# Patient Record
Sex: FEMALE | Race: WHITE | NOT HISPANIC OR LATINO | Employment: OTHER | URBAN - METROPOLITAN AREA
[De-identification: names, ages, dates, MRNs, and addresses within clinical notes are randomized per-mention and may not be internally consistent; named-entity substitution may affect disease eponyms.]

---

## 2023-11-22 DIAGNOSIS — G40.909 SEIZURE DISORDER (MULTI): Primary | ICD-10-CM

## 2023-11-22 RX ORDER — ZONISAMIDE 100 MG/1
300 CAPSULE ORAL DAILY
Qty: 90 CAPSULE | Refills: 0 | Status: SHIPPED | OUTPATIENT
Start: 2023-11-22 | End: 2024-03-21 | Stop reason: SDUPTHER

## 2023-12-27 ENCOUNTER — HOSPITAL ENCOUNTER (EMERGENCY)
Facility: HOSPITAL | Age: 73
Discharge: HOME | End: 2023-12-28
Attending: EMERGENCY MEDICINE
Payer: MEDICARE
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
## 2023-12-27 DIAGNOSIS — S09.90XA HEAD INJURY, INITIAL ENCOUNTER: ICD-10-CM

## 2023-12-27 PROCEDURE — 99285 EMERGENCY DEPT VISIT HI MDM: CPT | Performed by: EMERGENCY MEDICINE

## 2023-12-28 ENCOUNTER — APPOINTMENT (OUTPATIENT)
Dept: RADIOLOGY | Facility: HOSPITAL | Age: 73
End: 2023-12-28
Payer: MEDICARE

## 2023-12-28 VITALS
DIASTOLIC BLOOD PRESSURE: 80 MMHG | TEMPERATURE: 97 F | OXYGEN SATURATION: 99 % | RESPIRATION RATE: 20 BRPM | SYSTOLIC BLOOD PRESSURE: 105 MMHG | HEART RATE: 80 BPM

## 2023-12-28 PROCEDURE — 90714 TD VACC NO PRESV 7 YRS+ IM: CPT | Performed by: EMERGENCY MEDICINE

## 2023-12-28 PROCEDURE — 2500000004 HC RX 250 GENERAL PHARMACY W/ HCPCS (ALT 636 FOR OP/ED): Performed by: EMERGENCY MEDICINE

## 2023-12-28 PROCEDURE — 70450 CT HEAD/BRAIN W/O DYE: CPT | Performed by: RADIOLOGY

## 2023-12-28 PROCEDURE — 72125 CT NECK SPINE W/O DYE: CPT

## 2023-12-28 PROCEDURE — 90471 IMMUNIZATION ADMIN: CPT | Performed by: EMERGENCY MEDICINE

## 2023-12-28 PROCEDURE — 70450 CT HEAD/BRAIN W/O DYE: CPT

## 2023-12-28 PROCEDURE — 72125 CT NECK SPINE W/O DYE: CPT | Performed by: RADIOLOGY

## 2023-12-28 RX ADMIN — CLOSTRIDIUM TETANI TOXOID ANTIGEN (FORMALDEHYDE INACTIVATED) AND CORYNEBACTERIUM DIPHTHERIAE TOXOID ANTIGEN (FORMALDEHYDE INACTIVATED) 0.5 ML: 5; 2 INJECTION, SUSPENSION INTRAMUSCULAR at 02:24

## 2023-12-28 ASSESSMENT — COLUMBIA-SUICIDE SEVERITY RATING SCALE - C-SSRS
1. IN THE PAST MONTH, HAVE YOU WISHED YOU WERE DEAD OR WISHED YOU COULD GO TO SLEEP AND NOT WAKE UP?: NO
2. HAVE YOU ACTUALLY HAD ANY THOUGHTS OF KILLING YOURSELF?: NO
6. HAVE YOU EVER DONE ANYTHING, STARTED TO DO ANYTHING, OR PREPARED TO DO ANYTHING TO END YOUR LIFE?: NO

## 2023-12-28 ASSESSMENT — PAIN SCALES - GENERAL: PAINLEVEL_OUTOF10: 0 - NO PAIN

## 2023-12-28 ASSESSMENT — PAIN - FUNCTIONAL ASSESSMENT: PAIN_FUNCTIONAL_ASSESSMENT: 0-10

## 2023-12-28 NOTE — ED PROVIDER NOTES
HPI   Chief Complaint   Patient presents with    Fall     Witnessed fall by . Hx of brain lesion and alcoholism. Pt a+o to self at this time        This is a 73 years old female patient with history of dementia and EtOH use presented to the emergency department after she fell.   stated that she drank a lot tonight, she is intoxicated, she lost her balance, fell, hit her head.  Patient has a very small injury to the dorsal aspect of the right foot at the level of the 4th-5th metatarsophalangeal joint.  Could not recall her last tetanus shot.  Denies any headache, lightheadedness, dizziness, chest pain, shortness of breath, cough, abdominal pain, weakness or numbness.  Able to move all extremities.      History of present illness as well as review of system are limited secondary to the patient's dementia and intoxication.                          Melissa Coma Scale Score: 14                  Patient History   No past medical history on file.  Past Surgical History:   Procedure Laterality Date    CT ANGIO NECK W AND WO IV CONTRAST  4/24/2022    CT NECK ANGIO W AND WO IV CONTRAST 4/24/2022 [facility]    CT HEAD ANGIO W AND WO IV CONTRAST  4/24/2022    CT HEAD ANGIO W AND WO IV CONTRAST 4/24/2022 [facility]     No family history on file.  Social History     Tobacco Use    Smoking status: Not on file    Smokeless tobacco: Not on file   Substance Use Topics    Alcohol use: Not on file    Drug use: Not on file       Physical Exam   ED Triage Vitals [12/28/23 0004]   Temp Heart Rate Resp BP   -- 88 20 (!) 172/92      SpO2 Temp src Heart Rate Source Patient Position   99 % -- -- --      BP Location FiO2 (%)     Right arm --       Physical Exam  Vitals and nursing note reviewed.   Constitutional:       General: She is not in acute distress.     Appearance: She is well-developed.   HENT:      Head: Normocephalic.      Comments: Hematoma to the left parieto-occipital region  Eyes:       Conjunctiva/sclera: Conjunctivae normal.   Cardiovascular:      Rate and Rhythm: Normal rate and regular rhythm.      Heart sounds: No murmur heard.  Pulmonary:      Effort: Pulmonary effort is normal. No respiratory distress.      Breath sounds: Normal breath sounds.   Abdominal:      Palpations: Abdomen is soft.      Tenderness: There is no abdominal tenderness.   Musculoskeletal:         General: No swelling.      Cervical back: Neck supple.   Skin:     General: Skin is warm and dry.      Capillary Refill: Capillary refill takes less than 2 seconds.   Neurological:      General: No focal deficit present.      Mental Status: She is alert. Mental status is at baseline.   Psychiatric:         Mood and Affect: Mood normal.         ED Course & MDM   Diagnoses as of 12/28/23 0200   Fall, initial encounter   Head injury, initial encounter       Medical Decision Making  Patient is seen and examined, will obtain CT head, cervical spine given the mechanical nature of the fall.   is okay to get the patient a ride home.    Scans are unremarkable,  is very comfortable driving with the patient back home.Patient is given instruction to return to the ED if alarming symptoms arise as per discharge instruction.    Amount and/or Complexity of Data Reviewed  Radiology: ordered. Decision-making details documented in ED Course.        Procedure  Procedures     Nitin Shepherd,   12/28/23 0200

## 2023-12-28 NOTE — DISCHARGE INSTRUCTIONS
Please follow-up with a primary care provider in 2 to 3 days.  Avoid binge drinking.  Return immediately to the emergency department if you develop any headache, change in vision, weakness, tingling, or numbness to the hands or feet.

## 2024-02-12 ENCOUNTER — HOSPITAL ENCOUNTER (OUTPATIENT)
Dept: RADIOLOGY | Facility: CLINIC | Age: 74
Discharge: HOME | End: 2024-02-12
Payer: MEDICARE

## 2024-02-12 DIAGNOSIS — G93.9 DISORDER OF BRAIN, UNSPECIFIED: ICD-10-CM

## 2024-02-12 DIAGNOSIS — G40.909 EPILEPSY, UNSPECIFIED, NOT INTRACTABLE, WITHOUT STATUS EPILEPTICUS (MULTI): ICD-10-CM

## 2024-02-12 PROCEDURE — 2500000004 HC RX 250 GENERAL PHARMACY W/ HCPCS (ALT 636 FOR OP/ED): Performed by: STUDENT IN AN ORGANIZED HEALTH CARE EDUCATION/TRAINING PROGRAM

## 2024-02-12 PROCEDURE — A9575 INJ GADOTERATE MEGLUMI 0.1ML: HCPCS | Performed by: STUDENT IN AN ORGANIZED HEALTH CARE EDUCATION/TRAINING PROGRAM

## 2024-02-12 PROCEDURE — 70553 MRI BRAIN STEM W/O & W/DYE: CPT | Performed by: RADIOLOGY

## 2024-02-12 PROCEDURE — 70553 MRI BRAIN STEM W/O & W/DYE: CPT

## 2024-02-12 RX ORDER — GADOTERATE MEGLUMINE 376.9 MG/ML
11 INJECTION INTRAVENOUS
Status: COMPLETED | OUTPATIENT
Start: 2024-02-12 | End: 2024-02-12

## 2024-02-12 RX ADMIN — GADOTERATE MEGLUMINE 11 ML: 376.9 INJECTION INTRAVENOUS at 10:27

## 2024-02-14 NOTE — RESULT ENCOUNTER NOTE
Katya's MRI was stable. There was no change to the right temporal lesion which looks like it is now a scar and not anything more serious. At this point, I do not recommend any further imaging but I will continue to follow her for the history of seizures. She has an appointment in march so I can discuss further then. Can you let her know?

## 2024-03-21 ENCOUNTER — OFFICE VISIT (OUTPATIENT)
Dept: NEUROLOGY | Facility: CLINIC | Age: 74
End: 2024-03-21
Payer: MEDICARE

## 2024-03-21 VITALS
DIASTOLIC BLOOD PRESSURE: 74 MMHG | HEIGHT: 65 IN | TEMPERATURE: 97.2 F | WEIGHT: 121 LBS | BODY MASS INDEX: 20.16 KG/M2 | RESPIRATION RATE: 18 BRPM | SYSTOLIC BLOOD PRESSURE: 142 MMHG | HEART RATE: 79 BPM

## 2024-03-21 DIAGNOSIS — G93.9 BRAIN LESION: Primary | ICD-10-CM

## 2024-03-21 DIAGNOSIS — G40.909 SEIZURE DISORDER (MULTI): ICD-10-CM

## 2024-03-21 PROBLEM — M19.91 LOCALIZED, PRIMARY OSTEOARTHRITIS: Status: ACTIVE | Noted: 2021-07-01

## 2024-03-21 PROBLEM — F32.A DEPRESSION: Status: ACTIVE | Noted: 2024-03-21

## 2024-03-21 PROBLEM — G43.909 MIGRAINE HEADACHE: Status: ACTIVE | Noted: 2024-03-21

## 2024-03-21 PROBLEM — F10.10 ALCOHOL ABUSE: Status: ACTIVE | Noted: 2024-03-21

## 2024-03-21 PROBLEM — E03.9 HYPOTHYROIDISM: Status: ACTIVE | Noted: 2024-03-21

## 2024-03-21 PROBLEM — I21.19: Status: ACTIVE | Noted: 2024-03-21

## 2024-03-21 PROBLEM — I10 HTN (HYPERTENSION): Status: ACTIVE | Noted: 2024-03-21

## 2024-03-21 PROBLEM — R47.01 APHASIA: Status: ACTIVE | Noted: 2022-09-06

## 2024-03-21 PROCEDURE — 3078F DIAST BP <80 MM HG: CPT | Performed by: STUDENT IN AN ORGANIZED HEALTH CARE EDUCATION/TRAINING PROGRAM

## 2024-03-21 PROCEDURE — 1160F RVW MEDS BY RX/DR IN RCRD: CPT | Performed by: STUDENT IN AN ORGANIZED HEALTH CARE EDUCATION/TRAINING PROGRAM

## 2024-03-21 PROCEDURE — 99214 OFFICE O/P EST MOD 30 MIN: CPT | Performed by: STUDENT IN AN ORGANIZED HEALTH CARE EDUCATION/TRAINING PROGRAM

## 2024-03-21 PROCEDURE — 3077F SYST BP >= 140 MM HG: CPT | Performed by: STUDENT IN AN ORGANIZED HEALTH CARE EDUCATION/TRAINING PROGRAM

## 2024-03-21 PROCEDURE — 1159F MED LIST DOCD IN RCRD: CPT | Performed by: STUDENT IN AN ORGANIZED HEALTH CARE EDUCATION/TRAINING PROGRAM

## 2024-03-21 PROCEDURE — 1126F AMNT PAIN NOTED NONE PRSNT: CPT | Performed by: STUDENT IN AN ORGANIZED HEALTH CARE EDUCATION/TRAINING PROGRAM

## 2024-03-21 RX ORDER — SUMATRIPTAN SUCCINATE 25 MG/1
25 TABLET ORAL ONCE AS NEEDED
COMMUNITY
End: 2024-05-01 | Stop reason: HOSPADM

## 2024-03-21 RX ORDER — ZONISAMIDE 100 MG/1
200 CAPSULE ORAL DAILY
Qty: 180 CAPSULE | Refills: 3 | Status: SHIPPED | OUTPATIENT
Start: 2024-03-21 | End: 2025-03-21

## 2024-03-21 RX ORDER — MELOXICAM 7.5 MG/1
7.5 TABLET ORAL DAILY PRN
COMMUNITY
End: 2024-05-01 | Stop reason: HOSPADM

## 2024-03-21 RX ORDER — VENLAFAXINE HYDROCHLORIDE 75 MG/1
75 CAPSULE, EXTENDED RELEASE ORAL DAILY
COMMUNITY
End: 2024-05-01 | Stop reason: HOSPADM

## 2024-03-21 RX ORDER — NAPROXEN SODIUM 220 MG/1
81 TABLET, FILM COATED ORAL DAILY
COMMUNITY
Start: 2022-05-11 | End: 2024-04-23 | Stop reason: ENTERED-IN-ERROR

## 2024-03-21 RX ORDER — SPIRONOLACTONE 50 MG/1
50 TABLET, FILM COATED ORAL DAILY
COMMUNITY
End: 2024-05-01 | Stop reason: HOSPADM

## 2024-03-21 RX ORDER — LEVOTHYROXINE SODIUM 50 UG/1
50 TABLET ORAL DAILY
COMMUNITY

## 2024-03-21 ASSESSMENT — ENCOUNTER SYMPTOMS
LOSS OF SENSATION IN FEET: 0
DEPRESSION: 0
OCCASIONAL FEELINGS OF UNSTEADINESS: 0

## 2024-03-21 ASSESSMENT — PAIN SCALES - GENERAL: PAINLEVEL: 0-NO PAIN

## 2024-03-21 NOTE — PROGRESS NOTES
Subjective     Katya Sandhu is a 73 y.o. 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    She was last seen 9/13/2023. Since then, she has had no further seizures. She is on zonisamide 300mg daily. Her headaches have resolved, off of venlafaxine. She continues to drink alcohol daily which per her  is a considerable amount. She has episodes of memory loss, slurred speech and  feels she might be over medicated. She is not driving.     Her  has noticed some cognitive impairment most obvious in her speech. She will start to speak but loses train of thought in the middle of the sentence. She has trouble with nouns.     Repeat MRI brain done 2/12/2024 showed stable left hippocampus and left amygdala non enhancing [id number] lesions consistent with mesial temporal sclerosis.     Patient Active Problem List   Diagnosis    Alcohol abuse    Aphasia    Depression    HTN (hypertension)    Hypothyroidism    Localized, primary osteoarthritis    Migraine headache    Myocardial infarction, inferolateral wall (CMS/HCC)    Brain lesion    Seizure disorder (CMS/HCC)       Objective   Neurological Exam  Mental Status  Awake, alert and oriented to person, place and time. Speech is normal.  Irritable at times  Midl cognitive impairment d.    Cranial Nerves  CN II: Visual fields full to confrontation.  CN III, IV, VI: Extraocular movements intact bilaterally.  CN V: Facial sensation is normal.  CN VII: Full and symmetric facial movement.  CN VIII: Hearing is normal.  CN IX, X: Palate elevates symmetrically  CN XI: Shoulder shrug strength is normal.  CN XII: Tongue midline without atrophy or fasciculations.    Motor   Strength is 5/5 throughout all four extremities.    Sensory  Light touch is normal in upper and lower extremities.     Coordination  Right: Finger-to-nose normal.Left: Finger-to-nose normal.    Physical Exam  Eyes:      Extraocular Movements: Extraocular  movements intact.   Neurological:      Motor: Motor strength is normal.  Psychiatric:         Speech: Speech normal.       Assessment/Plan   Diagnoses and all orders for this visit:  Brain lesion  Seizure disorder (CMS/HCC)    1. 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 Off Keppra due to behavioral side effects. Will reduce zonisamide 200mg at bedtime due to concern for side effects. Discussed that I would recommend against tapering off completely as she has a persistent scar and temporal lobe atrophy making her at risk for seizures in the future.      Brain lesion of unclear etiology. autoimmune encephalopathy panel negative. serial MRIs appear to be most consistent with gliosis from encephalitis and less likely a neoplastic lesion. Will only imaging for clinical symptoms going forward      2. Alcohol use disorder: not ok to drive while continuing to abuse alcohol in binges. recommend cessation of alcohol, again discussed referral to substance use disorder treatment, patient continued declined at this time     3. Migraine headaches: resolved. continue prn sumatriptan      Follow-up in 1 year

## 2024-04-23 ENCOUNTER — APPOINTMENT (OUTPATIENT)
Dept: RADIOLOGY | Facility: HOSPITAL | Age: 74
DRG: 028 | End: 2024-04-23
Payer: MEDICARE

## 2024-04-23 ENCOUNTER — HOSPITAL ENCOUNTER (INPATIENT)
Facility: HOSPITAL | Age: 74
LOS: 8 days | Discharge: NURSING, REHAB OR LONG-TERM CARE | DRG: 028 | End: 2024-05-01
Attending: STUDENT IN AN ORGANIZED HEALTH CARE EDUCATION/TRAINING PROGRAM | Admitting: INTERNAL MEDICINE
Payer: MEDICARE

## 2024-04-23 ENCOUNTER — APPOINTMENT (OUTPATIENT)
Dept: CARDIOLOGY | Facility: HOSPITAL | Age: 74
DRG: 028 | End: 2024-04-23
Payer: MEDICARE

## 2024-04-23 DIAGNOSIS — M54.2 NECK PAIN: ICD-10-CM

## 2024-04-23 DIAGNOSIS — F10.10 ETOH ABUSE: ICD-10-CM

## 2024-04-23 DIAGNOSIS — R13.12 OROPHARYNGEAL DYSPHAGIA: ICD-10-CM

## 2024-04-23 DIAGNOSIS — M62.82 RHABDOMYOLYSIS: Primary | ICD-10-CM

## 2024-04-23 DIAGNOSIS — G89.18 ACUTE POSTOPERATIVE PAIN: ICD-10-CM

## 2024-04-23 DIAGNOSIS — S09.90XA CLOSED HEAD INJURY, INITIAL ENCOUNTER: ICD-10-CM

## 2024-04-23 DIAGNOSIS — S12.501A CLOSED NONDISPLACED FRACTURE OF SIXTH CERVICAL VERTEBRA, UNSPECIFIED FRACTURE MORPHOLOGY, INITIAL ENCOUNTER (MULTI): ICD-10-CM

## 2024-04-23 PROBLEM — F10.939: Status: ACTIVE | Noted: 2024-04-23

## 2024-04-23 PROBLEM — R56.9: Status: ACTIVE | Noted: 2024-04-23

## 2024-04-23 PROBLEM — R79.89 ELEVATED LACTIC ACID LEVEL: Status: ACTIVE | Noted: 2024-04-23
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
## 2024-04-23 LAB
ALBUMIN SERPL BCP-MCNC: 4.7 G/DL (ref 3.4–5)
ALP SERPL-CCNC: 70 U/L (ref 33–136)
ALT SERPL W P-5'-P-CCNC: 33 U/L (ref 7–45)
AMMONIA PLAS-SCNC: 28 UMOL/L (ref 16–53)
ANION GAP SERPL CALC-SCNC: 18 MMOL/L (ref 10–20)
APAP SERPL-MCNC: 17.2 UG/ML
APPEARANCE UR: CLEAR
AST SERPL W P-5'-P-CCNC: 46 U/L (ref 9–39)
BASOPHILS # BLD AUTO: 0.05 X10*3/UL (ref 0–0.1)
BASOPHILS NFR BLD AUTO: 0.4 %
BILIRUB SERPL-MCNC: 0.8 MG/DL (ref 0–1.2)
BILIRUB UR STRIP.AUTO-MCNC: NEGATIVE MG/DL
BUN SERPL-MCNC: 21 MG/DL (ref 6–23)
CALCIUM SERPL-MCNC: 9 MG/DL (ref 8.6–10.3)
CARDIAC TROPONIN I PNL SERPL HS: 9 NG/L (ref 0–13)
CHLORIDE SERPL-SCNC: 106 MMOL/L (ref 98–107)
CK SERPL-CCNC: 841 U/L (ref 0–215)
CK SERPL-CCNC: 865 U/L (ref 0–215)
CK SERPL-CCNC: 893 U/L (ref 0–215)
CO2 SERPL-SCNC: 21 MMOL/L (ref 21–32)
COLOR UR: YELLOW
CREAT SERPL-MCNC: 0.57 MG/DL (ref 0.5–1.05)
EGFRCR SERPLBLD CKD-EPI 2021: >90 ML/MIN/1.73M*2
EOSINOPHIL # BLD AUTO: 0 X10*3/UL (ref 0–0.4)
EOSINOPHIL NFR BLD AUTO: 0 %
ERYTHROCYTE [DISTWIDTH] IN BLOOD BY AUTOMATED COUNT: 13.6 % (ref 11.5–14.5)
ETHANOL SERPL-MCNC: 89 MG/DL
GLUCOSE SERPL-MCNC: 128 MG/DL (ref 74–99)
GLUCOSE UR STRIP.AUTO-MCNC: NEGATIVE MG/DL
HCT VFR BLD AUTO: 39.3 % (ref 36–46)
HGB BLD-MCNC: 12.9 G/DL (ref 12–16)
HOLD SPECIMEN: NORMAL
HOLD SPECIMEN: NORMAL
IMM GRANULOCYTES # BLD AUTO: 0.03 X10*3/UL (ref 0–0.5)
IMM GRANULOCYTES NFR BLD AUTO: 0.2 % (ref 0–0.9)
INR PPP: 1 (ref 0.9–1.1)
KETONES UR STRIP.AUTO-MCNC: ABNORMAL MG/DL
LACTATE SERPL-SCNC: 1.9 MMOL/L (ref 0.4–2)
LACTATE SERPL-SCNC: 2.4 MMOL/L (ref 0.4–2)
LACTATE SERPL-SCNC: 2.8 MMOL/L (ref 0.4–2)
LEUKOCYTE ESTERASE UR QL STRIP.AUTO: ABNORMAL
LYMPHOCYTES # BLD AUTO: 0.89 X10*3/UL (ref 0.8–3)
LYMPHOCYTES NFR BLD AUTO: 7.3 %
MCH RBC QN AUTO: 32.7 PG (ref 26–34)
MCHC RBC AUTO-ENTMCNC: 32.8 G/DL (ref 32–36)
MCV RBC AUTO: 100 FL (ref 80–100)
MONOCYTES # BLD AUTO: 1.12 X10*3/UL (ref 0.05–0.8)
MONOCYTES NFR BLD AUTO: 9.1 %
NEUTROPHILS # BLD AUTO: 10.18 X10*3/UL (ref 1.6–5.5)
NEUTROPHILS NFR BLD AUTO: 83 %
NITRITE UR QL STRIP.AUTO: NEGATIVE
NRBC BLD-RTO: 0 /100 WBCS (ref 0–0)
PH UR STRIP.AUTO: 6 [PH]
PLATELET # BLD AUTO: 272 X10*3/UL (ref 150–450)
POTASSIUM SERPL-SCNC: 4.1 MMOL/L (ref 3.5–5.3)
PROT SERPL-MCNC: 6.4 G/DL (ref 6.4–8.2)
PROT UR STRIP.AUTO-MCNC: NEGATIVE MG/DL
PROTHROMBIN TIME: 11.3 SECONDS (ref 9.8–12.8)
RBC # BLD AUTO: 3.94 X10*6/UL (ref 4–5.2)
RBC # UR STRIP.AUTO: NEGATIVE /UL
RBC #/AREA URNS AUTO: ABNORMAL /HPF
SALICYLATES SERPL-MCNC: <3 MG/DL
SODIUM SERPL-SCNC: 141 MMOL/L (ref 136–145)
SP GR UR STRIP.AUTO: >1.03
UROBILINOGEN UR STRIP.AUTO-MCNC: ABNORMAL MG/DL
WBC # BLD AUTO: 12.3 X10*3/UL (ref 4.4–11.3)
WBC #/AREA URNS AUTO: ABNORMAL /HPF

## 2024-04-23 PROCEDURE — 73080 X-RAY EXAM OF ELBOW: CPT | Mod: RIGHT SIDE | Performed by: RADIOLOGY

## 2024-04-23 PROCEDURE — 73080 X-RAY EXAM OF ELBOW: CPT | Mod: RT

## 2024-04-23 PROCEDURE — 72125 CT NECK SPINE W/O DYE: CPT

## 2024-04-23 PROCEDURE — 36415 COLL VENOUS BLD VENIPUNCTURE: CPT | Performed by: STUDENT IN AN ORGANIZED HEALTH CARE EDUCATION/TRAINING PROGRAM

## 2024-04-23 PROCEDURE — 2500000004 HC RX 250 GENERAL PHARMACY W/ HCPCS (ALT 636 FOR OP/ED)

## 2024-04-23 PROCEDURE — 72128 CT CHEST SPINE W/O DYE: CPT | Mod: RCN

## 2024-04-23 PROCEDURE — 83605 ASSAY OF LACTIC ACID: CPT | Performed by: STUDENT IN AN ORGANIZED HEALTH CARE EDUCATION/TRAINING PROGRAM

## 2024-04-23 PROCEDURE — 96360 HYDRATION IV INFUSION INIT: CPT | Mod: 59

## 2024-04-23 PROCEDURE — 74177 CT ABD & PELVIS W/CONTRAST: CPT

## 2024-04-23 PROCEDURE — 72141 MRI NECK SPINE W/O DYE: CPT

## 2024-04-23 PROCEDURE — 81001 URINALYSIS AUTO W/SCOPE: CPT | Performed by: STUDENT IN AN ORGANIZED HEALTH CARE EDUCATION/TRAINING PROGRAM

## 2024-04-23 PROCEDURE — 84484 ASSAY OF TROPONIN QUANT: CPT | Performed by: NURSE PRACTITIONER

## 2024-04-23 PROCEDURE — 80143 DRUG ASSAY ACETAMINOPHEN: CPT | Performed by: STUDENT IN AN ORGANIZED HEALTH CARE EDUCATION/TRAINING PROGRAM

## 2024-04-23 PROCEDURE — 82550 ASSAY OF CK (CPK): CPT | Performed by: STUDENT IN AN ORGANIZED HEALTH CARE EDUCATION/TRAINING PROGRAM

## 2024-04-23 PROCEDURE — 2500000005 HC RX 250 GENERAL PHARMACY W/O HCPCS

## 2024-04-23 PROCEDURE — 2500000004 HC RX 250 GENERAL PHARMACY W/ HCPCS (ALT 636 FOR OP/ED): Performed by: STUDENT IN AN ORGANIZED HEALTH CARE EDUCATION/TRAINING PROGRAM

## 2024-04-23 PROCEDURE — 2500000005 HC RX 250 GENERAL PHARMACY W/O HCPCS: Performed by: NURSE PRACTITIONER

## 2024-04-23 PROCEDURE — 87086 URINE CULTURE/COLONY COUNT: CPT | Mod: STJLAB | Performed by: STUDENT IN AN ORGANIZED HEALTH CARE EDUCATION/TRAINING PROGRAM

## 2024-04-23 PROCEDURE — 80053 COMPREHEN METABOLIC PANEL: CPT | Performed by: STUDENT IN AN ORGANIZED HEALTH CARE EDUCATION/TRAINING PROGRAM

## 2024-04-23 PROCEDURE — 99285 EMERGENCY DEPT VISIT HI MDM: CPT | Mod: 25

## 2024-04-23 PROCEDURE — 1200000002 HC GENERAL ROOM WITH TELEMETRY DAILY

## 2024-04-23 PROCEDURE — 2500000004 HC RX 250 GENERAL PHARMACY W/ HCPCS (ALT 636 FOR OP/ED): Performed by: NURSE PRACTITIONER

## 2024-04-23 PROCEDURE — 2550000001 HC RX 255 CONTRASTS: Performed by: STUDENT IN AN ORGANIZED HEALTH CARE EDUCATION/TRAINING PROGRAM

## 2024-04-23 PROCEDURE — 85025 COMPLETE CBC W/AUTO DIFF WBC: CPT | Performed by: STUDENT IN AN ORGANIZED HEALTH CARE EDUCATION/TRAINING PROGRAM

## 2024-04-23 PROCEDURE — 96374 THER/PROPH/DIAG INJ IV PUSH: CPT | Mod: 59

## 2024-04-23 PROCEDURE — 2500000006 HC RX 250 W HCPCS SELF ADMINISTERED DRUGS (ALT 637 FOR ALL PAYERS)

## 2024-04-23 PROCEDURE — 2500000001 HC RX 250 WO HCPCS SELF ADMINISTERED DRUGS (ALT 637 FOR MEDICARE OP)

## 2024-04-23 PROCEDURE — 82140 ASSAY OF AMMONIA: CPT | Performed by: NURSE PRACTITIONER

## 2024-04-23 PROCEDURE — 85610 PROTHROMBIN TIME: CPT | Performed by: STUDENT IN AN ORGANIZED HEALTH CARE EDUCATION/TRAINING PROGRAM

## 2024-04-23 PROCEDURE — 51702 INSERT TEMP BLADDER CATH: CPT

## 2024-04-23 PROCEDURE — 82550 ASSAY OF CK (CPK): CPT | Mod: 91

## 2024-04-23 PROCEDURE — 70450 CT HEAD/BRAIN W/O DYE: CPT

## 2024-04-23 PROCEDURE — 72131 CT LUMBAR SPINE W/O DYE: CPT

## 2024-04-23 PROCEDURE — 99223 1ST HOSP IP/OBS HIGH 75: CPT

## 2024-04-23 PROCEDURE — 72141 MRI NECK SPINE W/O DYE: CPT | Performed by: RADIOLOGY

## 2024-04-23 PROCEDURE — 93005 ELECTROCARDIOGRAM TRACING: CPT

## 2024-04-23 RX ORDER — MORPHINE SULFATE 2 MG/ML
2 INJECTION, SOLUTION INTRAMUSCULAR; INTRAVENOUS ONCE
Status: COMPLETED | OUTPATIENT
Start: 2024-04-23 | End: 2024-04-23

## 2024-04-23 RX ORDER — THIAMINE HYDROCHLORIDE 100 MG/ML
100 INJECTION, SOLUTION INTRAMUSCULAR; INTRAVENOUS DAILY
Status: DISCONTINUED | OUTPATIENT
Start: 2024-04-23 | End: 2024-04-24

## 2024-04-23 RX ORDER — MULTIVIT-MIN/IRON FUM/FOLIC AC 7.5 MG-4
1 TABLET ORAL DAILY
Status: DISCONTINUED | OUTPATIENT
Start: 2024-04-23 | End: 2024-04-24

## 2024-04-23 RX ORDER — LANOLIN ALCOHOL/MO/W.PET/CERES
100 CREAM (GRAM) TOPICAL DAILY
Status: DISCONTINUED | OUTPATIENT
Start: 2024-04-26 | End: 2024-04-24

## 2024-04-23 RX ORDER — DIAZEPAM 5 MG/ML
5 INJECTION, SOLUTION INTRAMUSCULAR; INTRAVENOUS EVERY 6 HOURS
Status: DISCONTINUED | OUTPATIENT
Start: 2024-04-23 | End: 2024-04-24

## 2024-04-23 RX ORDER — ZONISAMIDE 100 MG/1
200 CAPSULE ORAL DAILY
Status: DISCONTINUED | OUTPATIENT
Start: 2024-04-23 | End: 2024-05-01 | Stop reason: HOSPADM

## 2024-04-23 RX ORDER — LORAZEPAM 2 MG/ML
0.5 INJECTION INTRAMUSCULAR EVERY 2 HOUR PRN
Status: DISCONTINUED | OUTPATIENT
Start: 2024-04-23 | End: 2024-04-24

## 2024-04-23 RX ORDER — OXYCODONE HYDROCHLORIDE 5 MG/1
5 TABLET ORAL EVERY 6 HOURS PRN
Status: DISCONTINUED | OUTPATIENT
Start: 2024-04-23 | End: 2024-04-24

## 2024-04-23 RX ORDER — SPIRONOLACTONE 50 MG/1
50 TABLET, FILM COATED ORAL DAILY
Status: DISCONTINUED | OUTPATIENT
Start: 2024-04-23 | End: 2024-04-24

## 2024-04-23 RX ORDER — ACETAMINOPHEN 650 MG/1
650 SUPPOSITORY RECTAL EVERY 4 HOURS PRN
Status: DISCONTINUED | OUTPATIENT
Start: 2024-04-23 | End: 2024-04-23

## 2024-04-23 RX ORDER — LEVOTHYROXINE SODIUM 50 UG/1
50 TABLET ORAL DAILY
Status: DISCONTINUED | OUTPATIENT
Start: 2024-04-23 | End: 2024-04-24

## 2024-04-23 RX ORDER — LORAZEPAM 2 MG/ML
1 INJECTION INTRAMUSCULAR EVERY 2 HOUR PRN
Status: DISCONTINUED | OUTPATIENT
Start: 2024-04-23 | End: 2024-04-24

## 2024-04-23 RX ORDER — ACETAMINOPHEN 650 MG/1
650 SUPPOSITORY RECTAL EVERY 6 HOURS PRN
Status: DISCONTINUED | OUTPATIENT
Start: 2024-04-23 | End: 2024-04-24

## 2024-04-23 RX ORDER — SODIUM CHLORIDE, SODIUM LACTATE, POTASSIUM CHLORIDE, CALCIUM CHLORIDE 600; 310; 30; 20 MG/100ML; MG/100ML; MG/100ML; MG/100ML
100 INJECTION, SOLUTION INTRAVENOUS CONTINUOUS
Status: DISCONTINUED | OUTPATIENT
Start: 2024-04-23 | End: 2024-04-23

## 2024-04-23 RX ORDER — ACETAMINOPHEN 325 MG/1
650 TABLET ORAL EVERY 4 HOURS PRN
Status: DISCONTINUED | OUTPATIENT
Start: 2024-04-23 | End: 2024-04-23

## 2024-04-23 RX ORDER — ACETAMINOPHEN 160 MG/5ML
650 SOLUTION ORAL EVERY 4 HOURS PRN
Status: DISCONTINUED | OUTPATIENT
Start: 2024-04-23 | End: 2024-04-23

## 2024-04-23 RX ORDER — FOLIC ACID 1 MG/1
1 TABLET ORAL DAILY
Status: DISCONTINUED | OUTPATIENT
Start: 2024-04-23 | End: 2024-04-24

## 2024-04-23 RX ORDER — SUMATRIPTAN SUCCINATE 25 MG/1
25 TABLET ORAL ONCE AS NEEDED
Status: DISCONTINUED | OUTPATIENT
Start: 2024-04-23 | End: 2024-04-24

## 2024-04-23 RX ORDER — POLYETHYLENE GLYCOL 3350 17 G/17G
17 POWDER, FOR SOLUTION ORAL DAILY
Status: DISCONTINUED | OUTPATIENT
Start: 2024-04-23 | End: 2024-04-24

## 2024-04-23 RX ORDER — LIDOCAINE 560 MG/1
1 PATCH PERCUTANEOUS; TOPICAL; TRANSDERMAL DAILY
Status: DISCONTINUED | OUTPATIENT
Start: 2024-04-23 | End: 2024-04-24

## 2024-04-23 RX ORDER — SODIUM CHLORIDE, SODIUM LACTATE, POTASSIUM CHLORIDE, CALCIUM CHLORIDE 600; 310; 30; 20 MG/100ML; MG/100ML; MG/100ML; MG/100ML
100 INJECTION, SOLUTION INTRAVENOUS CONTINUOUS
Status: DISCONTINUED | OUTPATIENT
Start: 2024-04-23 | End: 2024-04-24

## 2024-04-23 RX ORDER — KETOROLAC TROMETHAMINE 15 MG/ML
15 INJECTION, SOLUTION INTRAMUSCULAR; INTRAVENOUS ONCE
Status: COMPLETED | OUTPATIENT
Start: 2024-04-23 | End: 2024-04-23

## 2024-04-23 RX ORDER — VENLAFAXINE HYDROCHLORIDE 75 MG/1
75 CAPSULE, EXTENDED RELEASE ORAL DAILY
Status: DISCONTINUED | OUTPATIENT
Start: 2024-04-23 | End: 2024-04-24

## 2024-04-23 RX ORDER — MORPHINE SULFATE 2 MG/ML
2 INJECTION, SOLUTION INTRAMUSCULAR; INTRAVENOUS
Status: DISCONTINUED | OUTPATIENT
Start: 2024-04-23 | End: 2024-04-23

## 2024-04-23 RX ORDER — METOPROLOL TARTRATE 1 MG/ML
5 INJECTION, SOLUTION INTRAVENOUS EVERY 6 HOURS
Status: DISCONTINUED | OUTPATIENT
Start: 2024-04-23 | End: 2024-04-24

## 2024-04-23 RX ADMIN — HYDROMORPHONE HYDROCHLORIDE 0.5 MG: 1 INJECTION, SOLUTION INTRAMUSCULAR; INTRAVENOUS; SUBCUTANEOUS at 22:52

## 2024-04-23 RX ADMIN — SODIUM CHLORIDE, POTASSIUM CHLORIDE, SODIUM LACTATE AND CALCIUM CHLORIDE 100 ML/HR: 600; 310; 30; 20 INJECTION, SOLUTION INTRAVENOUS at 23:53

## 2024-04-23 RX ADMIN — MORPHINE SULFATE 2 MG: 2 INJECTION, SOLUTION INTRAMUSCULAR; INTRAVENOUS at 18:27

## 2024-04-23 RX ADMIN — METOPROLOL TARTRATE 5 MG: 5 INJECTION INTRAVENOUS at 22:52

## 2024-04-23 RX ADMIN — KETOROLAC TROMETHAMINE 15 MG: 15 INJECTION, SOLUTION INTRAMUSCULAR; INTRAVENOUS at 14:18

## 2024-04-23 RX ADMIN — VENLAFAXINE HYDROCHLORIDE 75 MG: 75 CAPSULE, EXTENDED RELEASE ORAL at 18:02

## 2024-04-23 RX ADMIN — SODIUM CHLORIDE 1000 ML: 9 INJECTION, SOLUTION INTRAVENOUS at 11:45

## 2024-04-23 RX ADMIN — THIAMINE HYDROCHLORIDE 100 MG: 100 INJECTION, SOLUTION INTRAMUSCULAR; INTRAVENOUS at 20:23

## 2024-04-23 RX ADMIN — DIAZEPAM 5 MG: 10 INJECTION, SOLUTION INTRAMUSCULAR; INTRAVENOUS at 23:49

## 2024-04-23 RX ADMIN — MORPHINE SULFATE 2 MG: 2 INJECTION, SOLUTION INTRAMUSCULAR; INTRAVENOUS at 17:36

## 2024-04-23 RX ADMIN — SODIUM CHLORIDE, POTASSIUM CHLORIDE, SODIUM LACTATE AND CALCIUM CHLORIDE 100 ML/HR: 600; 310; 30; 20 INJECTION, SOLUTION INTRAVENOUS at 17:35

## 2024-04-23 RX ADMIN — LORAZEPAM 0.5 MG: 2 INJECTION, SOLUTION INTRAMUSCULAR; INTRAVENOUS at 20:36

## 2024-04-23 RX ADMIN — METOPROLOL TARTRATE 5 MG: 5 INJECTION INTRAVENOUS at 18:02

## 2024-04-23 RX ADMIN — SPIRONOLACTONE 50 MG: 50 TABLET ORAL at 18:02

## 2024-04-23 RX ADMIN — LIDOCAINE 4% 1 PATCH: 40 PATCH TOPICAL at 20:23

## 2024-04-23 RX ADMIN — DIAZEPAM 5 MG: 10 INJECTION, SOLUTION INTRAMUSCULAR; INTRAVENOUS at 18:10

## 2024-04-23 RX ADMIN — ZONISAMIDE 200 MG: 100 CAPSULE ORAL at 20:23

## 2024-04-23 RX ADMIN — DEXAMETHASONE SODIUM PHOSPHATE 10 MG: 4 INJECTION, SOLUTION INTRAMUSCULAR; INTRAVENOUS at 22:24

## 2024-04-23 RX ADMIN — IOHEXOL 65 ML: 350 INJECTION, SOLUTION INTRAVENOUS at 10:22

## 2024-04-23 RX ADMIN — Medication 1 TABLET: at 18:02

## 2024-04-23 RX ADMIN — MORPHINE SULFATE 2 MG: 2 INJECTION, SOLUTION INTRAMUSCULAR; INTRAVENOUS at 21:19

## 2024-04-23 RX ADMIN — SODIUM CHLORIDE, POTASSIUM CHLORIDE, SODIUM LACTATE AND CALCIUM CHLORIDE 1000 ML: 600; 310; 30; 20 INJECTION, SOLUTION INTRAVENOUS at 08:35

## 2024-04-23 RX ADMIN — FOLIC ACID 1 MG: 1 TABLET ORAL at 18:02

## 2024-04-23 SDOH — SOCIAL STABILITY: SOCIAL INSECURITY: DO YOU FEEL UNSAFE GOING BACK TO THE PLACE WHERE YOU ARE LIVING?: NO

## 2024-04-23 SDOH — SOCIAL STABILITY: SOCIAL INSECURITY: ABUSE: ADULT

## 2024-04-23 SDOH — SOCIAL STABILITY: SOCIAL INSECURITY: HAVE YOU HAD ANY THOUGHTS OF HARMING ANYONE ELSE?: NO

## 2024-04-23 SDOH — SOCIAL STABILITY: SOCIAL INSECURITY: DO YOU FEEL ANYONE HAS EXPLOITED OR TAKEN ADVANTAGE OF YOU FINANCIALLY OR OF YOUR PERSONAL PROPERTY?: NO

## 2024-04-23 SDOH — SOCIAL STABILITY: SOCIAL INSECURITY: DOES ANYONE TRY TO KEEP YOU FROM HAVING/CONTACTING OTHER FRIENDS OR DOING THINGS OUTSIDE YOUR HOME?: NO

## 2024-04-23 SDOH — SOCIAL STABILITY: SOCIAL INSECURITY: ARE YOU OR HAVE YOU BEEN THREATENED OR ABUSED PHYSICALLY, EMOTIONALLY, OR SEXUALLY BY ANYONE?: NO

## 2024-04-23 SDOH — SOCIAL STABILITY: SOCIAL INSECURITY: HAVE YOU HAD THOUGHTS OF HARMING ANYONE ELSE?: NO

## 2024-04-23 SDOH — SOCIAL STABILITY: SOCIAL INSECURITY: ARE THERE ANY APPARENT SIGNS OF INJURIES/BEHAVIORS THAT COULD BE RELATED TO ABUSE/NEGLECT?: NO

## 2024-04-23 SDOH — SOCIAL STABILITY: SOCIAL INSECURITY: WERE YOU ABLE TO COMPLETE ALL THE BEHAVIORAL HEALTH SCREENINGS?: YES

## 2024-04-23 SDOH — SOCIAL STABILITY: SOCIAL INSECURITY: HAS ANYONE EVER THREATENED TO HURT YOUR FAMILY OR YOUR PETS?: NO

## 2024-04-23 ASSESSMENT — PATIENT HEALTH QUESTIONNAIRE - PHQ9
SUM OF ALL RESPONSES TO PHQ9 QUESTIONS 1 & 2: 0
1. LITTLE INTEREST OR PLEASURE IN DOING THINGS: NOT AT ALL
2. FEELING DOWN, DEPRESSED OR HOPELESS: NOT AT ALL

## 2024-04-23 ASSESSMENT — PAIN SCALES - GENERAL
PAINLEVEL_OUTOF10: 6
PAINLEVEL_OUTOF10: 10 - WORST POSSIBLE PAIN
PAINLEVEL_OUTOF10: 10 - WORST POSSIBLE PAIN
PAINLEVEL_OUTOF10: 9
PAINLEVEL_OUTOF10: 3
PAINLEVEL_OUTOF10: 4
PAINLEVEL_OUTOF10: 8
PAINLEVEL_OUTOF10: 8
PAINLEVEL_OUTOF10: 6
PAINLEVEL_OUTOF10: 9
PAINLEVEL_OUTOF10: 5 - MODERATE PAIN
PAINLEVEL_OUTOF10: 10 - WORST POSSIBLE PAIN

## 2024-04-23 ASSESSMENT — ACTIVITIES OF DAILY LIVING (ADL)
JUDGMENT_ADEQUATE_SAFELY_COMPLETE_DAILY_ACTIVITIES: YES
FEEDING YOURSELF: INDEPENDENT
HEARING - RIGHT EAR: FUNCTIONAL
DRESSING YOURSELF: INDEPENDENT
LACK_OF_TRANSPORTATION: NO
PATIENT'S MEMORY ADEQUATE TO SAFELY COMPLETE DAILY ACTIVITIES?: YES
WALKS IN HOME: INDEPENDENT
HEARING - LEFT EAR: FUNCTIONAL
ADEQUATE_TO_COMPLETE_ADL: YES
TOILETING: INDEPENDENT
GROOMING: INDEPENDENT
BATHING: INDEPENDENT

## 2024-04-23 ASSESSMENT — LIFESTYLE VARIABLES
HAVE YOU EVER FELT YOU SHOULD CUT DOWN ON YOUR DRINKING: NO
TOTAL SCORE: 2
PAROXYSMAL SWEATS: 2
AUDIT-C TOTAL SCORE: 5
NAUSEA AND VOMITING: NO NAUSEA AND NO VOMITING
PAROXYSMAL SWEATS: 2
VISUAL DISTURBANCES: NOT PRESENT
AUDITORY DISTURBANCES: NOT PRESENT
AGITATION: NORMAL ACTIVITY
PULSE: 70
EVER FELT BAD OR GUILTY ABOUT YOUR DRINKING: NO
AGITATION: NORMAL ACTIVITY
TREMOR: 3
PRESCIPTION_ABUSE_PAST_12_MONTHS: NO
TOTAL SCORE: 6
HOW MANY STANDARD DRINKS CONTAINING ALCOHOL DO YOU HAVE ON A TYPICAL DAY: 1 OR 2
HOW OFTEN DURING THE LAST YEAR HAVE YOU NEEDED AN ALCOHOLIC DRINK FIRST THING IN THE MORNING TO GET YOURSELF GOING AFTER A NIGHT OF HEAVY DRINKING: LESS THAN MONTHLY
AUDITORY DISTURBANCES: NOT PRESENT
HAVE PEOPLE ANNOYED YOU BY CRITICIZING YOUR DRINKING: YES
HEADACHE, FULLNESS IN HEAD: NOT PRESENT
NAUSEA AND VOMITING: NO NAUSEA AND NO VOMITING
AGITATION: NORMAL ACTIVITY
AUDIT TOTAL SCORE: 8
TOTAL SCORE: 5
ORIENTATION AND CLOUDING OF SENSORIUM: ORIENTED AND CAN DO SERIAL ADDITIONS
SKIP TO QUESTIONS 9-10: 0
HEADACHE, FULLNESS IN HEAD: NOT PRESENT
TREMOR: NO TREMOR
NAUSEA AND VOMITING: NO NAUSEA AND NO VOMITING
ANXIETY: MILDLY ANXIOUS
AUDIT-C TOTAL SCORE: 5
HOW OFTEN DO YOU HAVE A DRINK CONTAINING ALCOHOL: 4 OR MORE TIMES A WEEK
HOW OFTEN DURING THE LAST YEAR HAVE YOU HAD A FEELING OF GUILT OR REMORSE AFTER DRINKING: LESS THAN MONTHLY
AUDIT TOTAL SCORE: 13
HAS A RELATIVE, FRIEND, DOCTOR, OR ANOTHER HEALTH PROFESSIONAL EXPRESSED CONCERN ABOUT YOUR DRINKING OR SUGGESTED YOU CUT DOWN: NO
PAROXYSMAL SWEATS: BARELY PERCEPTIBLE SWEATING, PALMS MOIST
SUBSTANCE_ABUSE_PAST_12_MONTHS: YES
VISUAL DISTURBANCES: NOT PRESENT
EVER HAD A DRINK FIRST THING IN THE MORNING TO STEADY YOUR NERVES TO GET RID OF A HANGOVER: YES
HOW OFTEN DURING THE LAST YEAR HAVE YOU FAILED TO DO WHAT WAS NORMALLY EXPECTED FROM YOU BECAUSE OF DRINKING: NEVER
VISUAL DISTURBANCES: NOT PRESENT
ORIENTATION AND CLOUDING OF SENSORIUM: DISORIENTED FOR DATA BY NO MORE THAN 2 CALENDAR DAYS
TOTAL SCORE: 6
HOW OFTEN DURING THE LAST YEAR HAVE YOU BEEN UNABLE TO REMEMBER WHAT HAPPENED THE NIGHT BEFORE BECAUSE YOU HAD BEEN DRINKING: LESS THAN MONTHLY
HEADACHE, FULLNESS IN HEAD: NOT PRESENT
ANXIETY: 2
HOW OFTEN DO YOU HAVE 6 OR MORE DRINKS ON ONE OCCASION: LESS THAN MONTHLY
ORIENTATION AND CLOUDING OF SENSORIUM: DISORIENTED FOR DATA BY NO MORE THAN 2 CALENDAR DAYS
HAVE YOU OR SOMEONE ELSE BEEN INJURED AS A RESULT OF YOUR DRINKING: YES, DURING THE LAST YEAR
BLOOD PRESSURE: 169/77
TREMOR: NO TREMOR
AUDITORY DISTURBANCES: NOT PRESENT
HOW OFTEN DURING THE LAST YEAR HAVE YOU FOUND THAT YOU WERE NOT ABLE TO STOP DRINKING ONCE YOU HAD STARTED: LESS THAN MONTHLY
ANXIETY: 2

## 2024-04-23 ASSESSMENT — PAIN DESCRIPTION - LOCATION
LOCATION: NECK
LOCATION: BACK

## 2024-04-23 ASSESSMENT — ENCOUNTER SYMPTOMS
VOMITING: 0
SHORTNESS OF BREATH: 0
NUMBNESS: 0
FLANK PAIN: 0
SLEEP DISTURBANCE: 0
CHILLS: 0
HYPERACTIVE: 0
MYALGIAS: 0
JOINT SWELLING: 0
EYE DISCHARGE: 0
CONFUSION: 1
DIARRHEA: 0
HEMATURIA: 0
CONSTIPATION: 0
SORE THROAT: 0
BACK PAIN: 1
NECK PAIN: 1
FEVER: 0
COUGH: 0
HEADACHES: 0
PALPITATIONS: 0
CHEST TIGHTNESS: 0
TROUBLE SWALLOWING: 0
DYSURIA: 0
DIZZINESS: 0
WHEEZING: 0
WEAKNESS: 0
ABDOMINAL DISTENTION: 0
DIAPHORESIS: 0
ABDOMINAL PAIN: 0
NAUSEA: 0
FREQUENCY: 0
DIFFICULTY URINATING: 0
FATIGUE: 0

## 2024-04-23 ASSESSMENT — COGNITIVE AND FUNCTIONAL STATUS - GENERAL
MOVING TO AND FROM BED TO CHAIR: A LOT
MOVING FROM LYING ON BACK TO SITTING ON SIDE OF FLAT BED WITH BEDRAILS: A LITTLE
PATIENT BASELINE BEDBOUND: NO
TURNING FROM BACK TO SIDE WHILE IN FLAT BAD: A LOT
CLIMB 3 TO 5 STEPS WITH RAILING: A LOT
CLIMB 3 TO 5 STEPS WITH RAILING: A LOT
DRESSING REGULAR UPPER BODY CLOTHING: A LOT
PERSONAL GROOMING: A LITTLE
WALKING IN HOSPITAL ROOM: A LOT
TOILETING: A LOT
TOILETING: A LOT
MOVING TO AND FROM BED TO CHAIR: A LOT
DRESSING REGULAR LOWER BODY CLOTHING: A LOT
DRESSING REGULAR LOWER BODY CLOTHING: A LOT
DAILY ACTIVITIY SCORE: 15
PERSONAL GROOMING: A LITTLE
TURNING FROM BACK TO SIDE WHILE IN FLAT BAD: A LITTLE
MOVING FROM LYING ON BACK TO SITTING ON SIDE OF FLAT BED WITH BEDRAILS: A LOT
HELP NEEDED FOR BATHING: A LOT
DAILY ACTIVITIY SCORE: 15
WALKING IN HOSPITAL ROOM: A LOT
MOBILITY SCORE: 12
STANDING UP FROM CHAIR USING ARMS: A LOT
MOBILITY SCORE: 14
HELP NEEDED FOR BATHING: A LOT
DRESSING REGULAR UPPER BODY CLOTHING: A LOT
STANDING UP FROM CHAIR USING ARMS: A LOT

## 2024-04-23 ASSESSMENT — PAIN - FUNCTIONAL ASSESSMENT
PAIN_FUNCTIONAL_ASSESSMENT: 0-10

## 2024-04-23 ASSESSMENT — PAIN DESCRIPTION - DESCRIPTORS: DESCRIPTORS: CRUSHING;JABBING

## 2024-04-23 NOTE — NURSING NOTE
1700: Pt. Arrived to 3N at this time. Pt. Transferred to the bed with slide board and having excruciating pain in her neck. Paged Eddie at this time and spoke with Elvira Ulloa. Awaiting new orders for pain medications and high blood pressure. Will follow.    1750: Pt. Unable to stand at this time or even sit. Orthostatic BP's to wait until the pt. Has better pain control.    1755: MRI screening form completed at this time.    1820: Pt. Continues to have intense amount of pain at this time in which she is yelling out. She constantly moves around in the bed and moves her legs around as if she is having a muscle spasm. Eddie made aware of this. Call light in reach and bed in lowest position. Alarm on and audible.  at the bedside and updated.    1845: PT. To have seizure pads on her bed which are on order at this time. The pt. Will go for her MRI shortly as well. Will follow.

## 2024-04-23 NOTE — CARE PLAN
Problem: Skin  Goal: Participates in plan/prevention/treatment measures  Outcome: Progressing  Flowsheets (Taken 4/23/2024 1942)  Participates in plan/prevention/treatment measures: Elevate heels     Problem: Pain  Goal: Takes deep breaths with improved pain control throughout the shift  Outcome: Progressing    The patient's goals for the shift include  feel better    The clinical goals for the shift include maintain CIWA score less than 6 throughout end of shift    1900: Patient off unit for cervical MRI. CIWA score missed at this time.   2030: Patient back from MRI, patient writhing in bed, reporting pain in her neck. Patient anxious, sweaty, disoriented to date and situation. Patient administered 0.5 mg ativan per CIWA protocol, VSS at this time. Hard to get patient to cooperate with small tasks like taking PO pills. Seizure pads placed on bed at this time.  2120: Patient medicated with morphine at this time for pain in her neck.   2145: rad ops calling to speak with provider about results of MRI C spine, phone number relayed to Silvia Ulloa CNP  2200: Spoke with Elvira Ulloa CNP and Dr. Min from neurosurgery, patient needing surgery tomorrow on her neck. C collar to be placed. NPO at midnight. Starting on steroids to help with edema.   2230: C collar placed, Elvira Ulloa called  to make him aware of surgery in the morning with Pako [place]    Patient condition stable overnight. Patient C collar remained on throughout end of shift. Patient got 2 doses of dilaudid to help with pain. Patient also reporting better pain after receiving steroids likely due to reduced edema and compression. Patient Aox2-3, scoring low on CIWA scale. Patient safety maintained, seizure pads in place, bed alarm activated.   Plan for patient to go to surgery this morning with Dr. Min

## 2024-04-23 NOTE — H&P
History Of Present Illness  Katya Sandhu is a 74 y.o. female with medical history of dementia, depression, hypertension, brain lesion, hyperlipidemia, hypothyroidism, alcohol withdrawal seizure,  EtOH, and recurrent falls presented to [facility] from home on 4/23/2024 with complaint of back pain and fall.    Patient reported fell last night from the stair.  Was witnessed fall.  She stated she is going to up stairs  and see did not know how to fall due to poor historian. She states rolling on a stair and landed on back. patient did hit head and elbow did not LOC,  no syncope,  not any on blood thinner.  She said her  found on the floor and and was helping to get her up but she is unable to get up due to her back pain.  she did not know how long she has been on the floor while.  Patient reported  she having upper back pain. Pain is worse 10/10. Sharp shooting pain. denies recent fever/chills, cough, cold symptoms, chest pain, palpitations, lightheadedness/dizziness, shortness of breath, abdominal pain, N/V/D, urinary symptoms or leg swelling    Pt is  confused now  alert and oriented to person. Spoke to the  spouse verified her baseline mental status is memory loss and confused h/o dementia. Also verified  alcohol drinking he says she  heavy alcohol drinker  she drinks  4-5 OZ daily.  Spouse reported he tried to get her up in the morning so he could not get up again so he called to EMS.    In ED AST 46 lactate 2.4>2.8 > 1.9, > 865, alcohol level 89 WBC 12.3 UA was abnormal negative nitrate and leukocyte positive. CT head large scalp hematoma noted. CT chest pericardial cyst. patient received 2 L bolus in ER and  Ketoralac  IV one-time dose in ER.  EKG review sinus rhythm with RBBB, no ST-T wave abnormality noted.  Patient is admitting to medical floor for rhabdomyolysis, alcohol withdrawal and fall.        Past Medical History  Past Medical History:   Diagnosis Date    Brain lesion     Depression      ETOH abuse     HLD (hyperlipidemia)     Hypertension     Hypothyroidism        Surgical History  Past Surgical History:   Procedure Laterality Date    BRAIN SURGERY      CT ANGIO NECK  04/24/2022    CT NECK ANGIO W AND WO IV CONTRAST 4/24/2022 [facility]    CT HEAD ANGIO W AND WO IV CONTRAST  04/24/2022    CT HEAD ANGIO W AND WO IV CONTRAST 4/24/2022 [facility]    FOOT SURGERY      TONSILLECTOMY          Social History  Social History     Tobacco Use    Smoking status: Former     Types: Cigarettes    Smokeless tobacco: Never   Substance Use Topics    Alcohol use: Yes     Alcohol/week: 3.0 standard drinks of alcohol     Types: 3 Glasses of wine per week     Comment: 1 glass of vodka sometines    Drug use: Never        Family History  Family History   Problem Relation Name Age of Onset    Other (HTN) Mother      Hypertension Father      Alzheimer's disease Father      Aneurysm Father      Hypertension Sister      Hypertension Brother          Allergies  Penicillins    Review of Systems   Constitutional:  Negative for chills, diaphoresis, fatigue and fever.   HENT:  Negative for congestion, postnasal drip, sore throat and trouble swallowing.    Eyes:  Negative for discharge and visual disturbance.   Respiratory:  Negative for cough, chest tightness, shortness of breath and wheezing.    Cardiovascular:  Negative for chest pain, palpitations and leg swelling.   Gastrointestinal:  Negative for abdominal distention, abdominal pain, constipation, diarrhea, nausea and vomiting.   Genitourinary:  Negative for decreased urine volume, difficulty urinating, dysuria, flank pain, frequency, hematuria and urgency.   Musculoskeletal:  Positive for back pain and neck pain. Negative for joint swelling and myalgias.        Cervical spine tender to touch    Neurological:  Negative for dizziness, weakness, numbness and headaches.   Psychiatric/Behavioral:  Positive for confusion. Negative for sleep disturbance. The  "patient is not hyperactive.        Physical Exam  Constitutional:       General: She is not in acute distress.     Appearance: She is ill-appearing. She is not toxic-appearing or diaphoretic.   HENT:      Head: Normocephalic.      Comments: On the scalp.  Large hematoma noted     Right Ear: External ear normal.      Left Ear: External ear normal.      Nose: Nose normal. No congestion.      Mouth/Throat:      Mouth: Mucous membranes are moist.   Eyes:      General:         Right eye: No discharge.         Left eye: No discharge.      Extraocular Movements: Extraocular movements intact.      Pupils: Pupils are equal, round, and reactive to light.   Neck:      Comments: Cervical spine tenderness with palpation.  Cardiovascular:      Rate and Rhythm: Normal rate and regular rhythm.      Pulses: Normal pulses.      Heart sounds: Normal heart sounds. No murmur heard.     No gallop.   Pulmonary:      Effort: Pulmonary effort is normal.      Breath sounds: Normal breath sounds. No stridor. No wheezing or rhonchi.   Abdominal:      General: Bowel sounds are normal. There is no distension.      Palpations: Abdomen is soft.      Tenderness: There is no abdominal tenderness. There is no guarding.   Musculoskeletal:         General: No swelling.      Cervical back: Tenderness present. No rigidity.      Right lower leg: No edema.      Left lower leg: No edema.   Skin:     General: Skin is warm.      Capillary Refill: Capillary refill takes 2 to 3 seconds.      Findings: Bruising present. No rash.   Neurological:      Mental Status: She is alert. Mental status is at baseline. She is disoriented.      Motor: No weakness.      Comments: Patient is alert and oriented to person.          Last Recorded Vitals  Visit Vitals  BP (!) 200/96 (BP Location: Right arm, Patient Position: Lying)   Pulse 80   Temp 37.1 °C (98.8 °F) (Temporal)   Resp 20   Ht 1.651 m (5' 5\")   Wt 54.9 kg (121 lb 0.5 oz)   LMP  (LMP Unknown)   SpO2 100%   BMI 20.14 " kg/m²   OB Status Postmenopausal   Smoking Status Former   BSA 1.59 m²        Relevant Results  Results for orders placed or performed during the hospital encounter of 04/23/24 (from the past 24 hour(s))   CBC and Auto Differential   Result Value Ref Range    WBC 12.3 (H) 4.4 - 11.3 x10*3/uL    nRBC 0.0 0.0 - 0.0 /100 WBCs    RBC 3.94 (L) 4.00 - 5.20 x10*6/uL    Hemoglobin 12.9 12.0 - 16.0 g/dL    Hematocrit 39.3 36.0 - 46.0 %     80 - 100 fL    MCH 32.7 26.0 - 34.0 pg    MCHC 32.8 32.0 - 36.0 g/dL    RDW 13.6 11.5 - 14.5 %    Platelets 272 150 - 450 x10*3/uL    Neutrophils % 83.0 40.0 - 80.0 %    Immature Granulocytes %, Automated 0.2 0.0 - 0.9 %    Lymphocytes % 7.3 13.0 - 44.0 %    Monocytes % 9.1 2.0 - 10.0 %    Eosinophils % 0.0 0.0 - 6.0 %    Basophils % 0.4 0.0 - 2.0 %    Neutrophils Absolute 10.18 (H) 1.60 - 5.50 x10*3/uL    Immature Granulocytes Absolute, Automated 0.03 0.00 - 0.50 x10*3/uL    Lymphocytes Absolute 0.89 0.80 - 3.00 x10*3/uL    Monocytes Absolute 1.12 (H) 0.05 - 0.80 x10*3/uL    Eosinophils Absolute 0.00 0.00 - 0.40 x10*3/uL    Basophils Absolute 0.05 0.00 - 0.10 x10*3/uL   Comprehensive metabolic panel   Result Value Ref Range    Glucose 128 (H) 74 - 99 mg/dL    Sodium 141 136 - 145 mmol/L    Potassium 4.1 3.5 - 5.3 mmol/L    Chloride 106 98 - 107 mmol/L    Bicarbonate 21 21 - 32 mmol/L    Anion Gap 18 10 - 20 mmol/L    Urea Nitrogen 21 6 - 23 mg/dL    Creatinine 0.57 0.50 - 1.05 mg/dL    eGFR >90 >60 mL/min/1.73m*2    Calcium 9.0 8.6 - 10.3 mg/dL    Albumin 4.7 3.4 - 5.0 g/dL    Alkaline Phosphatase 70 33 - 136 U/L    Total Protein 6.4 6.4 - 8.2 g/dL    AST 46 (H) 9 - 39 U/L    Bilirubin, Total 0.8 0.0 - 1.2 mg/dL    ALT 33 7 - 45 U/L   Lactate   Result Value Ref Range    Lactate 2.4 (H) 0.4 - 2.0 mmol/L   Protime-INR   Result Value Ref Range    Protime 11.3 9.8 - 12.8 seconds    INR 1.0 0.9 - 1.1   Acute Toxicology Panel, Blood   Result Value Ref Range    Acetaminophen 17.2 10.0 -  30.0 ug/mL    Salicylate  <3 4 - 20 mg/dL    Alcohol 89 (H) <=10 mg/dL   Cardiac Enzymes - CPK   Result Value Ref Range    Creatine Kinase 893 (H) 0 - 215 U/L   ECG 12 lead   Result Value Ref Range    Ventricular Rate 80 BPM    Atrial Rate 80 BPM    ID Interval 176 ms    QRS Duration 138 ms    QT Interval 420 ms    QTC Calculation(Bazett) 484 ms    P Axis 79 degrees    R Axis 53 degrees    T Axis 77 degrees    QRS Count 13 beats    Q Onset 222 ms    P Onset 134 ms    P Offset 183 ms    T Offset 432 ms    QTC Fredericia 462 ms   Urinalysis with Reflex Culture and Microscopic   Result Value Ref Range    Color, Urine Yellow Straw, Yellow    Appearance, Urine Clear Clear    Specific Gravity, Urine >1.030 (N) 1.005 - 1.035    pH, Urine 6.0 5.0, 5.5, 6.0, 6.5, 7.0, 7.5, 8.0    Protein, Urine NEGATIVE NEGATIVE, 10 (TRACE) mg/dL    Glucose, Urine NEGATIVE NEGATIVE mg/dL    Blood, Urine NEGATIVE NEGATIVE    Ketones, Urine 10 (1+) (A) NEGATIVE mg/dL    Bilirubin, Urine NEGATIVE NEGATIVE    Urobilinogen, Urine NORM NORM mg/dL    Nitrite, Urine NEGATIVE NEGATIVE    Leukocyte Esterase, Urine 75 Robert/µL (A) NEGATIVE   Microscopic Only, Urine   Result Value Ref Range    WBC, Urine 11-20 (A) 1-5, NONE /HPF    RBC, Urine 3-5 NONE, 1-2, 3-5 /HPF   Lactate   Result Value Ref Range    Lactate 2.8 (H) 0.4 - 2.0 mmol/L   Cardiac Enzymes - CPK   Result Value Ref Range    Creatine Kinase 865 (H) 0 - 215 U/L   Lactate   Result Value Ref Range    Lactate 1.9 0.4 - 2.0 mmol/L      Imaging:  CT head wo IV contrast   Final Result   LARGE SCALP HEMATOMA. SKIN SURFACE OVER THE SCALP HEMATOMA IS NOT   INCLUDED IN THE FIELD OF VIEW. FIELD-OF-VIEW DOES NOT INCLUDE ANY   SUBCUTANEOUS GAS OR SUBCUTANEOUS RADIOPAQUE FOREIGN BODY        NO ACUTE INTRACRANIAL PROCESS. SKULL INTACT        NO ACUTE FRACTURE OR SUBLUXATION IN THE CERVICAL SPINE        THIS REPORT SERVES AS THE DIAGNOSTIC INTERPRETATION FOR TWO EXAMS   PERFORMED CONCURRENTLY: CT BRAIN WITHOUT  IV CONTRAST AND CT CERVICAL   SPINE WITHOUT IV CONTRAST        MACRO:   None        Signed by: Guerrero Dallashina 4/23/2024 10:42 AM   Dictation workstation:   [id number]      CT cervical spine wo IV contrast   Final Result   LARGE SCALP HEMATOMA. SKIN SURFACE OVER THE SCALP HEMATOMA IS NOT   INCLUDED IN THE FIELD OF VIEW. FIELD-OF-VIEW DOES NOT INCLUDE ANY   SUBCUTANEOUS GAS OR SUBCUTANEOUS RADIOPAQUE FOREIGN BODY        NO ACUTE INTRACRANIAL PROCESS. SKULL INTACT        NO ACUTE FRACTURE OR SUBLUXATION IN THE CERVICAL SPINE        THIS REPORT SERVES AS THE DIAGNOSTIC INTERPRETATION FOR TWO EXAMS   PERFORMED CONCURRENTLY: CT BRAIN WITHOUT IV CONTRAST AND CT CERVICAL   SPINE WITHOUT IV CONTRAST        MACRO:   None        Signed by: Guerrero Zurita 4/23/2024 10:42 AM   Dictation workstation:   [id number]      CT chest abdomen pelvis w IV contrast   Final Result   Chest: No acute traumatic injury.   Large cystic focus along the left aspect of the mediastinum suggesting   pericardial cyst.  No other acute cardiopulmonary disease.   Abdomen: No acute traumatic injury and no acute inflammatory changes.   Gallstones versus sludge within the gallbladder.   Pelvis: No acute traumatic injury or inflammatory changes.   Small amount gas in the bladder.  Please correlate for recent   instrumentation.   Thoracic spine: No acute fracture or malalignment.   Lumbar spine: No acute fracture or malalignment.  Degenerative changes   as described.  Findings are greater at L4-5.  Recommend follow-up MRI   for further evaluation unless contraindicated.   Signed by Saulo Castillo,       CT thoracic spine wo IV contrast   Final Result   Chest: No acute traumatic injury.   Large cystic focus along the left aspect of the mediastinum suggesting   pericardial cyst.  No other acute cardiopulmonary disease.   Abdomen: No acute traumatic injury and no acute inflammatory changes.   Gallstones versus sludge within the gallbladder.   Pelvis: No acute  traumatic injury or inflammatory changes.   Small amount gas in the bladder.  Please correlate for recent   instrumentation.   Thoracic spine: No acute fracture or malalignment.   Lumbar spine: No acute fracture or malalignment.  Degenerative changes   as described.  Findings are greater at L4-5.  Recommend follow-up MRI   for further evaluation unless contraindicated.   Signed by Saulo Castillo, DO      CT lumbar spine wo IV contrast   Final Result   Chest: No acute traumatic injury.   Large cystic focus along the left aspect of the mediastinum suggesting   pericardial cyst.  No other acute cardiopulmonary disease.   Abdomen: No acute traumatic injury and no acute inflammatory changes.   Gallstones versus sludge within the gallbladder.   Pelvis: No acute traumatic injury or inflammatory changes.   Small amount gas in the bladder.  Please correlate for recent   instrumentation.   Thoracic spine: No acute fracture or malalignment.   Lumbar spine: No acute fracture or malalignment.  Degenerative changes   as described.  Findings are greater at L4-5.  Recommend follow-up MRI   for further evaluation unless contraindicated.   Signed by Saulo Castillo, DO      XR elbow right 3+ views   Final Result   No acute osseous abnormality.   Signed by Teo Ponce MD      MR cervical spine wo IV contrast    (Results Pending)     EKG:  Encounter Date: 04/23/24   ECG 12 lead   Result Value    Ventricular Rate 80    Atrial Rate 80    ID Interval 176    QRS Duration 138    QT Interval 420    QTC Calculation(Bazett) 484    P Axis 79    R Axis 53    T Axis 77    QRS Count 13    Q Onset 222    P Onset 134    P Offset 183    T Offset 432    QTC Fredericia 462    Narrative    Normal sinus rhythm  Right bundle branch block  Abnormal ECG  When compared with ECG of 01-MAY-2022 01:32,  QRS voltage has increased  Nonspecific T wave abnormality no longer evident in Inferior leads  T wave amplitude has increased in Anterolateral leads      Echo:  No results found for this or any previous visit.    Home Medications  Prior to Admission medications    Medication Sig Start Date End Date Taking? Authorizing Provider   levothyroxine (Synthroid, Levoxyl) 50 mcg tablet Take 1 tablet (50 mcg) by mouth once daily.   Yes Historical Provider, MD   meloxicam (Mobic) 7.5 mg tablet Take 1 tablet (7.5 mg) by mouth once daily as needed for mild pain (1 - 3).   Yes Historical Provider, MD   spironolactone (Aldactone) 50 mg tablet Take 1 tablet (50 mg) by mouth once daily.   Yes Historical Provider, MD   SUMAtriptan (Imitrex) 25 mg tablet Take 1 tablet (25 mg) by mouth 1 time if needed for migraine. May repeat dose once in 2 hours if no relief.  Do not exceed 2 doses in 24 hours.   Yes Historical Provider, MD   venlafaxine XR (Effexor-XR) 75 mg 24 hr capsule Take 1 capsule (75 mg) by mouth once daily.   Yes Historical Provider, MD   zonisamide (Zonegran) 100 mg capsule Take 2 capsules (200 mg) by mouth once daily. 3/21/24 3/21/25 Yes Negin Terrazas MD   aspirin 81 mg chewable tablet Chew 1 tablet (81 mg) once daily. 5/11/22 4/23/24  Historical Provider, MD       Medications  Scheduled medications  diazePAM, 5 mg, intravenous, q6h  folic acid, 1 mg, oral, Daily  levothyroxine, 50 mcg, oral, Daily  metoprolol, 5 mg, intravenous, q6h  multivitamin with minerals, 1 tablet, oral, Daily  polyethylene glycol, 17 g, oral, Daily  spironolactone, 50 mg, oral, Daily  [START ON 4/26/2024] thiamine, 100 mg, oral, Daily  thiamine, 100 mg, intravenous, Daily  venlafaxine XR, 75 mg, oral, Daily  zonisamide, 200 mg, oral, Daily      Continuous medications  lactated Ringer's, 100 mL/hr, Last Rate: 100 mL/hr (04/23/24 8383)      PRN medications  acetaminophen, 650 mg, q4h PRN   Or  acetaminophen, 650 mg, q4h PRN   Or  acetaminophen, 650 mg, q4h PRN  LORazepam, 0.5 mg, q2h PRN   Or  LORazepam, 1 mg, q2h PRN   Or  LORazepam, 1 mg, q2h PRN  morphine, 2 mg, q3h PRN  SUMAtriptan, 25 mg,  Once PRN        Assessment/Plan   Principal Problem:    Rhabdomyolysis  Active Problems:    ETOH abuse    Fall    Closed head injury    Elevated lactic acid level    Neck pain    Alcohol withdrawal seizure, with unspecified complication (Multi)        Plan:    Rhabdomyolysis  Admitting to medical telemetry floor  IV hydration  ml/hr  Monitoring CK every 6 x 3  Ammonia level ordered      EtOH abuse  CIWA precautions  Seizure precaution  Aspiration precaution  Valium 5 mg IV scheduled for  history of withdrawal alcohol seizure  Psychiatry consulted    Close head injury  Avoid antiplatelet or anticoagulant medication   Avoid NSAIDs  monitoring trend    Fall  Fall precaution  Bed alaram  PT OT ordered    Neck pain  MRI cervical spine ordered   As needed pain medication -morphine 2 mg every 3hrs  PT OT consulted    Elevated lactate received   2 L fluids received in ER   monitoring trend  Vital sign every 4 hours  Telemetry monitoring  Morning lab order          VTE prophylaxis:   DVT prophylaxis: Deferred, not warranted due to patient's condition  Pt has large scalp hematoma  Ordered SCDs.     Medication reconciliation to be completed when home medications are verified by pharmacy.   See additional orders for further plan of care.   Further evaluation and management per attending and consulting physicians.      Code Status  Full code dicussed with patient and spouse- remain Full code.      I spent 60 minutes in the professional and overall care of this patient.      Sheri Palm, APRN-[facility]  Pager [phone]

## 2024-04-23 NOTE — ED PROVIDER NOTES
HPI   Chief Complaint   Patient presents with    Fall       This is a 74-year-old female who presents to the ED brought in by EMS after unwitnessed fall, she was found at the bottom of the stairs,  and EMS reporting that she likely fell last night and did have some alcoholic drinks.  At the time of exam she is complaining of right elbow and upper back pain.  She did does not know how she got to the ground or fell                          Melissa Coma Scale Score: 15                     Patient History   History reviewed. No pertinent past medical history.  Past Surgical History:   Procedure Laterality Date    CT ANGIO NECK  4/24/2022    CT NECK ANGIO W AND WO IV CONTRAST 4/24/2022 [facility]    CT HEAD ANGIO W AND WO IV CONTRAST  4/24/2022    CT HEAD ANGIO W AND WO IV CONTRAST 4/24/2022 [facility]     No family history on file.  Social History     Tobacco Use    Smoking status: Former     Types: Cigarettes    Smokeless tobacco: Never   Substance Use Topics    Alcohol use: Not on file    Drug use: Defer       Physical Exam   ED Triage Vitals [04/23/24 0824]   Temperature Heart Rate Respirations BP   36.4 °C (97.5 °F) 81 18 154/86      Pulse Ox Temp Source Heart Rate Source Patient Position   100 % Temporal Monitor Lying      BP Location FiO2 (%)     Right arm --       Physical Exam  Constitutional:       Appearance: Normal appearance.   HENT:      Head: Normocephalic and atraumatic.      Mouth/Throat:      Mouth: Mucous membranes are moist.   Eyes:      Extraocular Movements: Extraocular movements intact.   Cardiovascular:      Rate and Rhythm: Normal rate and regular rhythm.      Heart sounds: Normal heart sounds. No murmur heard.  Pulmonary:      Effort: Pulmonary effort is normal. No respiratory distress.      Breath sounds: Normal breath sounds. No wheezing.   Abdominal:      General: There is no distension.      Palpations: Abdomen is soft.      Tenderness: There is no abdominal  tenderness. There is no guarding.   Musculoskeletal:      Right lower leg: No edema.      Left lower leg: No edema.      Comments: There is pain to palpation of the right sided olecranon, and diffuse midline spine tenderness in the thoracic region.   Skin:     General: Skin is warm and dry.   Neurological:      General: No focal deficit present.      Mental Status: She is alert and oriented to person, place, and time.      Motor: No weakness.   Psychiatric:         Mood and Affect: Mood normal.         Behavior: Behavior normal.         ED Course & MDM   Diagnoses as of 04/23/24 1549   Rhabdomyolysis   Closed head injury, initial encounter       Medical Decision Making  This patient presents to the ED brought in by EMS after a fall and where she was found down by her  at the bottom of the stairs.  Per EMS report, she was down for prolonged period of time.  We will obtain broad imaging workup but due to her unreliable historian status and intoxication.  Will also obtain laboratory studies    Laboratory studies are notable most for mildly elevated ethanol level, elevated CK consistent with mild rhabdo myelitis.  There was also elevated lactate.  CT scans were obtained, CT scan of head and cervical spine no demonstrating intracranial hemorrhage or cervical spine fracture, scalp hematoma noted, CT chest abdomen pelvis did not demonstrate any traumatic injury although a large pericardial cyst was noted incidentally.  The x-ray of the right elbow did not reveal any fracture.    Her laboratory test were consistent with dehydration and rhabdomyolysis, we jason CK which was mildly downtrending, initial repeat lactate was shown to be mildly elevated, but repeat showed that it was downtrending.  She started to complain of back pain and was given Toradol.  She was still having difficulty ambulating, so at this time she represents an unsafe discharge home Case was discussed with on-call medicine attending Dr. Arriaga, who  accepts patient under his medicine service for further workup management    Discussed with the attending  Erlin Jolly DO PGY-4  Emergency Medicine        Procedure  Procedures     Erlin Jolly DO  Resident  04/23/24 5507

## 2024-04-24 ENCOUNTER — ANESTHESIA (OUTPATIENT)
Dept: OPERATING ROOM | Facility: HOSPITAL | Age: 74
DRG: 028 | End: 2024-04-24
Payer: MEDICARE

## 2024-04-24 ENCOUNTER — APPOINTMENT (OUTPATIENT)
Dept: RADIOLOGY | Facility: HOSPITAL | Age: 74
DRG: 028 | End: 2024-04-24
Payer: MEDICARE

## 2024-04-24 ENCOUNTER — ANESTHESIA EVENT (OUTPATIENT)
Dept: OPERATING ROOM | Facility: HOSPITAL | Age: 74
DRG: 028 | End: 2024-04-24
Payer: MEDICARE

## 2024-04-24 LAB
ANION GAP SERPL CALC-SCNC: 14 MMOL/L (ref 10–20)
BACTERIA UR CULT: NORMAL
BUN SERPL-MCNC: 19 MG/DL (ref 6–23)
CALCIUM SERPL-MCNC: 8.8 MG/DL (ref 8.6–10.3)
CHLORIDE SERPL-SCNC: 105 MMOL/L (ref 98–107)
CK SERPL-CCNC: 482 U/L (ref 0–215)
CK SERPL-CCNC: 708 U/L (ref 0–215)
CO2 SERPL-SCNC: 20 MMOL/L (ref 21–32)
CREAT SERPL-MCNC: 0.46 MG/DL (ref 0.5–1.05)
EGFRCR SERPLBLD CKD-EPI 2021: >90 ML/MIN/1.73M*2
ERYTHROCYTE [DISTWIDTH] IN BLOOD BY AUTOMATED COUNT: 13.4 % (ref 11.5–14.5)
GLUCOSE SERPL-MCNC: 153 MG/DL (ref 74–99)
HCT VFR BLD AUTO: 35.9 % (ref 36–46)
HGB BLD-MCNC: 12.2 G/DL (ref 12–16)
MAGNESIUM SERPL-MCNC: 1.71 MG/DL (ref 1.6–2.4)
MCH RBC QN AUTO: 33.4 PG (ref 26–34)
MCHC RBC AUTO-ENTMCNC: 34 G/DL (ref 32–36)
MCV RBC AUTO: 98 FL (ref 80–100)
NRBC BLD-RTO: 0 /100 WBCS (ref 0–0)
PHOSPHATE SERPL-MCNC: 3.7 MG/DL (ref 2.5–4.9)
PLATELET # BLD AUTO: 249 X10*3/UL (ref 150–450)
POTASSIUM SERPL-SCNC: 4 MMOL/L (ref 3.5–5.3)
RBC # BLD AUTO: 3.65 X10*6/UL (ref 4–5.2)
SODIUM SERPL-SCNC: 135 MMOL/L (ref 136–145)
WBC # BLD AUTO: 8.1 X10*3/UL (ref 4.4–11.3)

## 2024-04-24 PROCEDURE — 83735 ASSAY OF MAGNESIUM: CPT | Performed by: NURSE PRACTITIONER

## 2024-04-24 PROCEDURE — A22551 PR ARTHRODESIS ANT INTERBODY INC DISCECTOMY, CERVICAL BELOW C2: Performed by: ANESTHESIOLOGIST ASSISTANT

## 2024-04-24 PROCEDURE — 80048 BASIC METABOLIC PNL TOTAL CA: CPT

## 2024-04-24 PROCEDURE — 7100000002 HC RECOVERY ROOM TIME - EACH INCREMENTAL 1 MINUTE: Performed by: STUDENT IN AN ORGANIZED HEALTH CARE EDUCATION/TRAINING PROGRAM

## 2024-04-24 PROCEDURE — 84100 ASSAY OF PHOSPHORUS: CPT | Performed by: NURSE PRACTITIONER

## 2024-04-24 PROCEDURE — 82550 ASSAY OF CK (CPK): CPT

## 2024-04-24 PROCEDURE — 2500000004 HC RX 250 GENERAL PHARMACY W/ HCPCS (ALT 636 FOR OP/ED)

## 2024-04-24 PROCEDURE — 3700000002 HC GENERAL ANESTHESIA TIME - EACH INCREMENTAL 1 MINUTE: Performed by: STUDENT IN AN ORGANIZED HEALTH CARE EDUCATION/TRAINING PROGRAM

## 2024-04-24 PROCEDURE — 22845 INSERT SPINE FIXATION DEVICE: CPT | Performed by: STUDENT IN AN ORGANIZED HEALTH CARE EDUCATION/TRAINING PROGRAM

## 2024-04-24 PROCEDURE — 2500000005 HC RX 250 GENERAL PHARMACY W/O HCPCS

## 2024-04-24 PROCEDURE — 2500000004 HC RX 250 GENERAL PHARMACY W/ HCPCS (ALT 636 FOR OP/ED): Performed by: STUDENT IN AN ORGANIZED HEALTH CARE EDUCATION/TRAINING PROGRAM

## 2024-04-24 PROCEDURE — 2500000005 HC RX 250 GENERAL PHARMACY W/O HCPCS: Performed by: NURSE PRACTITIONER

## 2024-04-24 PROCEDURE — 2500000004 HC RX 250 GENERAL PHARMACY W/ HCPCS (ALT 636 FOR OP/ED): Performed by: ANESTHESIOLOGIST ASSISTANT

## 2024-04-24 PROCEDURE — 20931 SP BONE ALGRFT STRUCT ADD-ON: CPT | Performed by: STUDENT IN AN ORGANIZED HEALTH CARE EDUCATION/TRAINING PROGRAM

## 2024-04-24 PROCEDURE — 2500000004 HC RX 250 GENERAL PHARMACY W/ HCPCS (ALT 636 FOR OP/ED): Performed by: NURSE PRACTITIONER

## 2024-04-24 PROCEDURE — 1200000002 HC GENERAL ROOM WITH TELEMETRY DAILY

## 2024-04-24 PROCEDURE — 2500000001 HC RX 250 WO HCPCS SELF ADMINISTERED DRUGS (ALT 637 FOR MEDICARE OP): Performed by: STUDENT IN AN ORGANIZED HEALTH CARE EDUCATION/TRAINING PROGRAM

## 2024-04-24 PROCEDURE — 99100 ANES PT EXTEME AGE<1 YR&>70: CPT | Performed by: ANESTHESIOLOGY

## 2024-04-24 PROCEDURE — 2500000005 HC RX 250 GENERAL PHARMACY W/O HCPCS: Performed by: STUDENT IN AN ORGANIZED HEALTH CARE EDUCATION/TRAINING PROGRAM

## 2024-04-24 PROCEDURE — 2720000007 HC OR 272 NO HCPCS: Performed by: STUDENT IN AN ORGANIZED HEALTH CARE EDUCATION/TRAINING PROGRAM

## 2024-04-24 PROCEDURE — 2780000003 HC OR 278 NO HCPCS: Performed by: STUDENT IN AN ORGANIZED HEALTH CARE EDUCATION/TRAINING PROGRAM

## 2024-04-24 PROCEDURE — 3600000018 HC OR TIME - INITIAL BASE CHARGE - PROCEDURE LEVEL SIX: Performed by: STUDENT IN AN ORGANIZED HEALTH CARE EDUCATION/TRAINING PROGRAM

## 2024-04-24 PROCEDURE — 85027 COMPLETE CBC AUTOMATED: CPT

## 2024-04-24 PROCEDURE — C1713 ANCHOR/SCREW BN/BN,TIS/BN: HCPCS | Performed by: STUDENT IN AN ORGANIZED HEALTH CARE EDUCATION/TRAINING PROGRAM

## 2024-04-24 PROCEDURE — 0RT30ZZ RESECTION OF CERVICAL VERTEBRAL DISC, OPEN APPROACH: ICD-10-PCS | Performed by: STUDENT IN AN ORGANIZED HEALTH CARE EDUCATION/TRAINING PROGRAM

## 2024-04-24 PROCEDURE — 7100000001 HC RECOVERY ROOM TIME - INITIAL BASE CHARGE: Performed by: STUDENT IN AN ORGANIZED HEALTH CARE EDUCATION/TRAINING PROGRAM

## 2024-04-24 PROCEDURE — 0RG10A0 FUSION OF CERVICAL VERTEBRAL JOINT WITH INTERBODY FUSION DEVICE, ANTERIOR APPROACH, ANTERIOR COLUMN, OPEN APPROACH: ICD-10-PCS | Performed by: STUDENT IN AN ORGANIZED HEALTH CARE EDUCATION/TRAINING PROGRAM

## 2024-04-24 PROCEDURE — 2500000005 HC RX 250 GENERAL PHARMACY W/O HCPCS: Performed by: ANESTHESIOLOGIST ASSISTANT

## 2024-04-24 PROCEDURE — 36415 COLL VENOUS BLD VENIPUNCTURE: CPT

## 2024-04-24 PROCEDURE — 22326 TREAT NECK SPINE FRACTURE: CPT | Performed by: STUDENT IN AN ORGANIZED HEALTH CARE EDUCATION/TRAINING PROGRAM

## 2024-04-24 PROCEDURE — 3600000017 HC OR TIME - EACH INCREMENTAL 1 MINUTE - PROCEDURE LEVEL SIX: Performed by: STUDENT IN AN ORGANIZED HEALTH CARE EDUCATION/TRAINING PROGRAM

## 2024-04-24 PROCEDURE — 3700000001 HC GENERAL ANESTHESIA TIME - INITIAL BASE CHARGE: Performed by: STUDENT IN AN ORGANIZED HEALTH CARE EDUCATION/TRAINING PROGRAM

## 2024-04-24 PROCEDURE — A22551 PR ARTHRODESIS ANT INTERBODY INC DISCECTOMY, CERVICAL BELOW C2: Performed by: ANESTHESIOLOGY

## 2024-04-24 PROCEDURE — 22551 ARTHRD ANT NTRBDY CERVICAL: CPT | Performed by: STUDENT IN AN ORGANIZED HEALTH CARE EDUCATION/TRAINING PROGRAM

## 2024-04-24 DEVICE — IMPLANTABLE DEVICE: Type: IMPLANTABLE DEVICE | Site: SPINE CERVICAL | Status: FUNCTIONAL

## 2024-04-24 DEVICE — PUTTY ATTRAX, 1CC US: Type: IMPLANTABLE DEVICE | Site: SPINE CERVICAL | Status: FUNCTIONAL

## 2024-04-24 DEVICE — SCREW, ACP, SELF DRILL, 3.5 X 17MM, VARIABLE: Type: IMPLANTABLE DEVICE | Site: SPINE CERVICAL | Status: FUNCTIONAL

## 2024-04-24 RX ORDER — LIDOCAINE HYDROCHLORIDE 20 MG/ML
INJECTION, SOLUTION INFILTRATION; PERINEURAL AS NEEDED
Status: DISCONTINUED | OUTPATIENT
Start: 2024-04-24 | End: 2024-04-24

## 2024-04-24 RX ORDER — DEXTROSE 50 % IN WATER (D50W) INTRAVENOUS SYRINGE
12.5
Status: DISCONTINUED | OUTPATIENT
Start: 2024-04-24 | End: 2024-05-01 | Stop reason: HOSPADM

## 2024-04-24 RX ORDER — CEFAZOLIN SODIUM 2 G/100ML
INJECTION, SOLUTION INTRAVENOUS AS NEEDED
Status: DISCONTINUED | OUTPATIENT
Start: 2024-04-24 | End: 2024-04-24

## 2024-04-24 RX ORDER — LORAZEPAM 2 MG/ML
0.5 INJECTION INTRAMUSCULAR EVERY 2 HOUR PRN
Status: DISCONTINUED | OUTPATIENT
Start: 2024-04-24 | End: 2024-04-26

## 2024-04-24 RX ORDER — LANOLIN ALCOHOL/MO/W.PET/CERES
100 CREAM (GRAM) TOPICAL DAILY
Status: DISCONTINUED | OUTPATIENT
Start: 2024-04-27 | End: 2024-04-24

## 2024-04-24 RX ORDER — ONDANSETRON HYDROCHLORIDE 2 MG/ML
4 INJECTION, SOLUTION INTRAVENOUS EVERY 8 HOURS PRN
Status: DISCONTINUED | OUTPATIENT
Start: 2024-04-24 | End: 2024-05-01 | Stop reason: HOSPADM

## 2024-04-24 RX ORDER — FOLIC ACID 1 MG/1
1 TABLET ORAL DAILY
Status: DISCONTINUED | OUTPATIENT
Start: 2024-04-24 | End: 2024-04-24

## 2024-04-24 RX ORDER — FOLIC ACID 1 MG/1
1 TABLET ORAL DAILY
Status: DISCONTINUED | OUTPATIENT
Start: 2024-04-24 | End: 2024-05-01 | Stop reason: HOSPADM

## 2024-04-24 RX ORDER — SODIUM CHLORIDE, SODIUM LACTATE, POTASSIUM CHLORIDE, CALCIUM CHLORIDE 600; 310; 30; 20 MG/100ML; MG/100ML; MG/100ML; MG/100ML
100 INJECTION, SOLUTION INTRAVENOUS CONTINUOUS
Status: DISCONTINUED | OUTPATIENT
Start: 2024-04-24 | End: 2024-04-24 | Stop reason: HOSPADM

## 2024-04-24 RX ORDER — HYDRALAZINE HYDROCHLORIDE 20 MG/ML
5 INJECTION INTRAMUSCULAR; INTRAVENOUS EVERY 30 MIN PRN
Status: DISCONTINUED | OUTPATIENT
Start: 2024-04-24 | End: 2024-04-24 | Stop reason: HOSPADM

## 2024-04-24 RX ORDER — LORAZEPAM 2 MG/ML
1 INJECTION INTRAMUSCULAR EVERY 2 HOUR PRN
Status: DISCONTINUED | OUTPATIENT
Start: 2024-04-24 | End: 2024-04-26

## 2024-04-24 RX ORDER — ONDANSETRON HYDROCHLORIDE 2 MG/ML
4 INJECTION, SOLUTION INTRAVENOUS ONCE AS NEEDED
Status: DISCONTINUED | OUTPATIENT
Start: 2024-04-24 | End: 2024-04-24 | Stop reason: HOSPADM

## 2024-04-24 RX ORDER — LORAZEPAM 2 MG/ML
2 INJECTION INTRAMUSCULAR EVERY 2 HOUR PRN
Status: DISCONTINUED | OUTPATIENT
Start: 2024-04-24 | End: 2024-04-26

## 2024-04-24 RX ORDER — ACETAMINOPHEN 650 MG/1
650 SUPPOSITORY RECTAL EVERY 6 HOURS
Status: DISCONTINUED | OUTPATIENT
Start: 2024-04-24 | End: 2024-04-24

## 2024-04-24 RX ORDER — MAGNESIUM SULFATE HEPTAHYDRATE 40 MG/ML
4 INJECTION, SOLUTION INTRAVENOUS ONCE
Status: COMPLETED | OUTPATIENT
Start: 2024-04-24 | End: 2024-04-24

## 2024-04-24 RX ORDER — ACETAMINOPHEN 325 MG/1
650 TABLET ORAL EVERY 4 HOURS PRN
Status: DISCONTINUED | OUTPATIENT
Start: 2024-04-24 | End: 2024-05-01 | Stop reason: HOSPADM

## 2024-04-24 RX ORDER — ROCURONIUM BROMIDE 50 MG/5 ML
SYRINGE (ML) INTRAVENOUS AS NEEDED
Status: DISCONTINUED | OUTPATIENT
Start: 2024-04-24 | End: 2024-04-24

## 2024-04-24 RX ORDER — MULTIVIT-MIN/IRON FUM/FOLIC AC 7.5 MG-4
1 TABLET ORAL DAILY
Status: DISCONTINUED | OUTPATIENT
Start: 2024-04-24 | End: 2024-04-24

## 2024-04-24 RX ORDER — HYDROMORPHONE HYDROCHLORIDE 1 MG/ML
INJECTION, SOLUTION INTRAMUSCULAR; INTRAVENOUS; SUBCUTANEOUS AS NEEDED
Status: DISCONTINUED | OUTPATIENT
Start: 2024-04-24 | End: 2024-04-24

## 2024-04-24 RX ORDER — PHENYLEPHRINE HCL IN 0.9% NACL 1 MG/10 ML
SYRINGE (ML) INTRAVENOUS AS NEEDED
Status: DISCONTINUED | OUTPATIENT
Start: 2024-04-24 | End: 2024-04-24

## 2024-04-24 RX ORDER — PROPOFOL 10 MG/ML
INJECTION, EMULSION INTRAVENOUS AS NEEDED
Status: DISCONTINUED | OUTPATIENT
Start: 2024-04-24 | End: 2024-04-24

## 2024-04-24 RX ORDER — SODIUM CHLORIDE 9 MG/ML
75 INJECTION, SOLUTION INTRAVENOUS CONTINUOUS
Status: DISCONTINUED | OUTPATIENT
Start: 2024-04-24 | End: 2024-04-25

## 2024-04-24 RX ORDER — NALOXONE HYDROCHLORIDE 0.4 MG/ML
0.2 INJECTION, SOLUTION INTRAMUSCULAR; INTRAVENOUS; SUBCUTANEOUS EVERY 5 MIN PRN
Status: DISCONTINUED | OUTPATIENT
Start: 2024-04-24 | End: 2024-05-01 | Stop reason: HOSPADM

## 2024-04-24 RX ORDER — ONDANSETRON 4 MG/1
4 TABLET, ORALLY DISINTEGRATING ORAL EVERY 8 HOURS PRN
Status: DISCONTINUED | OUTPATIENT
Start: 2024-04-24 | End: 2024-05-01 | Stop reason: HOSPADM

## 2024-04-24 RX ORDER — THIAMINE HYDROCHLORIDE 100 MG/ML
100 INJECTION, SOLUTION INTRAMUSCULAR; INTRAVENOUS DAILY
Status: COMPLETED | OUTPATIENT
Start: 2024-04-24 | End: 2024-04-26

## 2024-04-24 RX ORDER — MIDAZOLAM HYDROCHLORIDE 1 MG/ML
1 INJECTION, SOLUTION INTRAMUSCULAR; INTRAVENOUS ONCE AS NEEDED
Status: DISCONTINUED | OUTPATIENT
Start: 2024-04-24 | End: 2024-04-24 | Stop reason: HOSPADM

## 2024-04-24 RX ORDER — DEXTROSE 50 % IN WATER (D50W) INTRAVENOUS SYRINGE
25
Status: DISCONTINUED | OUTPATIENT
Start: 2024-04-24 | End: 2024-05-01 | Stop reason: HOSPADM

## 2024-04-24 RX ORDER — LIDOCAINE HYDROCHLORIDE AND EPINEPHRINE 10; 10 MG/ML; UG/ML
INJECTION, SOLUTION INFILTRATION; PERINEURAL AS NEEDED
Status: DISCONTINUED | OUTPATIENT
Start: 2024-04-24 | End: 2024-04-24 | Stop reason: HOSPADM

## 2024-04-24 RX ORDER — OXYCODONE HYDROCHLORIDE 10 MG/1
10 TABLET ORAL EVERY 4 HOURS PRN
Status: DISCONTINUED | OUTPATIENT
Start: 2024-04-24 | End: 2024-04-30

## 2024-04-24 RX ORDER — MIDAZOLAM HYDROCHLORIDE 1 MG/ML
INJECTION, SOLUTION INTRAMUSCULAR; INTRAVENOUS AS NEEDED
Status: DISCONTINUED | OUTPATIENT
Start: 2024-04-24 | End: 2024-04-24

## 2024-04-24 RX ORDER — FENTANYL CITRATE 50 UG/ML
INJECTION, SOLUTION INTRAMUSCULAR; INTRAVENOUS AS NEEDED
Status: DISCONTINUED | OUTPATIENT
Start: 2024-04-24 | End: 2024-04-24

## 2024-04-24 RX ORDER — ALBUTEROL SULFATE 0.83 MG/ML
2.5 SOLUTION RESPIRATORY (INHALATION) ONCE AS NEEDED
Status: DISCONTINUED | OUTPATIENT
Start: 2024-04-24 | End: 2024-04-24 | Stop reason: HOSPADM

## 2024-04-24 RX ORDER — OXYCODONE HYDROCHLORIDE 5 MG/1
5 TABLET ORAL EVERY 4 HOURS PRN
Status: DISCONTINUED | OUTPATIENT
Start: 2024-04-24 | End: 2024-04-30

## 2024-04-24 RX ORDER — POLYETHYLENE GLYCOL 3350 17 G/17G
17 POWDER, FOR SOLUTION ORAL DAILY
Status: DISCONTINUED | OUTPATIENT
Start: 2024-04-24 | End: 2024-05-01 | Stop reason: HOSPADM

## 2024-04-24 RX ORDER — CYCLOBENZAPRINE HCL 5 MG
5 TABLET ORAL 3 TIMES DAILY
Status: DISCONTINUED | OUTPATIENT
Start: 2024-04-24 | End: 2024-04-30

## 2024-04-24 RX ORDER — HEPARIN SODIUM 5000 [USP'U]/ML
5000 INJECTION, SOLUTION INTRAVENOUS; SUBCUTANEOUS EVERY 8 HOURS
Status: DISCONTINUED | OUTPATIENT
Start: 2024-04-25 | End: 2024-05-01 | Stop reason: HOSPADM

## 2024-04-24 RX ORDER — MULTIVIT-MIN/IRON FUM/FOLIC AC 7.5 MG-4
1 TABLET ORAL DAILY
Status: DISCONTINUED | OUTPATIENT
Start: 2024-04-24 | End: 2024-05-01 | Stop reason: HOSPADM

## 2024-04-24 RX ORDER — GLYCOPYRROLATE 0.2 MG/ML
INJECTION INTRAMUSCULAR; INTRAVENOUS AS NEEDED
Status: DISCONTINUED | OUTPATIENT
Start: 2024-04-24 | End: 2024-04-24

## 2024-04-24 RX ADMIN — CEFAZOLIN SODIUM 2 G: 2 INJECTION, SOLUTION INTRAVENOUS at 14:15

## 2024-04-24 RX ADMIN — METOPROLOL TARTRATE 5 MG: 5 INJECTION INTRAVENOUS at 12:31

## 2024-04-24 RX ADMIN — Medication 50 MG: at 14:46

## 2024-04-24 RX ADMIN — FENTANYL CITRATE 50 MCG: 50 INJECTION, SOLUTION INTRAMUSCULAR; INTRAVENOUS at 14:42

## 2024-04-24 RX ADMIN — Medication 50 MG: at 14:00

## 2024-04-24 RX ADMIN — METOPROLOL TARTRATE 5 MG: 5 INJECTION INTRAVENOUS at 05:00

## 2024-04-24 RX ADMIN — DIAZEPAM 5 MG: 10 INJECTION, SOLUTION INTRAMUSCULAR; INTRAVENOUS at 12:31

## 2024-04-24 RX ADMIN — Medication 200 MCG: at 15:00

## 2024-04-24 RX ADMIN — HYDROMORPHONE HYDROCHLORIDE 0.5 MG: 1 INJECTION, SOLUTION INTRAMUSCULAR; INTRAVENOUS; SUBCUTANEOUS at 05:05

## 2024-04-24 RX ADMIN — DIAZEPAM 5 MG: 10 INJECTION, SOLUTION INTRAMUSCULAR; INTRAVENOUS at 05:28

## 2024-04-24 RX ADMIN — LIDOCAINE 4% 1 PATCH: 40 PATCH TOPICAL at 09:22

## 2024-04-24 RX ADMIN — HYDROMORPHONE HYDROCHLORIDE 0.5 MG: 1 INJECTION, SOLUTION INTRAMUSCULAR; INTRAVENOUS; SUBCUTANEOUS at 09:35

## 2024-04-24 RX ADMIN — DEXAMETHASONE SODIUM PHOSPHATE 4 MG: 4 INJECTION, SOLUTION INTRAMUSCULAR; INTRAVENOUS at 14:22

## 2024-04-24 RX ADMIN — SUGAMMADEX 200 MG: 100 INJECTION, SOLUTION INTRAVENOUS at 15:30

## 2024-04-24 RX ADMIN — POLYETHYLENE GLYCOL 3350 17 G: 17 POWDER, FOR SOLUTION ORAL at 17:56

## 2024-04-24 RX ADMIN — CYCLOBENZAPRINE HYDROCHLORIDE 5 MG: 5 TABLET, FILM COATED ORAL at 20:49

## 2024-04-24 RX ADMIN — SODIUM CHLORIDE 75 ML/HR: 9 INJECTION, SOLUTION INTRAVENOUS at 17:56

## 2024-04-24 RX ADMIN — HYDROMORPHONE HYDROCHLORIDE 0.5 MG: 1 INJECTION, SOLUTION INTRAMUSCULAR; INTRAVENOUS; SUBCUTANEOUS at 13:56

## 2024-04-24 RX ADMIN — GLYCOPYRROLATE 0.2 MG: 0.2 INJECTION, SOLUTION INTRAMUSCULAR; INTRAVENOUS at 13:56

## 2024-04-24 RX ADMIN — MAGNESIUM SULFATE IN WATER 4 G: 40 INJECTION, SOLUTION INTRAVENOUS at 09:28

## 2024-04-24 RX ADMIN — OXYCODONE HYDROCHLORIDE 5 MG: 5 TABLET ORAL at 21:39

## 2024-04-24 RX ADMIN — PROPOFOL 200 MG: 10 INJECTION, EMULSION INTRAVENOUS at 14:00

## 2024-04-24 RX ADMIN — LIDOCAINE HYDROCHLORIDE 100 MG: 20 INJECTION, SOLUTION INFILTRATION; PERINEURAL at 14:00

## 2024-04-24 RX ADMIN — Medication 30 MG: at 13:58

## 2024-04-24 RX ADMIN — MIDAZOLAM 2 MG: 1 INJECTION INTRAMUSCULAR; INTRAVENOUS at 13:50

## 2024-04-24 RX ADMIN — HYDROMORPHONE HYDROCHLORIDE 0.5 MG: 1 INJECTION, SOLUTION INTRAMUSCULAR; INTRAVENOUS; SUBCUTANEOUS at 14:37

## 2024-04-24 RX ADMIN — Medication 1 TABLET: at 17:56

## 2024-04-24 RX ADMIN — DEXAMETHASONE SODIUM PHOSPHATE 2 MG: 4 INJECTION, SOLUTION INTRAMUSCULAR; INTRAVENOUS at 05:30

## 2024-04-24 RX ADMIN — THIAMINE HYDROCHLORIDE 100 MG: 100 INJECTION, SOLUTION INTRAMUSCULAR; INTRAVENOUS at 21:58

## 2024-04-24 RX ADMIN — FOLIC ACID 1 MG: 1 TABLET ORAL at 17:56

## 2024-04-24 SDOH — HEALTH STABILITY: MENTAL HEALTH: HOW MANY STANDARD DRINKS CONTAINING ALCOHOL DO YOU HAVE ON A TYPICAL DAY?: 3 OR 4

## 2024-04-24 SDOH — ECONOMIC STABILITY: INCOME INSECURITY: IN THE LAST 12 MONTHS, WAS THERE A TIME WHEN YOU WERE NOT ABLE TO PAY THE MORTGAGE OR RENT ON TIME?: NO

## 2024-04-24 SDOH — ECONOMIC STABILITY: FOOD INSECURITY: WITHIN THE PAST 12 MONTHS, YOU WORRIED THAT YOUR FOOD WOULD RUN OUT BEFORE YOU GOT MONEY TO BUY MORE.: NEVER TRUE

## 2024-04-24 SDOH — ECONOMIC STABILITY: HOUSING INSECURITY
IN THE LAST 12 MONTHS, WAS THERE A TIME WHEN YOU DID NOT HAVE A STEADY PLACE TO SLEEP OR SLEPT IN A SHELTER (INCLUDING NOW)?: NO

## 2024-04-24 SDOH — ECONOMIC STABILITY: FOOD INSECURITY: WITHIN THE PAST 12 MONTHS, THE FOOD YOU BOUGHT JUST DIDN'T LAST AND YOU DIDN'T HAVE MONEY TO GET MORE.: NEVER TRUE

## 2024-04-24 SDOH — HEALTH STABILITY: MENTAL HEALTH: HOW OFTEN DO YOU HAVE A DRINK CONTAINING ALCOHOL?: 4 OR MORE TIMES A WEEK

## 2024-04-24 SDOH — HEALTH STABILITY: MENTAL HEALTH: CURRENT SMOKER: 0

## 2024-04-24 SDOH — ECONOMIC STABILITY: HOUSING INSECURITY: IN THE LAST 12 MONTHS, HOW MANY PLACES HAVE YOU LIVED?: 1

## 2024-04-24 ASSESSMENT — LIFESTYLE VARIABLES
ANXIETY: MILDLY ANXIOUS
HEADACHE, FULLNESS IN HEAD: NOT PRESENT
ANXIETY: MILDLY ANXIOUS
AGITATION: NORMAL ACTIVITY
TREMOR: NO TREMOR
PAROXYSMAL SWEATS: NO SWEAT VISIBLE
HEADACHE, FULLNESS IN HEAD: NOT PRESENT
NAUSEA AND VOMITING: NO NAUSEA AND NO VOMITING
TOTAL SCORE: 1
ANXIETY: MILDLY ANXIOUS
HEADACHE, FULLNESS IN HEAD: NOT PRESENT
TOTAL SCORE: 2
HEADACHE, FULLNESS IN HEAD: NOT PRESENT
AUDITORY DISTURBANCES: NOT PRESENT
AGITATION: NORMAL ACTIVITY
PAROXYSMAL SWEATS: BARELY PERCEPTIBLE SWEATING, PALMS MOIST
ORIENTATION AND CLOUDING OF SENSORIUM: ORIENTED AND CAN DO SERIAL ADDITIONS
PAROXYSMAL SWEATS: NO SWEAT VISIBLE
ORIENTATION AND CLOUDING OF SENSORIUM: ORIENTED AND CAN DO SERIAL ADDITIONS
AUDITORY DISTURBANCES: NOT PRESENT
ANXIETY: MILDLY ANXIOUS
AUDITORY DISTURBANCES: NOT PRESENT
AGITATION: NORMAL ACTIVITY
NAUSEA AND VOMITING: NO NAUSEA AND NO VOMITING
HEADACHE, FULLNESS IN HEAD: NOT PRESENT
ORIENTATION AND CLOUDING OF SENSORIUM: ORIENTED AND CAN DO SERIAL ADDITIONS
ORIENTATION AND CLOUDING OF SENSORIUM: ORIENTED AND CAN DO SERIAL ADDITIONS
HEADACHE, FULLNESS IN HEAD: NOT PRESENT
NAUSEA AND VOMITING: NO NAUSEA AND NO VOMITING
PAROXYSMAL SWEATS: NO SWEAT VISIBLE
AUDITORY DISTURBANCES: NOT PRESENT
AUDITORY DISTURBANCES: NOT PRESENT
TREMOR: NO TREMOR
AGITATION: NORMAL ACTIVITY
PAROXYSMAL SWEATS: BARELY PERCEPTIBLE SWEATING, PALMS MOIST
TOTAL SCORE: 1
ANXIETY: NO ANXIETY, AT EASE
AGITATION: NORMAL ACTIVITY
NAUSEA AND VOMITING: NO NAUSEA AND NO VOMITING
AUDITORY DISTURBANCES: NOT PRESENT
TOTAL SCORE: 2
ANXIETY: MILDLY ANXIOUS
AGITATION: NORMAL ACTIVITY
PAROXYSMAL SWEATS: NO SWEAT VISIBLE
NAUSEA AND VOMITING: NO NAUSEA AND NO VOMITING
AUDITORY DISTURBANCES: NOT PRESENT
VISUAL DISTURBANCES: NOT PRESENT
TREMOR: NOT VISIBLE, BUT CAN BE FELT FINGERTIP TO FINGERTIP
NAUSEA AND VOMITING: NO NAUSEA AND NO VOMITING
ORIENTATION AND CLOUDING OF SENSORIUM: ORIENTED AND CAN DO SERIAL ADDITIONS
PAROXYSMAL SWEATS: BARELY PERCEPTIBLE SWEATING, PALMS MOIST
HEADACHE, FULLNESS IN HEAD: NOT PRESENT
VISUAL DISTURBANCES: NOT PRESENT
ANXIETY: MILDLY ANXIOUS
VISUAL DISTURBANCES: NOT PRESENT
TOTAL SCORE: 1
TREMOR: NO TREMOR
HEADACHE, FULLNESS IN HEAD: NOT PRESENT
ORIENTATION AND CLOUDING OF SENSORIUM: ORIENTED AND CAN DO SERIAL ADDITIONS
VISUAL DISTURBANCES: NOT PRESENT
TREMOR: NO TREMOR
VISUAL DISTURBANCES: NOT PRESENT
TOTAL SCORE: 2
TREMOR: NO TREMOR
VISUAL DISTURBANCES: NOT PRESENT
VISUAL DISTURBANCES: NOT PRESENT
AUDITORY DISTURBANCES: NOT PRESENT
VISUAL DISTURBANCES: NOT PRESENT
AGITATION: NORMAL ACTIVITY
ORIENTATION AND CLOUDING OF SENSORIUM: ORIENTED AND CAN DO SERIAL ADDITIONS
NAUSEA AND VOMITING: NO NAUSEA AND NO VOMITING
NAUSEA AND VOMITING: NO NAUSEA AND NO VOMITING
TOTAL SCORE: 0
TREMOR: NO TREMOR
ORIENTATION AND CLOUDING OF SENSORIUM: ORIENTED AND CAN DO SERIAL ADDITIONS
TOTAL SCORE: 2
ANXIETY: MILDLY ANXIOUS
AGITATION: NORMAL ACTIVITY
TREMOR: NO TREMOR
PAROXYSMAL SWEATS: NO SWEAT VISIBLE

## 2024-04-24 ASSESSMENT — PAIN DESCRIPTION - LOCATION
LOCATION: NECK
LOCATION: NECK
LOCATION: BACK

## 2024-04-24 ASSESSMENT — PAIN - FUNCTIONAL ASSESSMENT
PAIN_FUNCTIONAL_ASSESSMENT: 0-10

## 2024-04-24 ASSESSMENT — PAIN SCALES - GENERAL
PAINLEVEL_OUTOF10: 4
PAINLEVEL_OUTOF10: 7
PAIN_LEVEL: 0
PAINLEVEL_OUTOF10: 0 - NO PAIN
PAINLEVEL_OUTOF10: 2
PAINLEVEL_OUTOF10: 0 - NO PAIN
PAINLEVEL_OUTOF10: 0 - NO PAIN
PAINLEVEL_OUTOF10: 4
PAINLEVEL_OUTOF10: 8

## 2024-04-24 ASSESSMENT — PAIN DESCRIPTION - ORIENTATION: ORIENTATION: POSTERIOR

## 2024-04-24 NOTE — ANESTHESIA POSTPROCEDURE EVALUATION
Patient: Katya Sandhu    Procedure Summary       Date: 04/24/24 Room / Location: [facility] OR 07 / Virtual [facility] OR    Anesthesia Start: 1351 Anesthesia Stop: 1546    Procedure: C6-7 anterior cervical discectomy and fusion, reduction of fracture Diagnosis:       Closed nondisplaced fracture of sixth cervical vertebra, unspecified fracture morphology, initial encounter (Multi)      (Closed nondisplaced fracture of sixth cervical vertebra, unspecified fracture morphology, initial encounter (Multi) [S12.501A])    Surgeons: Forrest Min MD Responsible Provider: Ely Viveros MD    Anesthesia Type: general ASA Status: 3            Anesthesia Type: general    Vitals Value Taken Time   /83 04/24/24 1547   Temp 36.4 04/24/24 1547   Pulse 67 04/24/24 1547   Resp 10 04/24/24 1547   SpO2 96 % 04/24/24 1545   Vitals shown include unfiled device data.    Anesthesia Post Evaluation    Patient location during evaluation: PACU  Patient participation: complete - patient cannot participate  Level of consciousness: sleepy but conscious  Pain score: 0  Pain management: adequate  Multimodal analgesia pain management approach  Airway patency: patent  Two or more strategies used to mitigate risk of obstructive sleep apnea  Cardiovascular status: acceptable and stable  Respiratory status: acceptable and face mask  Hydration status: acceptable  Postoperative Nausea and Vomiting: none        No notable events documented.
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
## 2024-04-24 NOTE — ANESTHESIA PROCEDURE NOTES
Airway  Date/Time: 4/24/2024 2:06 PM  Urgency: elective    Airway not difficult    Staffing  Performed: LORETTA   Authorized by: Ely Viveros MD    Performed by: LORETTA Tan  Patient location during procedure: OR    Indications and Patient Condition  Indications for airway management: anesthesia and airway protection  Spontaneous ventilation: present  Sedation level: deep  Preoxygenated: yes  Patient position: sniffing  MILS maintained throughout  Mask difficulty assessment: 1 - vent by mask    Final Airway Details  Final airway type: endotracheal airway      Successful airway: ETT  Cuffed: yes   Successful intubation technique: video laryngoscopy  Facilitating devices/methods: intubating stylet  Endotracheal tube insertion site: oral  Blade: Brian  Blade size: #3  ETT size (mm): 7.0  Cormack-Lehane Classification: grade I - full view of glottis  Placement verified by: chest auscultation and capnometry   Inital cuff pressure (cm H2O): 12  Cuff volume (mL): 6  Measured from: lips  ETT to lips (cm): 23  Number of attempts at approach: 1  Number of other approaches attempted: 0

## 2024-04-24 NOTE — CONSULTS
Department of Neurological Surgery  Consult Note      Referral Diagnosis:   1. Rhabdomyolysis        2. Closed head injury, initial encounter        3. Closed nondisplaced fracture of sixth cervical vertebra, unspecified fracture morphology, initial encounter (Multi)  Case Request Operating Room: C6-7 anterior cervical discectomy and fusion, reduction of fracture    Case Request Operating Room: C6-7 anterior cervical discectomy and fusion, reduction of fracture       Rhabdomyolysis [M62.82]  Closed head injury, initial encounter [S09.90XA]       History Of Present Illness  Katya Sandhu is a 74 y.o. female [place] [facility] [place] with continued neck and back pain. Reports indicate patient is a heavy alcohol drinker. MRI prior afternoon with irregularity of PLL, slightly perched facets, and severe canal stenosis with cord edema at C6-7. Neurosurgery placed on consult. OR reserved. On visit this am, patient is awake, alert, cooperative with  bedside. She denies LOC. Endorses full sensation throughout upper and lower extremities. Moving all extremities antigravity throughout. Patient denies headache, vision changes, shortness of breath, chest pain, numbness or tingling in arms or legs, bowel or bladder changes.          Past Medical History  Past Medical History:   Diagnosis Date    Brain lesion     Depression     ETOH abuse     HLD (hyperlipidemia)     Hypertension     Hypothyroidism        Surgical History  Past Surgical History:   Procedure Laterality Date    BRAIN SURGERY      CT ANGIO NECK  04/24/2022    CT NECK ANGIO W AND WO IV CONTRAST 4/24/2022 [facility]    CT HEAD ANGIO W AND WO IV CONTRAST  04/24/2022    CT HEAD ANGIO W AND WO IV CONTRAST 4/24/2022 [facility] CLINICAL [facility]    FOOT SURGERY      TONSILLECTOMY         Social History  Social History     Tobacco Use    Smoking status: Former     Types: Cigarettes    Smokeless  tobacco: Never   Substance Use Topics    Alcohol use: Yes     Alcohol/week: 3.0 standard drinks of alcohol     Types: 3 Glasses of wine per week     Comment: 1 glass of vodka sometines    Drug use: Never       Allergies  Penicillins  Medications Prior to Admission   Medication Sig Dispense Refill Last Dose    levothyroxine (Synthroid, Levoxyl) 50 mcg tablet Take 1 tablet (50 mcg) by mouth once daily.   4/22/2024    meloxicam (Mobic) 7.5 mg tablet Take 1 tablet (7.5 mg) by mouth once daily as needed for mild pain (1 - 3).   Past Week    spironolactone (Aldactone) 50 mg tablet Take 1 tablet (50 mg) by mouth once daily.   4/22/2024    SUMAtriptan (Imitrex) 25 mg tablet Take 1 tablet (25 mg) by mouth 1 time if needed for migraine. May repeat dose once in 2 hours if no relief.  Do not exceed 2 doses in 24 hours.   Past Month    venlafaxine XR (Effexor-XR) 75 mg 24 hr capsule Take 1 capsule (75 mg) by mouth once daily.   4/22/2024    zonisamide (Zonegran) 100 mg capsule Take 2 capsules (200 mg) by mouth once daily. 180 capsule 3 4/22/2024       Review of Systems  14/14 systems reviewed and negative other than what is listed in the history of present illness    Physical Exam    General: Well developed, awake/alert/oriented x3, no distress, alert and cooperative  Skin: Warm and dry, no lesions, no rashes  ENMT: Mucous membranes moist, no apparent injury, no lesions seen  Head/Neck: Neck Supple, no apparent injury  Respiratory/Thorax: Normal breath sounds with good chest expansion, thorax symmetric  Cardiovascular: No pitting edema, no JVD    Motor Strength: 5/5 Throughout all extremities    Muscle Bulk: Normal and symmetric in all extremities    Posture:   -- Cervical: Normal  -- Thoracic: Normal  -- Lumbar : Normal  Paraspinal muscle spasm/tenderness absent.     Sensation: intact to light touch    Last Recorded Vitals  Blood pressure 119/75, pulse 67, temperature 36.5 °C (97.7 °F), temperature source Temporal, resp. rate  "18, height 1.651 m (5' 5\"), weight 54.9 kg (121 lb 0.5 oz), SpO2 96%.    Relevant Results  Scheduled medications  dexAMETHasone, 2 mg, intravenous, q8h  diazePAM, 5 mg, intravenous, q6h  [Held by provider] folic acid, 1 mg, oral, Daily  [Held by provider] levothyroxine, 50 mcg, oral, Daily  lidocaine, 1 patch, transdermal, Daily  metoprolol, 5 mg, intravenous, q6h  [Held by provider] multivitamin with minerals, 1 tablet, oral, Daily  [Held by provider] polyethylene glycol, 17 g, oral, Daily  [Held by provider] spironolactone, 50 mg, oral, Daily  [Held by provider] thiamine, 100 mg, oral, Daily  [Held by provider] thiamine, 100 mg, intravenous, Daily  [Held by provider] venlafaxine XR, 75 mg, oral, Daily  zonisamide, 200 mg, oral, Daily      Continuous medications  lactated Ringer's, 100 mL/hr, Last Rate: 100 mL/hr (04/24/24 0602)      PRN medications  PRN medications: acetaminophen, HYDROmorphone, LORazepam **OR** LORazepam **OR** LORazepam, [Held by provider] oxyCODONE, [Held by provider] SUMAtriptan    Results for orders placed or performed during the hospital encounter of 04/23/24 (from the past 96 hour(s))   CBC and Auto Differential   Result Value Ref Range    WBC 12.3 (H) 4.4 - 11.3 x10*3/uL    nRBC 0.0 0.0 - 0.0 /100 WBCs    RBC 3.94 (L) 4.00 - 5.20 x10*6/uL    Hemoglobin 12.9 12.0 - 16.0 g/dL    Hematocrit 39.3 36.0 - 46.0 %     80 - 100 fL    MCH 32.7 26.0 - 34.0 pg    MCHC 32.8 32.0 - 36.0 g/dL    RDW 13.6 11.5 - 14.5 %    Platelets 272 150 - 450 x10*3/uL    Neutrophils % 83.0 40.0 - 80.0 %    Immature Granulocytes %, Automated 0.2 0.0 - 0.9 %    Lymphocytes % 7.3 13.0 - 44.0 %    Monocytes % 9.1 2.0 - 10.0 %    Eosinophils % 0.0 0.0 - 6.0 %    Basophils % 0.4 0.0 - 2.0 %    Neutrophils Absolute 10.18 (H) 1.60 - 5.50 x10*3/uL    Immature Granulocytes Absolute, Automated 0.03 0.00 - 0.50 x10*3/uL    Lymphocytes Absolute 0.89 0.80 - 3.00 x10*3/uL    Monocytes Absolute 1.12 (H) 0.05 - 0.80 x10*3/uL    " Eosinophils Absolute 0.00 0.00 - 0.40 x10*3/uL    Basophils Absolute 0.05 0.00 - 0.10 x10*3/uL   Comprehensive metabolic panel   Result Value Ref Range    Glucose 128 (H) 74 - 99 mg/dL    Sodium 141 136 - 145 mmol/L    Potassium 4.1 3.5 - 5.3 mmol/L    Chloride 106 98 - 107 mmol/L    Bicarbonate 21 21 - 32 mmol/L    Anion Gap 18 10 - 20 mmol/L    Urea Nitrogen 21 6 - 23 mg/dL    Creatinine 0.57 0.50 - 1.05 mg/dL    eGFR >90 >60 mL/min/1.73m*2    Calcium 9.0 8.6 - 10.3 mg/dL    Albumin 4.7 3.4 - 5.0 g/dL    Alkaline Phosphatase 70 33 - 136 U/L    Total Protein 6.4 6.4 - 8.2 g/dL    AST 46 (H) 9 - 39 U/L    Bilirubin, Total 0.8 0.0 - 1.2 mg/dL    ALT 33 7 - 45 U/L   Lactate   Result Value Ref Range    Lactate 2.4 (H) 0.4 - 2.0 mmol/L   Protime-INR   Result Value Ref Range    Protime 11.3 9.8 - 12.8 seconds    INR 1.0 0.9 - 1.1   Acute Toxicology Panel, Blood   Result Value Ref Range    Acetaminophen 17.2 10.0 - 30.0 ug/mL    Salicylate  <3 4 - 20 mg/dL    Alcohol 89 (H) <=10 mg/dL   Cardiac Enzymes - CPK   Result Value Ref Range    Creatine Kinase 893 (H) 0 - 215 U/L   ECG 12 lead   Result Value Ref Range    Ventricular Rate 80 BPM    Atrial Rate 80 BPM    NM Interval 176 ms    QRS Duration 138 ms    QT Interval 420 ms    QTC Calculation(Bazett) 484 ms    P Axis 79 degrees    R Axis 53 degrees    T Axis 77 degrees    QRS Count 13 beats    Q Onset 222 ms    P Onset 134 ms    P Offset 183 ms    T Offset 432 ms    QTC Fredericia 462 ms   Urinalysis with Reflex Culture and Microscopic   Result Value Ref Range    Color, Urine Yellow Straw, Yellow    Appearance, Urine Clear Clear    Specific Gravity, Urine >1.030 (N) 1.005 - 1.035    pH, Urine 6.0 5.0, 5.5, 6.0, 6.5, 7.0, 7.5, 8.0    Protein, Urine NEGATIVE NEGATIVE, 10 (TRACE) mg/dL    Glucose, Urine NEGATIVE NEGATIVE mg/dL    Blood, Urine NEGATIVE NEGATIVE    Ketones, Urine 10 (1+) (A) NEGATIVE mg/dL    Bilirubin, Urine NEGATIVE NEGATIVE    Urobilinogen, Urine NORM NORM  mg/dL    Nitrite, Urine NEGATIVE NEGATIVE    Leukocyte Esterase, Urine 75 Robert/µL (A) NEGATIVE   Microscopic Only, Urine   Result Value Ref Range    WBC, Urine 11-20 (A) 1-5, NONE /HPF    RBC, Urine 3-5 NONE, 1-2, 3-5 /HPF   Lactate   Result Value Ref Range    Lactate 2.8 (H) 0.4 - 2.0 mmol/L   Cardiac Enzymes - CPK   Result Value Ref Range    Creatine Kinase 865 (H) 0 - 215 U/L   Lactate   Result Value Ref Range    Lactate 1.9 0.4 - 2.0 mmol/L   Creatine Kinase   Result Value Ref Range    Creatine Kinase 841 (H) 0 - 215 U/L   Troponin I, High Sensitivity   Result Value Ref Range    Troponin I, High Sensitivity 9 0 - 13 ng/L   Ammonia   Result Value Ref Range    Ammonia 28 16 - 53 umol/L   Lavender Top   Result Value Ref Range    Extra Tube Hold for add-ons.    SST TOP   Result Value Ref Range    Extra Tube Hold for add-ons.    Creatine Kinase   Result Value Ref Range    Creatine Kinase 708 (H) 0 - 215 U/L   CBC   Result Value Ref Range    WBC 8.1 4.4 - 11.3 x10*3/uL    nRBC 0.0 0.0 - 0.0 /100 WBCs    RBC 3.65 (L) 4.00 - 5.20 x10*6/uL    Hemoglobin 12.2 12.0 - 16.0 g/dL    Hematocrit 35.9 (L) 36.0 - 46.0 %    MCV 98 80 - 100 fL    MCH 33.4 26.0 - 34.0 pg    MCHC 34.0 32.0 - 36.0 g/dL    RDW 13.4 11.5 - 14.5 %    Platelets 249 150 - 450 x10*3/uL   Basic metabolic panel   Result Value Ref Range    Glucose 153 (H) 74 - 99 mg/dL    Sodium 135 (L) 136 - 145 mmol/L    Potassium 4.0 3.5 - 5.3 mmol/L    Chloride 105 98 - 107 mmol/L    Bicarbonate 20 (L) 21 - 32 mmol/L    Anion Gap 14 10 - 20 mmol/L    Urea Nitrogen 19 6 - 23 mg/dL    Creatinine 0.46 (L) 0.50 - 1.05 mg/dL    eGFR >90 >60 mL/min/1.73m*2    Calcium 8.8 8.6 - 10.3 mg/dL   Creatine Kinase   Result Value Ref Range    Creatine Kinase 482 (H) 0 - 215 U/L   Phosphorus   Result Value Ref Range    Phosphorus 3.7 2.5 - 4.9 mg/dL   Magnesium   Result Value Ref Range    Magnesium 1.71 1.60 - 2.40 mg/dL         Intake/Output Summary (Last 24 hours) at 4/24/2024 0822  Last  data filed at 4/24/2024 0602  Gross per 24 hour   Intake 1226.67 ml   Output 650 ml   Net 576.67 ml        I have personally reviewed and interpreted the following imaging MR cervical spine wo IV contrast    Result Date: 4/23/2024  Interpreted By:  Lisa James, STUDY: MR CERVICAL SPINE WO IV CONTRAST;  4/23/2024 7:47 pm   INDICATION: Signs/Symptoms:Neck pain. S/P fall down flight of steps.   COMPARISON: CT cervical spine 04/23/2024   ACCESSION NUMBER(S): [id number]   ORDERING CLINICIAN: ANA LAURA KEY   TECHNIQUE: Sagittal T1, T2, STIR, axial T1 and axial T2 weighted images were acquired through the cervical spine.   FINDINGS: There is destruction of the ligamentum flavum at the level of C6-C7 best identified on sagittal T2 imaging, with widening of the interspinous space and mild focal C6-C7 kyphosis. There is facet joint edema and slightly perched facets noted bilaterally. There is anterior buckling of the cortex of the anterior superior C7 vertebral body and minimal prevertebral edema noted. There is minimal irregularity of the posterior longitudinal ligament at C6-C7. Intervertebral disc space edema is noted. Findings results in severe canal stenosis with cord deformity at C6-C7. There is slightly edematous appearance of the cord superior and inferior to this level with mild cord T2/STIR hyperintense signal dorsal to the C7 vertebral body. There is a small dorsal epidural collection which extends from C6-C7 through T1-T2 measuring up to 2 mm (AP). There is associated diffuse paraspinal edema which extends predominately from the level of C6-C7 cranially to the suboccipital region   Mild superior endplate edema noted at T1 without significant vertebral body height loss. Vertebral body and facet joint alignment is otherwise maintained. Intervertebral disc spaces are otherwise maintained.   C1-C2: The cervicomedullary junction appears unremarkable. There is no central canal stenosis.   C2-C3: There is no  significant central canal or neural foraminal stenosis.   C3-C4: Mild right foraminal narrowing, otherwise there is no significant central canal or neural foraminal stenosis.   C4-C5: Minimal canal and right foraminal narrowing present. The left foramen is patent.   C5-C6: Mild canal and moderate to severe right foraminal narrowing. No significant left foraminal narrowing.   C6-C7: Canal stenosis an epidural collection as well as additional findings as detailed above.   C7-T1: For is epidural collection. There is no significant central canal or neural foraminal stenosis.       There is traumatic kyphosis at C6-C7 with persistence and disruption of the ligamentum flavum. There is irregularity of the C6-C7 posterior longitudinal ligament with buckling and impaction anteriorly of the anterior longitudinal ligament and C7 vertebral body. Findings result in severe canal stenosis with secondary cord edema and trace epidural hematoma. Correlation for two-column spinal injury also recommended. Neuro surgical consult recommended.   Endplate edema at T1 concerning for a nondisplaced superior endplate fracture.   Extensive paraspinal edema with probable strain/sprain and mild prevertebral edema.   MACRO: Lisa James discussed the significance and urgency of this critical finding by telephone with  ANA LAURA KEY on 4/23/2024 at 9:58 pm.  (**-RCF-**) Findings:  See findings.   Signed by: Lisa James 4/23/2024 10:02 PM Dictation workstation:   [id number]    CT chest abdomen pelvis w IV contrast    Result Date: 4/23/2024  STUDY: CT Abdomen, and Pelvis with IV Contrast, CT Thoracic Spine and Lumbar Spine without IV Contrast; 4/23/2024 10:23 AM INDICATION: Back pain post fall. COMPARISON: 53/3/2022 XR abdomen. ACCESSION NUMBER(S): [id number], [id number], [id number] ORDERING CLINICIAN: CECI HUYNH TECHNIQUE: CT of the abdomen and pelvis was performed.  Contiguous axial images were obtained at 3 mm slice thickness  through the abdomen and pelvis. Coronal and sagittal reconstructions at 3 mm slice thickness were performed.  Omnipaque 350 65 mL was administered intravenously. Please note that spinal images were generated from the original CT abdomen and pelvis imaging. FINDINGS: CHEST: MEDIASTINUM: The heart is normal in size without pericardial effusion.  Central vascular structures opacify normally.  There is prominent fluid collection along the left aspect of the mediastinum measuring 14.4 x 9.8 x 3.8 cm suggesting large pericardial cyst. LUNGS/PLEURA: There is no pleural effusion or pneumothorax.  The airways are patent. There is mild atelectasis adjacent to the left pericardial fluid collection.  There is small cystic focus right middle lobe.  There are no pulmonary nodules or masses. LYMPH NODES: Thoracic lymph nodes are not enlarged. ABDOMEN:  LIVER: Small hypodensity adjacent to the gallbladder suggests hepatic cyst.  BILE DUCTS: No intrahepatic or extrahepatic biliary ductal dilatation.  GALLBLADDER: The gallbladder is present and demonstrates small dependent stones versus sludge. STOMACH: No abnormalities identified.  PANCREAS: No masses or ductal dilatation.  SPLEEN: No splenomegaly or focal splenic lesion.  ADRENAL GLANDS: No thickening or nodules.  KIDNEYS AND URETERS: Kidneys are normal in size and location.  No renal or ureteral calculi.  Tiny hypodensities within the kidney suggest renal cyst.  PELVIS:  BLADDER: There is a small amount of gas in the anterior aspect of the bladder. Please correlate for recent instrumentation.  REPRODUCTIVE ORGANS: No abnormalities identified.  BOWEL: No abnormalities identified.  The appendix is normal.  VESSELS: No abnormalities identified.  Abdominal aorta is normal in caliber.  PERITONEUM/RETROPERITONEUM/LYMPH NODES: No free fluid.  No pneumoperitoneum. No lymphadenopathy.  ABDOMINAL WALL: No abnormalities identified. SOFT TISSUES: No abnormalities identified.  BONES: No acute  fracture or aggressive osseous lesion. THORACIC SPINE: The alignment is anatomic.  There is no fracture or traumatic subluxation.  There is mild disc space narrowing.  There are small anterior marginal osteophytes in the lower thoracic spine extending T8-T10. The vertebral body heights are well maintained.  Disc spaces are preserved.  No significant central canal stenosis is demonstrated. The neural foramina are patent throughout.  The paravertebral soft tissues are within normal limits. LUMBAR SPINE: The alignment is anatomic.  There is no fracture or traumatic subluxation. There is superior endplate concavity and Schmorl's node deformity at L1 with mild anterior wedging which appears chronic.  There is disc space narrowing and mild endplate degenerative changes.  There is mild to moderate facet arthrosis. At L2-3, there is mild annular disc bulge and mild facet arthrosis. There is mild central canal stenosis with mild to moderate bilateral neural foraminal stenosis. At L3-4 there is broad-based disc bulge and moderate facet arthrosis. There is moderate to advanced central canal stenosis and advanced right with moderate to advanced left neural foraminal stenosis. At L4-5 there is broad-based disc bulge and central disc protrusion with facet arthrosis contributing to advanced high-grade central canal stenosis as well as advanced bilateral neural foraminal stenosis. At L5-S1 is broad-based disc bulge and central disc protrusion with facet arthrosis.  There is at least moderate central canal stenosis with moderate to advanced right and mild left neural foraminal stenosis. The paravertebral soft tissues are within normal limits.      Chest: No acute traumatic injury. Large cystic focus along the left aspect of the mediastinum suggesting pericardial cyst.  No other acute cardiopulmonary disease. Abdomen: No acute traumatic injury and no acute inflammatory changes. Gallstones versus sludge within the gallbladder. Pelvis:  No acute traumatic injury or inflammatory changes. Small amount gas in the bladder.  Please correlate for recent instrumentation. Thoracic spine: No acute fracture or malalignment. Lumbar spine: No acute fracture or malalignment.  Degenerative changes as described.  Findings are greater at L4-5.  Recommend follow-up MRI for further evaluation unless contraindicated. Signed by Saulo Castillo, DO    CT thoracic spine wo IV contrast    Result Date: 4/23/2024  STUDY: CT Abdomen, and Pelvis with IV Contrast, CT Thoracic Spine and Lumbar Spine without IV Contrast; 4/23/2024 10:23 AM INDICATION: Back pain post fall. COMPARISON: 53/3/2022 XR abdomen. ACCESSION NUMBER(S): [id number], [id number], [id number] ORDERING CLINICIAN: CECI HUYNH TECHNIQUE: CT of the abdomen and pelvis was performed.  Contiguous axial images were obtained at 3 mm slice thickness through the abdomen and pelvis. Coronal and sagittal reconstructions at 3 mm slice thickness were performed.  Omnipaque 350 65 mL was administered intravenously. Please note that spinal images were generated from the original CT abdomen and pelvis imaging. FINDINGS: CHEST: MEDIASTINUM: The heart is normal in size without pericardial effusion.  Central vascular structures opacify normally.  There is prominent fluid collection along the left aspect of the mediastinum measuring 14.4 x 9.8 x 3.8 cm suggesting large pericardial cyst. LUNGS/PLEURA: There is no pleural effusion or pneumothorax.  The airways are patent. There is mild atelectasis adjacent to the left pericardial fluid collection.  There is small cystic focus right middle lobe.  There are no pulmonary nodules or masses. LYMPH NODES: Thoracic lymph nodes are not enlarged. ABDOMEN:  LIVER: Small hypodensity adjacent to the gallbladder suggests hepatic cyst.  BILE DUCTS: No intrahepatic or extrahepatic biliary ductal dilatation.  GALLBLADDER: The gallbladder is present and demonstrates small dependent stones  versus sludge. STOMACH: No abnormalities identified.  PANCREAS: No masses or ductal dilatation.  SPLEEN: No splenomegaly or focal splenic lesion.  ADRENAL GLANDS: No thickening or nodules.  KIDNEYS AND URETERS: Kidneys are normal in size and location.  No renal or ureteral calculi.  Tiny hypodensities within the kidney suggest renal cyst.  PELVIS:  BLADDER: There is a small amount of gas in the anterior aspect of the bladder. Please correlate for recent instrumentation.  REPRODUCTIVE ORGANS: No abnormalities identified.  BOWEL: No abnormalities identified.  The appendix is normal.  VESSELS: No abnormalities identified.  Abdominal aorta is normal in caliber.  PERITONEUM/RETROPERITONEUM/LYMPH NODES: No free fluid.  No pneumoperitoneum. No lymphadenopathy.  ABDOMINAL WALL: No abnormalities identified. SOFT TISSUES: No abnormalities identified.  BONES: No acute fracture or aggressive osseous lesion. THORACIC SPINE: The alignment is anatomic.  There is no fracture or traumatic subluxation.  There is mild disc space narrowing.  There are small anterior marginal osteophytes in the lower thoracic spine extending T8-T10. The vertebral body heights are well maintained.  Disc spaces are preserved.  No significant central canal stenosis is demonstrated. The neural foramina are patent throughout.  The paravertebral soft tissues are within normal limits. LUMBAR SPINE: The alignment is anatomic.  There is no fracture or traumatic subluxation. There is superior endplate concavity and Schmorl's node deformity at L1 with mild anterior wedging which appears chronic.  There is disc space narrowing and mild endplate degenerative changes.  There is mild to moderate facet arthrosis. At L2-3, there is mild annular disc bulge and mild facet arthrosis. There is mild central canal stenosis with mild to moderate bilateral neural foraminal stenosis. At L3-4 there is broad-based disc bulge and moderate facet arthrosis. There is moderate to  advanced central canal stenosis and advanced right with moderate to advanced left neural foraminal stenosis. At L4-5 there is broad-based disc bulge and central disc protrusion with facet arthrosis contributing to advanced high-grade central canal stenosis as well as advanced bilateral neural foraminal stenosis. At L5-S1 is broad-based disc bulge and central disc protrusion with facet arthrosis.  There is at least moderate central canal stenosis with moderate to advanced right and mild left neural foraminal stenosis. The paravertebral soft tissues are within normal limits.      Chest: No acute traumatic injury. Large cystic focus along the left aspect of the mediastinum suggesting pericardial cyst.  No other acute cardiopulmonary disease. Abdomen: No acute traumatic injury and no acute inflammatory changes. Gallstones versus sludge within the gallbladder. Pelvis: No acute traumatic injury or inflammatory changes. Small amount gas in the bladder.  Please correlate for recent instrumentation. Thoracic spine: No acute fracture or malalignment. Lumbar spine: No acute fracture or malalignment.  Degenerative changes as described.  Findings are greater at L4-5.  Recommend follow-up MRI for further evaluation unless contraindicated. Signed by Saulo Castillo, DO    CT lumbar spine wo IV contrast    Result Date: 4/23/2024  STUDY: CT Abdomen, and Pelvis with IV Contrast, CT Thoracic Spine and Lumbar Spine without IV Contrast; 4/23/2024 10:23 AM INDICATION: Back pain post fall. COMPARISON: 53/3/2022 XR abdomen. ACCESSION NUMBER(S): [id number], [id number], [id number] ORDERING CLINICIAN: CECI HUYNH TECHNIQUE: CT of the abdomen and pelvis was performed.  Contiguous axial images were obtained at 3 mm slice thickness through the abdomen and pelvis. Coronal and sagittal reconstructions at 3 mm slice thickness were performed.  Omnipaque 350 65 mL was administered intravenously. Please note that spinal images were generated  from the original CT abdomen and pelvis imaging. FINDINGS: CHEST: MEDIASTINUM: The heart is normal in size without pericardial effusion.  Central vascular structures opacify normally.  There is prominent fluid collection along the left aspect of the mediastinum measuring 14.4 x 9.8 x 3.8 cm suggesting large pericardial cyst. LUNGS/PLEURA: There is no pleural effusion or pneumothorax.  The airways are patent. There is mild atelectasis adjacent to the left pericardial fluid collection.  There is small cystic focus right middle lobe.  There are no pulmonary nodules or masses. LYMPH NODES: Thoracic lymph nodes are not enlarged. ABDOMEN:  LIVER: Small hypodensity adjacent to the gallbladder suggests hepatic cyst.  BILE DUCTS: No intrahepatic or extrahepatic biliary ductal dilatation.  GALLBLADDER: The gallbladder is present and demonstrates small dependent stones versus sludge. STOMACH: No abnormalities identified.  PANCREAS: No masses or ductal dilatation.  SPLEEN: No splenomegaly or focal splenic lesion.  ADRENAL GLANDS: No thickening or nodules.  KIDNEYS AND URETERS: Kidneys are normal in size and location.  No renal or ureteral calculi.  Tiny hypodensities within the kidney suggest renal cyst.  PELVIS:  BLADDER: There is a small amount of gas in the anterior aspect of the bladder. Please correlate for recent instrumentation.  REPRODUCTIVE ORGANS: No abnormalities identified.  BOWEL: No abnormalities identified.  The appendix is normal.  VESSELS: No abnormalities identified.  Abdominal aorta is normal in caliber.  PERITONEUM/RETROPERITONEUM/LYMPH NODES: No free fluid.  No pneumoperitoneum. No lymphadenopathy.  ABDOMINAL WALL: No abnormalities identified. SOFT TISSUES: No abnormalities identified.  BONES: No acute fracture or aggressive osseous lesion. THORACIC SPINE: The alignment is anatomic.  There is no fracture or traumatic subluxation.  There is mild disc space narrowing.  There are small anterior marginal  osteophytes in the lower thoracic spine extending T8-T10. The vertebral body heights are well maintained.  Disc spaces are preserved.  No significant central canal stenosis is demonstrated. The neural foramina are patent throughout.  The paravertebral soft tissues are within normal limits. LUMBAR SPINE: The alignment is anatomic.  There is no fracture or traumatic subluxation. There is superior endplate concavity and Schmorl's node deformity at L1 with mild anterior wedging which appears chronic.  There is disc space narrowing and mild endplate degenerative changes.  There is mild to moderate facet arthrosis. At L2-3, there is mild annular disc bulge and mild facet arthrosis. There is mild central canal stenosis with mild to moderate bilateral neural foraminal stenosis. At L3-4 there is broad-based disc bulge and moderate facet arthrosis. There is moderate to advanced central canal stenosis and advanced right with moderate to advanced left neural foraminal stenosis. At L4-5 there is broad-based disc bulge and central disc protrusion with facet arthrosis contributing to advanced high-grade central canal stenosis as well as advanced bilateral neural foraminal stenosis. At L5-S1 is broad-based disc bulge and central disc protrusion with facet arthrosis.  There is at least moderate central canal stenosis with moderate to advanced right and mild left neural foraminal stenosis. The paravertebral soft tissues are within normal limits.      Chest: No acute traumatic injury. Large cystic focus along the left aspect of the mediastinum suggesting pericardial cyst.  No other acute cardiopulmonary disease. Abdomen: No acute traumatic injury and no acute inflammatory changes. Gallstones versus sludge within the gallbladder. Pelvis: No acute traumatic injury or inflammatory changes. Small amount gas in the bladder.  Please correlate for recent instrumentation. Thoracic spine: No acute fracture or malalignment. Lumbar spine: No  acute fracture or malalignment.  Degenerative changes as described.  Findings are greater at L4-5.  Recommend follow-up MRI for further evaluation unless contraindicated. Signed by Saulo Castillo DO    ECG 12 lead    Result Date: 4/23/2024  Normal sinus rhythm Right bundle branch block Abnormal ECG When compared with ECG of 01-MAY-2022 01:32, QRS voltage has increased Nonspecific T wave abnormality no longer evident in Inferior leads T wave amplitude has increased in Anterolateral leads    CT head wo IV contrast    Result Date: 4/23/2024  Interpreted By:  Guerrero Zurita, STUDY: CT HEAD WO IV CONTRAST; CT CERVICAL SPINE WO IV CONTRAST;  4/23/2024 10:20 am   INDICATION: Signs/Symptoms:Fall head injury.   COMPARISON: CT brain and cervical spine both from 28 December 2023   ACCESSION NUMBER(S): [id number]; [id number]   ORDERING CLINICIAN: CECI HUYNH   TECHNIQUE: CT of the brain from the skull vertex to the skull base, without intravenous contrast   CT cervical spine from the craniocervical junction through the cervicothoracic junction without IV contrast, including sagittal and coronal reformatted images   FINDINGS: CT BRAIN   TRAUMA-RELATED   Brain Injury (BIG) guidelines CT values:   Skull fracture: No SDH (subdural hematoma): None detected EDH (epidural hemtoma): None detected IPH (intraparenchymal hemorrhage): None detected SAH (subarachnoid hemorrhage): None detected IVH (intraventricular hemorrhage): None detected   Reference: Bert CAMPOS, John Paul RS, Madelin DENSON, et al. The BIG (brain injury guidelines) project: defining the management of traumatic brain injury by acute care surgeons. J Trauma Acute Care Surg. 2014;76:857b950.   OTHER   ACUTE INTRACRANIAL MASS EFFECT:  Negative   CT EVIDENCE OF ACUTE / SUBACUTE TERRITORIAL ISCHEMIA:  Negative   VENTRICLES:  Normal caliber and configuration   OTHER BRAIN FINDINGS:  No additional findings to note   INCLUDED PARANASAL SINUSES: All clear   INCLUDED MASTOID AIR  CELLS: All clear   SKULL:  No lytic or blastic lesion   EXTRACRANIAL SOFT TISSUES:  Large right paramidline scalp hematoma. The skin surface over the area of hematoma is cut out of the field of view. Field-of-view contains no subcutaneous gas or subcutaneous radiopaque foreign body   -------   CT CERVICAL SPINE   COUNTING REFERENCE: Craniocervical junction   CRANIOCERVICAL JUNCTION:  Intact   CERVICAL ALIGNMENT:  Anatomic; no acute traumatic alignment abnormality such as subluxation   ACUTE FRACTURE: Negative   AGGRESSIVE OSSEOUS LESION: Negative   BONY CANAL AND FORAMINA:  No significant interval change from prior. This exam was interpreted on a time sensitive basis in the context of trauma, not for radiculopathy as an outpatient   PARASPINAL SOFT TISSUES:  No large acute hematoma or other acute posttraumatic finding   OTHER INCLUDED STRUCTURES:  No acute or contributory soft tissue abnormality in the other cervical and upper thoracic soft tissues       LARGE SCALP HEMATOMA. SKIN SURFACE OVER THE SCALP HEMATOMA IS NOT INCLUDED IN THE FIELD OF VIEW. FIELD-OF-VIEW DOES NOT INCLUDE ANY SUBCUTANEOUS GAS OR SUBCUTANEOUS RADIOPAQUE FOREIGN BODY   NO ACUTE INTRACRANIAL PROCESS. SKULL INTACT   NO ACUTE FRACTURE OR SUBLUXATION IN THE CERVICAL SPINE   THIS REPORT SERVES AS THE DIAGNOSTIC INTERPRETATION FOR TWO EXAMS PERFORMED CONCURRENTLY: CT BRAIN WITHOUT IV CONTRAST AND CT CERVICAL SPINE WITHOUT IV CONTRAST   MACRO: None   Signed by: Guerrero Zurita 4/23/2024 10:42 AM Dictation workstation:   [id number]    CT cervical spine wo IV contrast    Result Date: 4/23/2024  Interpreted By:  Guerrero Zurita, STUDY: CT HEAD WO IV CONTRAST; CT CERVICAL SPINE WO IV CONTRAST;  4/23/2024 10:20 am   INDICATION: Signs/Symptoms:Fall head injury.   COMPARISON: CT brain and cervical spine both from 28 December 2023   ACCESSION NUMBER(S): [id number]; [id number]   ORDERING CLINICIAN: CECI HUYNH   TECHNIQUE: CT of the brain from the skull  vertex to the skull base, without intravenous contrast   CT cervical spine from the craniocervical junction through the cervicothoracic junction without IV contrast, including sagittal and coronal reformatted images   FINDINGS: CT BRAIN   TRAUMA-RELATED   Brain Injury (BIG) guidelines CT values:   Skull fracture: No SDH (subdural hematoma): None detected EDH (epidural hemtoma): None detected IPH (intraparenchymal hemorrhage): None detected SAH (subarachnoid hemorrhage): None detected IVH (intraventricular hemorrhage): None detected   Reference: Bert CAMPOS, John Paul RS, Madelin DENSON, et al. The BIG (brain injury guidelines) project: defining the management of traumatic brain injury by acute care surgeons. J Trauma Acute Care Surg. 2014;76:397x768.   OTHER   ACUTE INTRACRANIAL MASS EFFECT:  Negative   CT EVIDENCE OF ACUTE / SUBACUTE TERRITORIAL ISCHEMIA:  Negative   VENTRICLES:  Normal caliber and configuration   OTHER BRAIN FINDINGS:  No additional findings to note   INCLUDED PARANASAL SINUSES: All clear   INCLUDED MASTOID AIR CELLS: All clear   SKULL:  No lytic or blastic lesion   EXTRACRANIAL SOFT TISSUES:  Large right paramidline scalp hematoma. The skin surface over the area of hematoma is cut out of the field of view. Field-of-view contains no subcutaneous gas or subcutaneous radiopaque foreign body   -------   CT CERVICAL SPINE   COUNTING REFERENCE: Craniocervical junction   CRANIOCERVICAL JUNCTION:  Intact   CERVICAL ALIGNMENT:  Anatomic; no acute traumatic alignment abnormality such as subluxation   ACUTE FRACTURE: Negative   AGGRESSIVE OSSEOUS LESION: Negative   BONY CANAL AND FORAMINA:  No significant interval change from prior. This exam was interpreted on a time sensitive basis in the context of trauma, not for radiculopathy as an outpatient   PARASPINAL SOFT TISSUES:  No large acute hematoma or other acute posttraumatic finding   OTHER INCLUDED STRUCTURES:  No acute or contributory soft tissue abnormality in the  other cervical and upper thoracic soft tissues       LARGE SCALP HEMATOMA. SKIN SURFACE OVER THE SCALP HEMATOMA IS NOT INCLUDED IN THE FIELD OF VIEW. FIELD-OF-VIEW DOES NOT INCLUDE ANY SUBCUTANEOUS GAS OR SUBCUTANEOUS RADIOPAQUE FOREIGN BODY   NO ACUTE INTRACRANIAL PROCESS. SKULL INTACT   NO ACUTE FRACTURE OR SUBLUXATION IN THE CERVICAL SPINE   THIS REPORT SERVES AS THE DIAGNOSTIC INTERPRETATION FOR TWO EXAMS PERFORMED CONCURRENTLY: CT BRAIN WITHOUT IV CONTRAST AND CT CERVICAL SPINE WITHOUT IV CONTRAST   MACRO: None   Signed by: Guerrero Zurita 4/23/2024 10:42 AM Dictation workstation:   [id number]    XR elbow right 3+ views    Result Date: 4/23/2024  STUDY: Elbow Radiographs; 4/23/2024 9:25 AM. INDICATION: Fall injury. COMPARISON: None Available. ACCESSION NUMBER(S): [id number] ORDERING CLINICIAN: CECI HUYNH TECHNIQUE:  Five views of the right elbow. FINDINGS:  There is no displaced fracture.  Underlying osteopenia.  Mild to moderate degenerative overhanging osteophytes along the radial head. Changes noted.  The alignment is anatomic.  No soft tissue abnormality is seen.  There is no joint effusion.    No acute osseous abnormality. Signed by Teo Ponce MD       Assessment and Plan     Assessment/Plan     Principal Problem:    Rhabdomyolysis  Active Problems:    ETOH abuse    Fall    Closed head injury    Elevated lactic acid level    Neck pain    Alcohol withdrawal seizure, with unspecified complication (Multi)    Closed nondisplaced fracture of sixth cervical vertebra (Multi)    Acute spine injury with cord edema  Unable to get up from laying prior evening following fall.   Plan for surgical intervention with Dr. Forrest Min  NPO, hold thinners  Appreciate medical mgmt by IM team

## 2024-04-24 NOTE — ANESTHESIA PROCEDURE NOTES
Peripheral IV  Date/Time: 4/24/2024 2:33 PM  Inserted by: LORETTA Tan    Placement  Needle size: 18 G  Laterality: left  Location: hand  Local anesthetic: none  Site prep: alcohol  Technique: anatomical landmarks  Attempts: 1

## 2024-04-24 NOTE — ANESTHESIA PREPROCEDURE EVALUATION
Patient: Katya Sandhu    Procedure Information       Date/Time: 04/24/24 1310    Procedure: C6-7 anterior cervical discectomy and fusion, reduction of fracture    Location: [facility] / [facility]    Surgeons: Forrest Min MD            Relevant Problems   Cardiac   (+) HTN (hypertension)   (+) Myocardial infarction, inferolateral wall (Multi)      Neuro   (+) Alcohol withdrawal seizure, with unspecified complication (Multi)   (+) Depression   (+) Seizure disorder (Multi)      Endocrine   (+) Hypothyroidism      Musculoskeletal   (+) Localized, primary osteoarthritis       Clinical information reviewed:   Tobacco  Allergies  Meds  Problems  Med Hx  Surg Hx   Fam Hx  Soc   Hx        NPO Detail:  No data recorded     Physical Exam    Airway  Mallampati: II  TM distance: >3 FB  Neck ROM: full     Cardiovascular - normal exam  Rhythm: regular  Rate: normal     Dental - normal exam     Pulmonary - normal exam  Breath sounds clear to auscultation     Abdominal   (+) obese  Abdomen: soft  Bowel sounds: normal           Anesthesia Plan    History of general anesthesia?: yes  History of complications of general anesthesia?: no    ASA 3     general     The patient is not a current smoker.  Patient was previously instructed to abstain from smoking on day of procedure.  Patient did not smoke on day of procedure.  Education provided regarding risk of obstructive sleep apnea.  intravenous induction   Postoperative administration of opioids is intended.  Anesthetic plan and risks discussed with patient.  Use of blood products discussed with patient who.    Plan discussed with CAA and CRNA.

## 2024-04-24 NOTE — OP NOTE
C6-7 anterior cervical discectomy and fusion, reduction of fracture Operative Note     Date: 2024  OR Location: [facility]    Name: Katya Sandhu, : 1950, Age: 74 y.o., MRN: [id number], Sex: female    Diagnosis  Pre-op Diagnosis     * Closed nondisplaced fracture of sixth cervical vertebra, unspecified fracture morphology, initial encounter (Multi) [S12.501A] Post-op Diagnosis     * Closed nondisplaced fracture of sixth cervical vertebra, unspecified fracture morphology, initial encounter (Multi) [S12.501A]     Procedures  Anterior cervical C6-7 discectomy and fusion with plating  Reduction of fracture    Surgeons      * Forrest Min - Primary    Resident/Fellow/Other Assistant:  Surgeons and Role:     * Ron Palm MD - Assisting    Procedure Summary  Anesthesia: General  ASA: III  Anesthesia Staff: Anesthesiologist: Ely Viveros MD  C-AA: LORETTA Tan  Estimated Blood Loss: 20 cc  Intra-op Medications:   Administrations occurring from 1310 to 1700 on 24:   Medication Name Total Dose   lidocaine-epinephrine (Xylocaine W/EPI) 1 %-1:100,000 injection 3 mL   dexAMETHasone (Decadron) injection 2 mg Cannot be calculated              Anesthesia Record               Intraprocedure I/O Totals       None           Specimen: No specimens collected     Staff:   Circulator: Maria Fernanda Barnett RN; Ryder Carrillo RN; Cierra Trevizo RN  Scrub Person: Raiba Scherer RN         Drains and/or Catheters:   Closed/Suction Drain Right Neck 10 Fr. (Active)       Urethral Catheter Straight-tip 16 Fr. (Active)   Site Assessment Clean;Skin intact 24   Collection Container Standard drainage bag 24   Securement Method Securing device (Describe) 24   Reason for Continuing Urinary Catheterization surgical procedures: urological/gynecological, pelvic oncology, anal, prolonged surgical procedure 24 0815   Output (mL) 650 mL 24 0600   $ Urethral Catheter Charge  Indwelling cath 04/23/24 2200       Implants:  Implants       Type Name Action Serial No.      Screw PUTTY ATTRAX, 1CC US - [id number] Implanted      Cage TRIAD CR ALLOGRAFT. [id number] Implanted [id number]     Plate 1.9X16MM PLATE Implanted      Spinal Hardware SCREW, ACP, SELF DRILL, 3.5 X 17MM, VARIABLE - [id number] Implanted                     Informed Consent:  The risks, benefits, complications, and alternatives were discussed with the patient. I have explained the surgical procedure in detail with expected duration and extent of recovery along risks of surgery that include, but is not limited to bleeding, infection, blood vessel injury or damage, loss of sensation, loss of bladder, bowel or sexual function, nerve injury/damage resulting in weakness/paralysis, malunion, nonunion, CSF leak, brachial plexus injury, peripheral vision blindness, failure of implants/fusion, failure to relieve symptoms, recurrent disease, adjacent segment disease, need to reoperate for any reason and general anesthesia reaction such as stroke, coma, heart attack, delirium, confusion, death as well as worsening of preexisted medical conditions.     I clearly emphasized that while the goal of surgery is to decompress the spinal cord so as to ARREST the progression of neurological deficits - preexisting deficits may or may not improve after surgery. We discussed that many patients do clinically improve in functional and neurological outcomes following decompression of the spinal elements in patients but the extent of which is variable and depends on the severity of pain, numbness, tingling, or weakness. With improvement seen of those symptoms in that order. We did discuss the goal of surgery to alleviate pain first and foremost and hope for recovery of all neurologic function with time.     All questions were answered and the patient was amenable to proceed.     INDICATIONS FOR THE PROCEDURE: Katya Sandhu is an 74 y.o.  female who is having surgery for unstable injury at the level of C6-C7 with cord compression after a fall.    DESCRIPTION OF THE OPERATION:   The patient was brought to the operating room theater. A verbal Huddle was performed confirming the patient by name, date of birth, medical record number, site of surgery. After all team members were in agreement, they underwent anesthesia induction without complication. Two large bore IVs were placed as well as endotracheal tube. Perioperative antibiotic administration was confirmed. A horizontal incision from the midline of the trachea over to the medial aspect of the sternocleidomastoid was marked at approximately the C6-C7 disc spaces and this was confirmed with lateral fluoroscopy.  With appropriate positioning of the patient the patient's focal kyphosis at the C6-C7 was corrected.  Next, we prepped and draped the neck in a sterile fashion and infiltrated the skin with lidocaine with epinephrine.    We then began the procedure by opening the skin with a 15 blade and using combination of Bovie electrocautery and blunt dissection, we exposed the platysma muscle and this was bisected in a horizontal fashion. Staying along the medial aspect of the sternocleidomastoid we used blunt dissection to retract the carotid sheath medially, omohyoid muscle inferiorly, and trachea/esophagus medially exposing the precervical fascia. We then exposed the anterior aspect of the cervical spine with blunt dissection. Using Bovie electrocautery, we reflected the longus colli muscles over the our disc space and used lateral fluoroscopy to confirm our level of interest. We then placed our self-retaining retractor in and Sheffield pins in and placed the disc space under distraction. We then performed an annulotomy with an 11 blade followed by Kerrison rongeurs and curettes. We performed a complete total discectomy under microscopic assistance. After completion of the discectomy, we removed the  posterior longitudinal ligament using microsurgical technique with a nerve hook, curettes, and kerrison rongeurs. We then burred out the uncinate processes bilaterally and performed foraminotomies with a Kerrison rongeur. This was confirmed with a large blunt nerve hook bilaterally.    FloSeal and bipolar cautery were next used to achieve hemostasis. Contouring and arthrodesis of the disc space was then finally performed with a high-speed drill. Anterior osteophytes were removed, and a trial was then inserted in the disc space and a 7mm Nuvasive interbody was placed without complication under fluoroscopic guidance.    We then used an anterior plate and secured this with 17mm screws to fixate the spine in place. This was final tightened and secured to the vertebral bodies. Copious amounts of irrigation were used and FloSeal and Bipolar caudery for hemostasis. Final x-rays confirmed accurate placement of all hardware across the C6-C7 disc spaces. The platysma and dermal layers were then approximated with 3-0 Vicryl sutures. Skin was approximated with a Biosyn stitch in a subcuticular fashion followed by Dermabond on the skin.  A drain that was used in the subplatysmal layer was secured.  The patient was then extubated and returned to PACU in stable condition.    Disposition: PACU - hemodynamically stable.    Condition: stable    Attending Attestation: I was present and scrubbed for the key portions of the procedure.    Forrest Min MD, [facility]  Spine , [facility] [place]  Guerrero Levine and Kassandra Levine Chair in Spinal Neurosurgery  Neurosurgery , [facility] and [facility]  Complex Spine Surgery Fellowship Director   of Neurological Surgery  [employer] [facility] [employer]  Office: [phone]  Fax: [phone]

## 2024-04-24 NOTE — PROGRESS NOTES
"   04/24/24 1548   Discharge Planning   Living Arrangements Spouse/significant other   Support Systems Spouse/significant other   Type of Residence Private residence   Number of Stairs Within Residence 13   Who is requesting discharge planning? Provider   Home or Post Acute Services Post acute facilities (Rehab/SNF/etc)   Patient expects to be discharged to: [facility]     1130 Met with the pt and her spouse at bedside. The pt lives with her  in a multilevel home and was independent in her own care needs prior to admission. She has 13 steps within the house but can stay in the spare room on the main floor if needed. There is a bathroom on that floor as well. She does admit to drinking alcoholic beverages daily and says she had her  usually split a bottle but was not clear about the size of the bottle or if it was wine, beer or liquor. Her  says she is a \"professional\" at drinking. Asked if she would want a list of resources for alcohol recovery. She is receptive to the info but continued on to say that she used to work in an alcohol rehab program in [place] and she likely will not pursue rehab. Pt is to go to surgery this afternoon.   "

## 2024-04-24 NOTE — NURSING NOTE
0700:Assumed care of pt. At this time.    0755: Neurosurgery at the bedside at this time to explain the extent of the damage done from the fall  yesterday. Plan for the pt. To have surgery today.  at the bedside and had all questions addressed. Pt. Appears to be much more comfortable at this time than she was yesterday.    0810: 0800 CIWA completed at this time as neurosurgery was at the bedside during the 8AM assessment.    1240: Pt. Taken to surgery at this time.  called as he was not at the bedside at this time.

## 2024-04-24 NOTE — H&P
Internal Medicine History and Physical    PATIENT NAME:  Katya Sandhu    MRN: [id number]  DATE of SERVICE: April 24, 2024    Primary Care Physician: Josselin Abdi MD          Chief Complaint: Fall    HPI:  This is a 74 y.o. female with past medical history of hypertension, hyperlipidemia, hypothyroidism, alcohol abuse, who presents with  fall, patient fell on stairs, patient complains of upper back pain, pain is sharp in nature, 10/10 in severity, patient denies fever, chills, cough, palpitations, lightheadedness.   In the emergency room patient was found to have elevated CK and lactic acid, patient was on IV fluids and admitted for further evaluation and treatment,    After getting to the floor patient had MRI of the C-spine which showed:   There is traumatic kyphosis at C6-C7 with persistence and disruption  of the ligamentum flavum. There is irregularity of the C6-C7  posterior longitudinal ligament with buckling and impaction  anteriorly of the anterior longitudinal ligament and C7 vertebral  body. Findings result in severe canal stenosis with secondary cord  edema and trace epidural hematoma. Correlation for two-column spinal  injury also recommended. Neuro surgical consult recommended.      Endplate edema at T1 concerning for a nondisplaced superior endplate  fracture.      Extensive paraspinal edema with probable strain/sprain and mild  prevertebral edema.          Past Medical History:  Past Medical History:   Diagnosis Date    Brain lesion     Depression     ETOH abuse     HLD (hyperlipidemia)     Hypertension     Hypothyroidism        Past Surgical History:  Past Surgical History:   Procedure Laterality Date    BRAIN SURGERY      CT ANGIO NECK  04/24/2022    CT NECK ANGIO W AND WO IV CONTRAST 4/24/2022 [facility]    CT HEAD ANGIO W AND WO IV CONTRAST  04/24/2022    CT HEAD ANGIO W AND WO IV CONTRAST 4/24/2022 [facility] CL[facility]    FOOT SURGERY      TONSILLECTOMY         Family  History:  Family History   Problem Relation Name Age of Onset    Other (HTN) Mother      Hypertension Father      Alzheimer's disease Father      Aneurysm Father      Hypertension Sister      Hypertension Brother         Social History:    Social History     Socioeconomic History    Marital status:      Spouse name: Not on file    Number of children: Not on file    Years of education: Not on file    Highest education level: Not on file   Occupational History    Not on file   Tobacco Use    Smoking status: Former     Types: Cigarettes    Smokeless tobacco: Never   Substance and Sexual Activity    Alcohol use: Yes     Alcohol/week: 3.0 standard drinks of alcohol     Types: 3 Glasses of wine per week     Comment: 1 glass of vodka sometines    Drug use: Never    Sexual activity: Not on file   Other Topics Concern    Not on file   Social History Narrative    Not on file     Social Determinants of Health     Financial Resource Strain: Low Risk  (4/23/2024)    Overall Financial Resource Strain (CARDIA)     Difficulty of Paying Living Expenses: Not very hard   Food Insecurity: No Food Insecurity (6/26/2021)    Received from [facility]    Hunger Vital Sign     Worried About Running Out of Food in the Last Year: Never true     Ran Out of Food in the Last Year: Never true   Transportation Needs: No Transportation Needs (4/23/2024)    PRAPARE - Transportation     Lack of Transportation (Medical): No     Lack of Transportation (Non-Medical): No   Physical Activity: Insufficiently Active (6/26/2021)    Received from [facility]    Exercise Vital Sign     Days of Exercise per Week: 4 days     Minutes of Exercise per Session: 10 min   Stress: No Stress Concern Present (6/26/2021)    Received from [facility]    [place] of Occupational [employer] - Occupational Stress Questionnaire     Feeling of Stress : Only a little   Social Connections: Socially Integrated (6/26/2021)    Received from [facility]    Social Connection and Isolation Panel [NHANES]     Frequency of Communication with Friends and Family: More than three times a week     Frequency of Social Gatherings with Friends and Family: Twice a week     Attends Gnosticist Services: More than 4 times per year     Active Member of Clubs or Organizations: Yes     Attends Club or Organization Meetings: More than 4 times per year     Marital Status:    Intimate Partner Violence: Not on file   Housing Stability: Low Risk  (4/23/2024)    Housing Stability Vital Sign     Unable to Pay for Housing in the Last Year: No     Number of Places Lived in the Last Year: 1     Unstable Housing in the Last Year: No         Prior to Admission Medications:  Medications Prior to Admission   Medication Sig Dispense Refill Last Dose    levothyroxine (Synthroid, Levoxyl) 50 mcg tablet Take 1 tablet (50 mcg) by mouth once daily.   4/22/2024    meloxicam (Mobic) 7.5 mg tablet Take 1 tablet (7.5 mg) by mouth once daily as needed for mild pain (1 - 3).   Past Week    spironolactone (Aldactone) 50 mg tablet Take 1 tablet (50 mg) by mouth once daily.   4/22/2024    SUMAtriptan (Imitrex) 25 mg tablet Take 1 tablet (25 mg) by mouth 1 time if needed for migraine. May repeat dose once in 2 hours if no relief.  Do not exceed 2 doses in 24 hours.   Past Month    venlafaxine XR (Effexor-XR) 75 mg 24 hr capsule Take 1 capsule (75 mg) by mouth once daily.   4/22/2024    zonisamide (Zonegran) 100 mg capsule Take 2 capsules (200 mg) by mouth once daily. 180 capsule 3 4/22/2024       Active orders:  Active Orders   Lab    Basic metabolic panel     Frequency: AM draw     Number of Occurrences: Until Specified    CBC     Frequency: AM draw     Number of Occurrences: Until Specified    Magnesium     Frequency: AM draw     Number of Occurrences: Until Specified   Diet    NPO Diet; Effective now     Frequency: Effective now     Number of Occurrences: Until Specified   Nursing    Check pulse  oximetry     Frequency: q4h     Number of Occurrences: Until Specified    Immobilize cervical spine     Frequency: Until discontinued     Number of Occurrences: Until Specified    Maintain Urethral Catheter with Nurse Driven Indwelling Urethral Catheter Removal Protocol     Frequency: Until discontinued     Number of Occurrences: Until Specified    Notify provider - CIWA Score     Frequency: Until discontinued     Number of Occurrences: Until Specified     Order Comments: Increase in CIWA score Greater than 20 for transfer to ICU      Telemetry monitoring     Frequency: Until discontinued     Number of Occurrences: 3 Days    Vital Signs     Frequency: q4h     Number of Occurrences: Until Specified   Code Status    Full code     Frequency: Continuous     Number of Occurrences: Until Specified   Consult    Inpatient consult to Psychiatry     Frequency: Once     Number of Occurrences: 1 Occurrences    Inpatient consult to Social Work and TCC     Frequency: Once     Number of Occurrences: 1 Occurrences   Nourishments    May Participate in Room Service     Frequency: Once     Number of Occurrences: 1 Occurrences   ECG    Electrocardiogram, 12-lead PRN ACS symptoms     Frequency: PRN     Number of Occurrences: Until Specified     Order Comments: Notify provider if performed.     Precaution    Aspiration precautions     Frequency: Until discontinued     Number of Occurrences: Until Specified    Fall precautions     Frequency: Until discontinued     Number of Occurrences: Until Specified    Seizure precautions     Frequency: Until discontinued     Number of Occurrences: Until Specified   Medications    acetaminophen (Tylenol) suppository 650 mg     Frequency: q6h PRN     Dose: 650 mg     Route: rectal    dexAMETHasone (Decadron) injection 2 mg     Frequency: q8h     Dose: 2 mg     Route: intravenous    diazePAM (Valium) injection 5 mg     Frequency: q6h     Dose: 5 mg     Route: intravenous    folic acid (Folvite) tablet 1  mg     Frequency: Daily     Dose: 1 mg     Route: oral    HYDROmorphone (Dilaudid) injection 0.5 mg     Frequency: q3h PRN     Dose: 0.5 mg     Route: intravenous    lactated Ringer's infusion     Frequency: Continuous     Dose: 100 mL/hr     Route: intravenous    levothyroxine (Synthroid, Levoxyl) tablet 50 mcg     Frequency: Daily     Dose: 50 mcg     Route: oral    lidocaine 4 % patch 1 patch     Frequency: Daily     Dose: 1 patch     Route: transdermal    LORazepam (Ativan) injection 0.5 mg     Linked Order: Or     Frequency: q2h PRN     Dose: 0.5 mg     Route: intravenous    LORazepam (Ativan) injection 1 mg     Linked Order: Or     Frequency: q2h PRN     Dose: 1 mg     Route: intravenous    LORazepam (Ativan) injection 1 mg     Linked Order: Or     Frequency: q2h PRN     Dose: 1 mg     Route: intravenous    magnesium sulfate IV 4 g     Frequency: Once     Dose: 4 g     Route: intravenous    metoprolol tartrate (Lopressor) injection 5 mg     Frequency: q6h     Dose: 5 mg     Route: intravenous    multivitamin with minerals 1 tablet     Frequency: Daily     Dose: 1 tablet     Route: oral    oxyCODONE (Roxicodone) immediate release tablet 5 mg     Frequency: q6h PRN     Dose: 5 mg     Route: oral    polyethylene glycol (Glycolax, Miralax) packet 17 g     Frequency: Daily     Dose: 17 g     Route: oral    spironolactone (Aldactone) tablet 50 mg     Frequency: Daily     Dose: 50 mg     Route: oral    SUMAtriptan (Imitrex) tablet 25 mg     Frequency: Once PRN     Dose: 25 mg     Route: oral    thiamine (Vitamin B-1) tablet 100 mg     Frequency: Daily     Dose: 100 mg     Route: oral    thiamine (Vitamin B1) injection 100 mg     Frequency: Daily     Dose: 100 mg     Route: intravenous    venlafaxine XR (Effexor-XR) 24 hr capsule 75 mg     Frequency: Daily     Dose: 75 mg     Route: oral    zonisamide (Zonegran) capsule 200 mg     Frequency: Daily     Dose: 200 mg     Route: oral           Allergies:   Allergies    Allergen Reactions    Penicillins Hives, Itching and Rash     Tolerated Pip/Tazo (April and May 2022)       Review of Systems:  A 10 systems review of systems is negative except for HPI.      Vitals:  Patient Vitals for the past 24 hrs:   BP Temp Temp src Pulse Resp SpO2   04/24/24 1000 115/77 36.2 °C (97.2 °F) Temporal 73 16 97 %   04/24/24 0810 112/63 36.5 °C (97.7 °F) Temporal 64 18 99 %   04/24/24 0600 119/75 36.5 °C (97.7 °F) Temporal 67 18 96 %   04/24/24 0400 138/80 36.3 °C (97.3 °F) Temporal 73 18 96 %   04/24/24 0200 146/84 36.4 °C (97.5 °F) Temporal 69 16 95 %   04/24/24 0000 120/76 36.5 °C (97.7 °F) Temporal 66 18 98 %   04/23/24 2200 (!) 151/92 36.9 °C (98.4 °F) Temporal 80 18 96 %   04/23/24 2033 169/77 36.8 °C (98.2 °F) Temporal 70 16 99 %   04/23/24 2000 169/77 -- -- 70 -- --   04/23/24 1717 (!) 200/96 37.1 °C (98.8 °F) Temporal 80 20 100 %   04/23/24 1600 178/86 -- -- -- -- --   04/23/24 1230 173/83 -- -- 76 20 (!) 75 %   04/23/24 1215 -- -- -- 82 20 100 %   04/23/24 1200 173/86 -- -- 86 20 100 %   04/23/24 1145 -- -- -- 82 20 99 %     Body mass index is 20.14 kg/m².         Physical Exam:  General appearance: Well-appearing alert, in no acute distress and well-hydrated, well nourished  Skin: Skin color, texture, turgor normal, no suspicious rashes or lesions  Head: normal  Eyes: Anicteric sclera. Extraocular movements are intact.   Ears: external ears normal  Nose/Sinuses: Negative  Oropharynx: Lips, mucosa, and tongue normal, teeth and gums normal, oropharynx normal  Neck: Patient has c-collar  Lungs: Clear to auscultation, no wheezing or rhonchi  Heart: RRR without murmur, gallop, or rubs.  No ectopy  Abdomen: Soft, non-tender. Bowel sounds normal. No masses, organomegaly  Extremities: No deformity, no edema  Neuro: Alert oriented x3, no focal deficit.      Labs:  Results for orders placed or performed during the hospital encounter of 04/23/24 (from the past 24 hour(s))   Lactate   Result Value  Ref Range    Lactate 1.9 0.4 - 2.0 mmol/L   Creatine Kinase   Result Value Ref Range    Creatine Kinase 841 (H) 0 - 215 U/L   Troponin I, High Sensitivity   Result Value Ref Range    Troponin I, High Sensitivity 9 0 - 13 ng/L   Ammonia   Result Value Ref Range    Ammonia 28 16 - 53 umol/L   Lavender Top   Result Value Ref Range    Extra Tube Hold for add-ons.    SST TOP   Result Value Ref Range    Extra Tube Hold for add-ons.    Creatine Kinase   Result Value Ref Range    Creatine Kinase 708 (H) 0 - 215 U/L   CBC   Result Value Ref Range    WBC 8.1 4.4 - 11.3 x10*3/uL    nRBC 0.0 0.0 - 0.0 /100 WBCs    RBC 3.65 (L) 4.00 - 5.20 x10*6/uL    Hemoglobin 12.2 12.0 - 16.0 g/dL    Hematocrit 35.9 (L) 36.0 - 46.0 %    MCV 98 80 - 100 fL    MCH 33.4 26.0 - 34.0 pg    MCHC 34.0 32.0 - 36.0 g/dL    RDW 13.4 11.5 - 14.5 %    Platelets 249 150 - 450 x10*3/uL   Basic metabolic panel   Result Value Ref Range    Glucose 153 (H) 74 - 99 mg/dL    Sodium 135 (L) 136 - 145 mmol/L    Potassium 4.0 3.5 - 5.3 mmol/L    Chloride 105 98 - 107 mmol/L    Bicarbonate 20 (L) 21 - 32 mmol/L    Anion Gap 14 10 - 20 mmol/L    Urea Nitrogen 19 6 - 23 mg/dL    Creatinine 0.46 (L) 0.50 - 1.05 mg/dL    eGFR >90 >60 mL/min/1.73m*2    Calcium 8.8 8.6 - 10.3 mg/dL   Creatine Kinase   Result Value Ref Range    Creatine Kinase 482 (H) 0 - 215 U/L   Phosphorus   Result Value Ref Range    Phosphorus 3.7 2.5 - 4.9 mg/dL   Magnesium   Result Value Ref Range    Magnesium 1.71 1.60 - 2.40 mg/dL         Imaging:   Reviewed          Assessment/Plan:    Rhabdomyolysis  Continue IV fluids  Continue monitoring      ETOH abuse  Monitor for withdrawal symptoms      Fall  PT/OT      Elevated lactic acid level  IV fluids      Neck pain    Closed nondisplaced fracture of sixth cervical vertebra (Multi)  Neurosurgery consult  Plan for surgical intervention today      DVT Prophylaxis- Addressed    Discussed with patient, RN    SIGNATURE: Toan Arriaga MD  DATE: April 24,  "2024  TIME: 11:36 AM      This note was partially created using voice recognition software and is inherently subject to errors including those of syntax and \"sound-alike\" substitutions which may escape proofreading. In such instances, original meaning may be extrapolated by contextual derivation    "

## 2024-04-25 ENCOUNTER — APPOINTMENT (OUTPATIENT)
Dept: RADIOLOGY | Facility: HOSPITAL | Age: 74
DRG: 028 | End: 2024-04-25
Payer: MEDICARE

## 2024-04-25 LAB
ANION GAP SERPL CALC-SCNC: 12 MMOL/L (ref 10–20)
BUN SERPL-MCNC: 19 MG/DL (ref 6–23)
CALCIUM SERPL-MCNC: 8.3 MG/DL (ref 8.6–10.3)
CHLORIDE SERPL-SCNC: 107 MMOL/L (ref 98–107)
CK SERPL-CCNC: 339 U/L (ref 0–215)
CO2 SERPL-SCNC: 22 MMOL/L (ref 21–32)
CREAT SERPL-MCNC: 0.65 MG/DL (ref 0.5–1.05)
EGFRCR SERPLBLD CKD-EPI 2021: >90 ML/MIN/1.73M*2
ERYTHROCYTE [DISTWIDTH] IN BLOOD BY AUTOMATED COUNT: 13.4 % (ref 11.5–14.5)
GLUCOSE SERPL-MCNC: 126 MG/DL (ref 74–99)
HCT VFR BLD AUTO: 34.1 % (ref 36–46)
HGB BLD-MCNC: 11.5 G/DL (ref 12–16)
HOLD SPECIMEN: NORMAL
MAGNESIUM SERPL-MCNC: 2.14 MG/DL (ref 1.6–2.4)
MCH RBC QN AUTO: 33 PG (ref 26–34)
MCHC RBC AUTO-ENTMCNC: 33.7 G/DL (ref 32–36)
MCV RBC AUTO: 98 FL (ref 80–100)
NRBC BLD-RTO: 0 /100 WBCS (ref 0–0)
PLATELET # BLD AUTO: 273 X10*3/UL (ref 150–450)
POTASSIUM SERPL-SCNC: 3.9 MMOL/L (ref 3.5–5.3)
RBC # BLD AUTO: 3.48 X10*6/UL (ref 4–5.2)
SODIUM SERPL-SCNC: 137 MMOL/L (ref 136–145)
WBC # BLD AUTO: 14 X10*3/UL (ref 4.4–11.3)

## 2024-04-25 PROCEDURE — 1200000002 HC GENERAL ROOM WITH TELEMETRY DAILY

## 2024-04-25 PROCEDURE — 2500000001 HC RX 250 WO HCPCS SELF ADMINISTERED DRUGS (ALT 637 FOR MEDICARE OP): Performed by: PSYCHIATRY & NEUROLOGY

## 2024-04-25 PROCEDURE — 2500000004 HC RX 250 GENERAL PHARMACY W/ HCPCS (ALT 636 FOR OP/ED): Performed by: STUDENT IN AN ORGANIZED HEALTH CARE EDUCATION/TRAINING PROGRAM

## 2024-04-25 PROCEDURE — 70450 CT HEAD/BRAIN W/O DYE: CPT | Performed by: RADIOLOGY

## 2024-04-25 PROCEDURE — 97165 OT EVAL LOW COMPLEX 30 MIN: CPT | Mod: GO

## 2024-04-25 PROCEDURE — 97161 PT EVAL LOW COMPLEX 20 MIN: CPT | Mod: GP | Performed by: PHYSICAL THERAPIST

## 2024-04-25 PROCEDURE — 2500000001 HC RX 250 WO HCPCS SELF ADMINISTERED DRUGS (ALT 637 FOR MEDICARE OP): Performed by: STUDENT IN AN ORGANIZED HEALTH CARE EDUCATION/TRAINING PROGRAM

## 2024-04-25 PROCEDURE — 85027 COMPLETE CBC AUTOMATED: CPT | Performed by: NURSE PRACTITIONER

## 2024-04-25 PROCEDURE — 36415 COLL VENOUS BLD VENIPUNCTURE: CPT | Performed by: STUDENT IN AN ORGANIZED HEALTH CARE EDUCATION/TRAINING PROGRAM

## 2024-04-25 PROCEDURE — 83735 ASSAY OF MAGNESIUM: CPT | Performed by: STUDENT IN AN ORGANIZED HEALTH CARE EDUCATION/TRAINING PROGRAM

## 2024-04-25 PROCEDURE — 99222 1ST HOSP IP/OBS MODERATE 55: CPT | Performed by: PSYCHIATRY & NEUROLOGY

## 2024-04-25 PROCEDURE — 82374 ASSAY BLOOD CARBON DIOXIDE: CPT | Performed by: NURSE PRACTITIONER

## 2024-04-25 PROCEDURE — 82550 ASSAY OF CK (CPK): CPT | Performed by: NURSE PRACTITIONER

## 2024-04-25 PROCEDURE — 2500000006 HC RX 250 W HCPCS SELF ADMINISTERED DRUGS (ALT 637 FOR ALL PAYERS): Mod: MUE | Performed by: STUDENT IN AN ORGANIZED HEALTH CARE EDUCATION/TRAINING PROGRAM

## 2024-04-25 PROCEDURE — 70450 CT HEAD/BRAIN W/O DYE: CPT

## 2024-04-25 PROCEDURE — 90792 PSYCH DIAG EVAL W/MED SRVCS: CPT | Performed by: PSYCHIATRY & NEUROLOGY

## 2024-04-25 RX ORDER — CHLORDIAZEPOXIDE HYDROCHLORIDE 25 MG/1
25 CAPSULE, GELATIN COATED ORAL EVERY 8 HOURS
Status: COMPLETED | OUTPATIENT
Start: 2024-04-27 | End: 2024-04-29

## 2024-04-25 RX ORDER — HALOPERIDOL 5 MG/ML
2 INJECTION INTRAMUSCULAR EVERY 6 HOURS PRN
Status: DISCONTINUED | OUTPATIENT
Start: 2024-04-25 | End: 2024-05-01 | Stop reason: HOSPADM

## 2024-04-25 RX ORDER — CHLORDIAZEPOXIDE HYDROCHLORIDE 25 MG/1
25 CAPSULE, GELATIN COATED ORAL EVERY 6 HOURS
Status: COMPLETED | OUTPATIENT
Start: 2024-04-25 | End: 2024-04-27

## 2024-04-25 RX ORDER — CHLORDIAZEPOXIDE HYDROCHLORIDE 25 MG/1
25 CAPSULE, GELATIN COATED ORAL EVERY 12 HOURS
Status: DISCONTINUED | OUTPATIENT
Start: 2024-04-29 | End: 2024-04-29

## 2024-04-25 RX ADMIN — LORAZEPAM 2 MG: 2 INJECTION, SOLUTION INTRAMUSCULAR; INTRAVENOUS at 12:37

## 2024-04-25 RX ADMIN — CHLORDIAZEPOXIDE HYDROCHLORIDE 25 MG: 25 CAPSULE ORAL at 16:45

## 2024-04-25 RX ADMIN — FOLIC ACID 1 MG: 1 TABLET ORAL at 08:02

## 2024-04-25 RX ADMIN — HEPARIN SODIUM 5000 UNITS: 5000 INJECTION INTRAVENOUS; SUBCUTANEOUS at 16:42

## 2024-04-25 RX ADMIN — OXYCODONE HYDROCHLORIDE 5 MG: 5 TABLET ORAL at 03:39

## 2024-04-25 RX ADMIN — POLYETHYLENE GLYCOL 3350 17 G: 17 POWDER, FOR SOLUTION ORAL at 08:02

## 2024-04-25 RX ADMIN — CHLORDIAZEPOXIDE HYDROCHLORIDE 25 MG: 25 CAPSULE ORAL at 22:02

## 2024-04-25 RX ADMIN — ZONISAMIDE 200 MG: 100 CAPSULE ORAL at 08:02

## 2024-04-25 RX ADMIN — CYCLOBENZAPRINE HYDROCHLORIDE 5 MG: 5 TABLET, FILM COATED ORAL at 21:33

## 2024-04-25 RX ADMIN — CYCLOBENZAPRINE HYDROCHLORIDE 5 MG: 5 TABLET, FILM COATED ORAL at 16:42

## 2024-04-25 RX ADMIN — HEPARIN SODIUM 5000 UNITS: 5000 INJECTION INTRAVENOUS; SUBCUTANEOUS at 00:13

## 2024-04-25 RX ADMIN — HEPARIN SODIUM 5000 UNITS: 5000 INJECTION INTRAVENOUS; SUBCUTANEOUS at 08:02

## 2024-04-25 RX ADMIN — LORAZEPAM 2 MG: 2 INJECTION, SOLUTION INTRAMUSCULAR; INTRAVENOUS at 08:01

## 2024-04-25 RX ADMIN — LORAZEPAM 2 MG: 2 INJECTION, SOLUTION INTRAMUSCULAR; INTRAVENOUS at 20:16

## 2024-04-25 RX ADMIN — Medication 1 TABLET: at 08:02

## 2024-04-25 RX ADMIN — CYCLOBENZAPRINE HYDROCHLORIDE 5 MG: 5 TABLET, FILM COATED ORAL at 08:02

## 2024-04-25 RX ADMIN — LORAZEPAM 2 MG: 2 INJECTION, SOLUTION INTRAMUSCULAR; INTRAVENOUS at 16:42

## 2024-04-25 RX ADMIN — THIAMINE HYDROCHLORIDE 100 MG: 100 INJECTION, SOLUTION INTRAMUSCULAR; INTRAVENOUS at 08:02

## 2024-04-25 RX ADMIN — LORAZEPAM 1 MG: 2 INJECTION, SOLUTION INTRAMUSCULAR; INTRAVENOUS at 22:12

## 2024-04-25 ASSESSMENT — LIFESTYLE VARIABLES
PAROXYSMAL SWEATS: NO SWEAT VISIBLE
ANXIETY: NO ANXIETY, AT EASE
TOTAL SCORE: 2
AUDITORY DISTURBANCES: NOT PRESENT
AGITATION: NORMAL ACTIVITY
AGITATION: 2
NAUSEA AND VOMITING: NO NAUSEA AND NO VOMITING
AUDITORY DISTURBANCES: NOT PRESENT
AUDITORY DISTURBANCES: NOT PRESENT
VISUAL DISTURBANCES: NOT PRESENT
BLOOD PRESSURE: 169/84
ORIENTATION AND CLOUDING OF SENSORIUM: CANNOT DO SERIAL ADDITIONS OR IS UNCERTAIN ABOUT DATE
TOTAL SCORE: 15
PAROXYSMAL SWEATS: 2
TREMOR: NOT VISIBLE, BUT CAN BE FELT FINGERTIP TO FINGERTIP
TREMOR: NOT VISIBLE, BUT CAN BE FELT FINGERTIP TO FINGERTIP
ANXIETY: MODERATELY ANXIOUS, OR GUARDED, SO ANXIETY IS INFERRED
PULSE: 89
ANXIETY: MODERATELY ANXIOUS, OR GUARDED, SO ANXIETY IS INFERRED
TREMOR: 2
PAROXYSMAL SWEATS: 2
AGITATION: 3
AGITATION: 2
HEADACHE, FULLNESS IN HEAD: NOT PRESENT
PAROXYSMAL SWEATS: BARELY PERCEPTIBLE SWEATING, PALMS MOIST
BLOOD PRESSURE: 123/58
ORIENTATION AND CLOUDING OF SENSORIUM: ORIENTED AND CAN DO SERIAL ADDITIONS
ANXIETY: MILDLY ANXIOUS
AUDITORY DISTURBANCES: NOT PRESENT
BLOOD PRESSURE: 170/84
NAUSEA AND VOMITING: NO NAUSEA AND NO VOMITING
PULSE: 86
VISUAL DISTURBANCES: NOT PRESENT
NAUSEA AND VOMITING: NO NAUSEA AND NO VOMITING
ORIENTATION AND CLOUDING OF SENSORIUM: DISORIENTED FOR PLACE OR PERSON
ORIENTATION AND CLOUDING OF SENSORIUM: CANNOT DO SERIAL ADDITIONS OR IS UNCERTAIN ABOUT DATE
PAROXYSMAL SWEATS: NO SWEAT VISIBLE
ANXIETY: MODERATELY ANXIOUS, OR GUARDED, SO ANXIETY IS INFERRED
VISUAL DISTURBANCES: NOT PRESENT
AUDITORY DISTURBANCES: NOT PRESENT
AGITATION: 3
HEADACHE, FULLNESS IN HEAD: NOT PRESENT
VISUAL DISTURBANCES: NOT PRESENT
PULSE: 95
VISUAL DISTURBANCES: NOT PRESENT
HEADACHE, FULLNESS IN HEAD: NOT PRESENT
ORIENTATION AND CLOUDING OF SENSORIUM: DISORIENTED FOR PLACE OR PERSON
ORIENTATION AND CLOUDING OF SENSORIUM: ORIENTED AND CAN DO SERIAL ADDITIONS
TOTAL SCORE: 1
HEADACHE, FULLNESS IN HEAD: NOT PRESENT
TREMOR: 2
ANXIETY: MODERATELY ANXIOUS, OR GUARDED, SO ANXIETY IS INFERRED
VISUAL DISTURBANCES: NOT PRESENT
PAROXYSMAL SWEATS: 2
ANXIETY: 3
HEADACHE, FULLNESS IN HEAD: NOT PRESENT
HEADACHE, FULLNESS IN HEAD: NOT PRESENT
PAROXYSMAL SWEATS: 2
TOTAL SCORE: 16
AGITATION: MODERATELY FIDGETY AND RESTLESS
TOTAL SCORE: 11
HEADACHE, FULLNESS IN HEAD: NOT PRESENT
TOTAL SCORE: 9
TOTAL SCORE: 14
AUDITORY DISTURBANCES: NOT PRESENT
BLOOD PRESSURE: 122/68
NAUSEA AND VOMITING: NO NAUSEA AND NO VOMITING
AUDITORY DISTURBANCES: NOT PRESENT
TREMOR: 2
BLOOD PRESSURE: 184/93
AGITATION: NORMAL ACTIVITY
TREMOR: NOT VISIBLE, BUT CAN BE FELT FINGERTIP TO FINGERTIP
VISUAL DISTURBANCES: NOT PRESENT
ORIENTATION AND CLOUDING OF SENSORIUM: DISORIENTED FOR PLACE OR PERSON
TREMOR: 2
PULSE: 73
NAUSEA AND VOMITING: NO NAUSEA AND NO VOMITING

## 2024-04-25 ASSESSMENT — PAIN SCALES - GENERAL
PAINLEVEL_OUTOF10: 4
PAINLEVEL_OUTOF10: 10 - WORST POSSIBLE PAIN
PAINLEVEL_OUTOF10: 5 - MODERATE PAIN

## 2024-04-25 ASSESSMENT — COGNITIVE AND FUNCTIONAL STATUS - GENERAL
TURNING FROM BACK TO SIDE WHILE IN FLAT BAD: A LOT
DRESSING REGULAR LOWER BODY CLOTHING: A LOT
MOBILITY SCORE: 12
MOVING FROM LYING ON BACK TO SITTING ON SIDE OF FLAT BED WITH BEDRAILS: A LOT
DRESSING REGULAR LOWER BODY CLOTHING: A LOT
CLIMB 3 TO 5 STEPS WITH RAILING: A LOT
PERSONAL GROOMING: A LOT
PERSONAL GROOMING: A LOT
HELP NEEDED FOR BATHING: A LOT
EATING MEALS: A LOT
DAILY ACTIVITIY SCORE: 12
MOVING TO AND FROM BED TO CHAIR: A LOT
TURNING FROM BACK TO SIDE WHILE IN FLAT BAD: A LOT
TOILETING: A LOT
MOVING TO AND FROM BED TO CHAIR: A LOT
HELP NEEDED FOR BATHING: A LOT
MOBILITY SCORE: 12
WALKING IN HOSPITAL ROOM: A LOT
STANDING UP FROM CHAIR USING ARMS: A LOT
DRESSING REGULAR UPPER BODY CLOTHING: A LOT
MOBILITY SCORE: 11
MOVING FROM LYING ON BACK TO SITTING ON SIDE OF FLAT BED WITH BEDRAILS: A LOT
DAILY ACTIVITIY SCORE: 13
TOILETING: A LOT
MOVING TO AND FROM BED TO CHAIR: A LOT
DRESSING REGULAR UPPER BODY CLOTHING: A LOT
WALKING IN HOSPITAL ROOM: A LOT
DAILY ACTIVITIY SCORE: 13
DRESSING REGULAR UPPER BODY CLOTHING: A LOT
DRESSING REGULAR LOWER BODY CLOTHING: A LOT
WALKING IN HOSPITAL ROOM: A LOT
TURNING FROM BACK TO SIDE WHILE IN FLAT BAD: A LOT
EATING MEALS: A LITTLE
TOILETING: A LOT
MOVING FROM LYING ON BACK TO SITTING ON SIDE OF FLAT BED WITH BEDRAILS: A LOT
HELP NEEDED FOR BATHING: A LOT
STANDING UP FROM CHAIR USING ARMS: A LOT
CLIMB 3 TO 5 STEPS WITH RAILING: TOTAL
CLIMB 3 TO 5 STEPS WITH RAILING: A LOT
EATING MEALS: A LITTLE
STANDING UP FROM CHAIR USING ARMS: A LOT
PERSONAL GROOMING: A LOT

## 2024-04-25 ASSESSMENT — PAIN - FUNCTIONAL ASSESSMENT
PAIN_FUNCTIONAL_ASSESSMENT: 0-10

## 2024-04-25 ASSESSMENT — PAIN DESCRIPTION - LOCATION: LOCATION: NECK

## 2024-04-25 ASSESSMENT — PAIN DESCRIPTION - ORIENTATION: ORIENTATION: POSTERIOR

## 2024-04-25 NOTE — CARE PLAN
Problem: Skin  Goal: Decreased wound size/increased tissue granulation at next dressing change  Outcome: Progressing  Goal: Participates in plan/prevention/treatment measures  Outcome: Progressing  Goal: Prevent/manage excess moisture  Outcome: Progressing  Goal: Prevent/minimize sheer/friction injuries  Outcome: Progressing  Goal: Promote/optimize nutrition  Outcome: Progressing  Goal: Promote skin healing  Outcome: Progressing     Problem: Pain  Goal: Takes deep breaths with improved pain control throughout the shift  Outcome: Progressing  Goal: Turns in bed with improved pain control throughout the shift  Outcome: Progressing  Goal: Walks with improved pain control throughout the shift  Outcome: Progressing  Goal: Performs ADL's with improved pain control throughout shift  Outcome: Progressing  Goal: Participates in PT with improved pain control throughout the shift  Outcome: Progressing  Goal: Free from opioid side effects throughout the shift  Outcome: Progressing  Goal: Free from acute confusion related to pain meds throughout the shift  Outcome: Progressing     Problem: Pain - Adult  Goal: Verbalizes/displays adequate comfort level or baseline comfort level  Outcome: Progressing     Problem: Dressings Lower Extremities  Goal: STG - Patient to complete lower body dressing SUP  Outcome: Progressing     Problem: Grooming  Goal: STG - Patient completes grooming SUP  Outcome: Progressing  Goal: STG - Patient will tolerate standing 2-4min  Outcome: Progressing   The patient's goals for the shift include       No acute events occurred and safety was maintained. CIWA protocol maintained and medicated as ordered. Pt tolerated straight cath and enriquez placement this shift. Pt tolerated head CT. Pts  kept updated at bedside throughout shift. Pt resting in bed with safety alarms active and call light within reach.     Julieta Richard RN

## 2024-04-25 NOTE — CONSULTS
Inpatient consult to Neurology  Consult performed by: MAXIMILIANO Rizzo-CNP  Consult ordered by: MAXIMILIANO Madrigal-CNP          History Of Present Illness  Katya Sandhu is a 74 y.o. female admitted on 04/23 for rhabdomyolysis, alcohol withdrawal, and fall after presenting with back pain. Yesterday, pt had aC6-7 ACDF with reduction of fracture by Dr. Min (neurosurgery). Shortly afterwards, she had CIWA activated for alcohol withdrawal protocol. Primary care team concerned today because pt has been repeating herself when asked questions or to follow commands. She had an unremarkable CT head on admission. She was seen at bedside with Dr. Quinones. She was able to follow commands but when asked questions, she did perseverate at times. She does not recall events that led to this hospitalization.    Past Medical History  Past Medical History:   Diagnosis Date    Brain lesion     Depression     ETOH abuse     HLD (hyperlipidemia)     Hypertension     Hypothyroidism      Surgical History  Past Surgical History:   Procedure Laterality Date    BRAIN SURGERY      CT ANGIO NECK  04/24/2022    CT NECK ANGIO W AND WO IV CONTRAST 4/24/2022 [facility]ICAL [facility]GAC[facility]    CT HEAD ANGIO W AND WO IV CONTRAST  04/24/2022    CT HEAD ANGIO W AND WO IV CONTRAST 4/24/2022 [facility] CLINICAL LEGAC[facility]    FOOT SURGERY      TONSILLECTOMY       Social History  Social History     Tobacco Use    Smoking status: Former     Types: Cigarettes    Smokeless tobacco: Never   Substance Use Topics    Alcohol use: Yes     Alcohol/week: 3.0 standard drinks of alcohol     Types: 3 Glasses of wine per week     Comment: 1 glass of vodka sometines    Drug use: Never     Allergies  Penicillins  Medications Prior to Admission   Medication Sig Dispense Refill Last Dose    levothyroxine (Synthroid, Levoxyl) 50 mcg tablet Take 1 tablet (50 mcg) by mouth once daily.   4/22/2024    meloxicam (Mobic) 7.5 mg tablet Take 1 tablet (7.5 mg) by mouth once  "daily as needed for mild pain (1 - 3).   Past Week    spironolactone (Aldactone) 50 mg tablet Take 1 tablet (50 mg) by mouth once daily.   4/22/2024    SUMAtriptan (Imitrex) 25 mg tablet Take 1 tablet (25 mg) by mouth 1 time if needed for migraine. May repeat dose once in 2 hours if no relief.  Do not exceed 2 doses in 24 hours.   Past Month    venlafaxine XR (Effexor-XR) 75 mg 24 hr capsule Take 1 capsule (75 mg) by mouth once daily.   4/22/2024    zonisamide (Zonegran) 100 mg capsule Take 2 capsules (200 mg) by mouth once daily. 180 capsule 3 4/22/2024       Review of Systems  Pt denies headache, dizziness, confusion, vision or speech changes, limb weakness, or sensory changes. ROS: 12 systems reviewed and negative except per HPI above    Neurological Exam  Physical Exam  Gen: NAD  Neuro:  --Mental status: A&O X 2, perseverating, repetition and naming intact. Follows commands.  --CN:  PERRLA, EOMI, VFF, no visible facial asymmetry, facial sensation intact, no tongue or palatal deviation  --Motor: Moves all 4 extremities equally; no focal deficits  --Sensory: Intact to light touch    --Cerebellum: FTN intact  --Gait: Deferred     NIHSS: 0    Last Recorded Vitals  Blood pressure 170/84, pulse 95, temperature 37 °C (98.6 °F), resp. rate 18, height 1.651 m (5' 5\"), weight 54.9 kg (121 lb 0.5 oz), SpO2 97%.    Relevant Results    Scheduled medications  chlordiazePOXIDE, 25 mg, oral, q6h   Followed by  [START ON 4/27/2024] chlordiazePOXIDE, 25 mg, oral, q8h   Followed by  [START ON 4/29/2024] chlordiazePOXIDE, 25 mg, oral, q12h  cyclobenzaprine, 5 mg, oral, TID  folic acid, 1 mg, oral, Daily  heparin (porcine), 5,000 Units, subcutaneous, q8h  multivitamin with minerals, 1 tablet, oral, Daily  polyethylene glycol, 17 g, oral, Daily  thiamine, 100 mg, intravenous, Daily  zonisamide, 200 mg, oral, Daily      Continuous medications     PRN medications  PRN medications: acetaminophen, dextrose, dextrose, glucagon, glucagon, " haloperidol lactate, HYDROmorphone, LORazepam **OR** LORazepam **OR** LORazepam, naloxone, ondansetron ODT **OR** ondansetron, oxyCODONE, oxyCODONE  Results for orders placed or performed during the hospital encounter of 04/23/24 (from the past 24 hour(s))   Magnesium   Result Value Ref Range    Magnesium 2.14 1.60 - 2.40 mg/dL   Basic Metabolic Panel   Result Value Ref Range    Glucose 126 (H) 74 - 99 mg/dL    Sodium 137 136 - 145 mmol/L    Potassium 3.9 3.5 - 5.3 mmol/L    Chloride 107 98 - 107 mmol/L    Bicarbonate 22 21 - 32 mmol/L    Anion Gap 12 10 - 20 mmol/L    Urea Nitrogen 19 6 - 23 mg/dL    Creatinine 0.65 0.50 - 1.05 mg/dL    eGFR >90 >60 mL/min/1.73m*2    Calcium 8.3 (L) 8.6 - 10.3 mg/dL   Creatine Kinase   Result Value Ref Range    Creatine Kinase 339 (H) 0 - 215 U/L   Lavender Top   Result Value Ref Range    Extra Tube Hold for add-ons.    CBC   Result Value Ref Range    WBC 14.0 (H) 4.4 - 11.3 x10*3/uL    nRBC 0.0 0.0 - 0.0 /100 WBCs    RBC 3.48 (L) 4.00 - 5.20 x10*6/uL    Hemoglobin 11.5 (L) 12.0 - 16.0 g/dL    Hematocrit 34.1 (L) 36.0 - 46.0 %    MCV 98 80 - 100 fL    MCH 33.0 26.0 - 34.0 pg    MCHC 33.7 32.0 - 36.0 g/dL    RDW 13.4 11.5 - 14.5 %    Platelets 273 150 - 450 x10*3/uL              I have personally reviewed the following imaging results FL fluoro images no charge    Result Date: 4/24/2024  These images are not reportable by radiology and will not be interpreted by  Radiologists.    CT head wo IV contrast    Addendum Date: 4/24/2024    Interpreted By:  Guerrero Zurita, ADDENDUM: Especially with the benefit of subsequently performed MRI, review of the preceding CT shows interval new splaying of the posterior elements at C6-7; probably acute fragmentation of the lower tip of the left facet and near perching of facets also on the left. Focal kyphosis at same level. Cord impingement confirmed on subsequent MRI   Signed by: Guerrero Zurita 4/24/2024 3:00 PM   -------- ORIGINAL REPORT --------  Dictation workstation:   [id number]    Result Date: 4/24/2024  Interpreted By:  Guerrero Zurita, STUDY: CT HEAD WO IV CONTRAST; CT CERVICAL SPINE WO IV CONTRAST;  4/23/2024 10:20 am   INDICATION: Signs/Symptoms:Fall head injury.   COMPARISON: CT brain and cervical spine both from 28 December 2023   ACCESSION NUMBER(S): [id number]; [id number]   ORDERING CLINICIAN: CECI HUYNH   TECHNIQUE: CT of the brain from the skull vertex to the skull base, without intravenous contrast   CT cervical spine from the craniocervical junction through the cervicothoracic junction without IV contrast, including sagittal and coronal reformatted images   FINDINGS: CT BRAIN   TRAUMA-RELATED   Brain Injury (BIG) guidelines CT values:   Skull fracture: No SDH (subdural hematoma): None detected EDH (epidural hemtoma): None detected IPH (intraparenchymal hemorrhage): None detected SAH (subarachnoid hemorrhage): None detected IVH (intraventricular hemorrhage): None detected   Reference: Bert CAMPOS, John Paul RS, Madelin M, et al. The BIG (brain injury guidelines) project: defining the management of traumatic brain injury by acute care surgeons. J Trauma Acute Care Surg. 2014;76:672i638.   OTHER   ACUTE INTRACRANIAL MASS EFFECT:  Negative   CT EVIDENCE OF ACUTE / SUBACUTE TERRITORIAL ISCHEMIA:  Negative   VENTRICLES:  Normal caliber and configuration   OTHER BRAIN FINDINGS:  No additional findings to note   INCLUDED PARANASAL SINUSES: All clear   INCLUDED MASTOID AIR CELLS: All clear   SKULL:  No lytic or blastic lesion   EXTRACRANIAL SOFT TISSUES:  Large right paramidline scalp hematoma. The skin surface over the area of hematoma is cut out of the field of view. Field-of-view contains no subcutaneous gas or subcutaneous radiopaque foreign body   -------   CT CERVICAL SPINE   COUNTING REFERENCE: Craniocervical junction   CRANIOCERVICAL JUNCTION:  Intact   CERVICAL ALIGNMENT:  Anatomic; no acute traumatic alignment abnormality such as subluxation    ACUTE FRACTURE: Negative   AGGRESSIVE OSSEOUS LESION: Negative   BONY CANAL AND FORAMINA:  No significant interval change from prior. This exam was interpreted on a time sensitive basis in the context of trauma, not for radiculopathy as an outpatient   PARASPINAL SOFT TISSUES:  No large acute hematoma or other acute posttraumatic finding   OTHER INCLUDED STRUCTURES:  No acute or contributory soft tissue abnormality in the other cervical and upper thoracic soft tissues       LARGE SCALP HEMATOMA. SKIN SURFACE OVER THE SCALP HEMATOMA IS NOT INCLUDED IN THE FIELD OF VIEW. FIELD-OF-VIEW DOES NOT INCLUDE ANY SUBCUTANEOUS GAS OR SUBCUTANEOUS RADIOPAQUE FOREIGN BODY   NO ACUTE INTRACRANIAL PROCESS. SKULL INTACT   NO ACUTE FRACTURE OR SUBLUXATION IN THE CERVICAL SPINE   THIS REPORT SERVES AS THE DIAGNOSTIC INTERPRETATION FOR TWO EXAMS PERFORMED CONCURRENTLY: CT BRAIN WITHOUT IV CONTRAST AND CT CERVICAL SPINE WITHOUT IV CONTRAST   MACRO: None   Signed by: Guerrero Zurita 4/23/2024 10:42 AM Dictation workstation:   [id number]    CT cervical spine wo IV contrast    Addendum Date: 4/24/2024    Interpreted By:  Guerrero Zurita, ADDENDUM: Especially with the benefit of subsequently performed MRI, review of the preceding CT shows interval new splaying of the posterior elements at C6-7; probably acute fragmentation of the lower tip of the left facet and near perching of facets also on the left. Focal kyphosis at same level. Cord impingement confirmed on subsequent MRI   Signed by: Guerrero Zurita 4/24/2024 3:00 PM   -------- ORIGINAL REPORT -------- Dictation workstation:   [id number]    Result Date: 4/24/2024  Interpreted By:  Guerrero Zurita, STUDY: CT HEAD WO IV CONTRAST; CT CERVICAL SPINE WO IV CONTRAST;  4/23/2024 10:20 am   INDICATION: Signs/Symptoms:Fall head injury.   COMPARISON: CT brain and cervical spine both from 28 December 2023   ACCESSION NUMBER(S): [id number]; [id number]   ORDERING CLINICIAN: CECI HUYNH   TECHNIQUE:  CT of the brain from the skull vertex to the skull base, without intravenous contrast   CT cervical spine from the craniocervical junction through the cervicothoracic junction without IV contrast, including sagittal and coronal reformatted images   FINDINGS: CT BRAIN   TRAUMA-RELATED   Brain Injury (BIG) guidelines CT values:   Skull fracture: No SDH (subdural hematoma): None detected EDH (epidural hemtoma): None detected IPH (intraparenchymal hemorrhage): None detected SAH (subarachnoid hemorrhage): None detected IVH (intraventricular hemorrhage): None detected   Reference: Bert CAMPOS, John Paul RS, Madelin DENSON, et al. The BIG (brain injury guidelines) project: defining the management of traumatic brain injury by acute care surgeons. J Trauma Acute Care Surg. 2014;76:330s336.   OTHER   ACUTE INTRACRANIAL MASS EFFECT:  Negative   CT EVIDENCE OF ACUTE / SUBACUTE TERRITORIAL ISCHEMIA:  Negative   VENTRICLES:  Normal caliber and configuration   OTHER BRAIN FINDINGS:  No additional findings to note   INCLUDED PARANASAL SINUSES: All clear   INCLUDED MASTOID AIR CELLS: All clear   SKULL:  No lytic or blastic lesion   EXTRACRANIAL SOFT TISSUES:  Large right paramidline scalp hematoma. The skin surface over the area of hematoma is cut out of the field of view. Field-of-view contains no subcutaneous gas or subcutaneous radiopaque foreign body   -------   CT CERVICAL SPINE   COUNTING REFERENCE: Craniocervical junction   CRANIOCERVICAL JUNCTION:  Intact   CERVICAL ALIGNMENT:  Anatomic; no acute traumatic alignment abnormality such as subluxation   ACUTE FRACTURE: Negative   AGGRESSIVE OSSEOUS LESION: Negative   BONY CANAL AND FORAMINA:  No significant interval change from prior. This exam was interpreted on a time sensitive basis in the context of trauma, not for radiculopathy as an outpatient   PARASPINAL SOFT TISSUES:  No large acute hematoma or other acute posttraumatic finding   OTHER INCLUDED STRUCTURES:  No acute or contributory  soft tissue abnormality in the other cervical and upper thoracic soft tissues       LARGE SCALP HEMATOMA. SKIN SURFACE OVER THE SCALP HEMATOMA IS NOT INCLUDED IN THE FIELD OF VIEW. FIELD-OF-VIEW DOES NOT INCLUDE ANY SUBCUTANEOUS GAS OR SUBCUTANEOUS RADIOPAQUE FOREIGN BODY   NO ACUTE INTRACRANIAL PROCESS. SKULL INTACT   NO ACUTE FRACTURE OR SUBLUXATION IN THE CERVICAL SPINE   THIS REPORT SERVES AS THE DIAGNOSTIC INTERPRETATION FOR TWO EXAMS PERFORMED CONCURRENTLY: CT BRAIN WITHOUT IV CONTRAST AND CT CERVICAL SPINE WITHOUT IV CONTRAST   MACRO: None   Signed by: Guerrero Zurita 4/23/2024 10:42 AM Dictation workstation:   [id number]    MR cervical spine wo IV contrast    Result Date: 4/23/2024  Interpreted By:  Lisa James, STUDY: MR CERVICAL SPINE WO IV CONTRAST;  4/23/2024 7:47 pm   INDICATION: Signs/Symptoms:Neck pain. S/P fall down flight of steps.   COMPARISON: CT cervical spine 04/23/2024   ACCESSION NUMBER(S): [id number]   ORDERING CLINICIAN: ANA LAURA KEY   TECHNIQUE: Sagittal T1, T2, STIR, axial T1 and axial T2 weighted images were acquired through the cervical spine.   FINDINGS: There is destruction of the ligamentum flavum at the level of C6-C7 best identified on sagittal T2 imaging, with widening of the interspinous space and mild focal C6-C7 kyphosis. There is facet joint edema and slightly perched facets noted bilaterally. There is anterior buckling of the cortex of the anterior superior C7 vertebral body and minimal prevertebral edema noted. There is minimal irregularity of the posterior longitudinal ligament at C6-C7. Intervertebral disc space edema is noted. Findings results in severe canal stenosis with cord deformity at C6-C7. There is slightly edematous appearance of the cord superior and inferior to this level with mild cord T2/STIR hyperintense signal dorsal to the C7 vertebral body. There is a small dorsal epidural collection which extends from C6-C7 through T1-T2 measuring up to 2 mm (AP).  There is associated diffuse paraspinal edema which extends predominately from the level of C6-C7 cranially to the suboccipital region   Mild superior endplate edema noted at T1 without significant vertebral body height loss. Vertebral body and facet joint alignment is otherwise maintained. Intervertebral disc spaces are otherwise maintained.   C1-C2: The cervicomedullary junction appears unremarkable. There is no central canal stenosis.   C2-C3: There is no significant central canal or neural foraminal stenosis.   C3-C4: Mild right foraminal narrowing, otherwise there is no significant central canal or neural foraminal stenosis.   C4-C5: Minimal canal and right foraminal narrowing present. The left foramen is patent.   C5-C6: Mild canal and moderate to severe right foraminal narrowing. No significant left foraminal narrowing.   C6-C7: Canal stenosis an epidural collection as well as additional findings as detailed above.   C7-T1: For is epidural collection. There is no significant central canal or neural foraminal stenosis.       There is traumatic kyphosis at C6-C7 with persistence and disruption of the ligamentum flavum. There is irregularity of the C6-C7 posterior longitudinal ligament with buckling and impaction anteriorly of the anterior longitudinal ligament and C7 vertebral body. Findings result in severe canal stenosis with secondary cord edema and trace epidural hematoma. Correlation for two-column spinal injury also recommended. Neuro surgical consult recommended.   Endplate edema at T1 concerning for a nondisplaced superior endplate fracture.   Extensive paraspinal edema with probable strain/sprain and mild prevertebral edema.   MACRO: Lisa James discussed the significance and urgency of this critical finding by telephone with  ANA LAURA KEY on 4/23/2024 at 9:58 pm.  (**-RCF-**) Findings:  See findings.   Signed by: Lisa James 4/23/2024 10:02 PM Dictation workstation:   [id number]    CT chest  abdomen pelvis w IV contrast    Result Date: 4/23/2024  STUDY: CT Abdomen, and Pelvis with IV Contrast, CT Thoracic Spine and Lumbar Spine without IV Contrast; 4/23/2024 10:23 AM INDICATION: Back pain post fall. COMPARISON: 53/3/2022 XR abdomen. ACCESSION NUMBER(S): [id number], [id number], [id number] ORDERING CLINICIAN: CECI HUYNH TECHNIQUE: CT of the abdomen and pelvis was performed.  Contiguous axial images were obtained at 3 mm slice thickness through the abdomen and pelvis. Coronal and sagittal reconstructions at 3 mm slice thickness were performed.  Omnipaque 350 65 mL was administered intravenously. Please note that spinal images were generated from the original CT abdomen and pelvis imaging. FINDINGS: CHEST: MEDIASTINUM: The heart is normal in size without pericardial effusion.  Central vascular structures opacify normally.  There is prominent fluid collection along the left aspect of the mediastinum measuring 14.4 x 9.8 x 3.8 cm suggesting large pericardial cyst. LUNGS/PLEURA: There is no pleural effusion or pneumothorax.  The airways are patent. There is mild atelectasis adjacent to the left pericardial fluid collection.  There is small cystic focus right middle lobe.  There are no pulmonary nodules or masses. LYMPH NODES: Thoracic lymph nodes are not enlarged. ABDOMEN:  LIVER: Small hypodensity adjacent to the gallbladder suggests hepatic cyst.  BILE DUCTS: No intrahepatic or extrahepatic biliary ductal dilatation.  GALLBLADDER: The gallbladder is present and demonstrates small dependent stones versus sludge. STOMACH: No abnormalities identified.  PANCREAS: No masses or ductal dilatation.  SPLEEN: No splenomegaly or focal splenic lesion.  ADRENAL GLANDS: No thickening or nodules.  KIDNEYS AND URETERS: Kidneys are normal in size and location.  No renal or ureteral calculi.  Tiny hypodensities within the kidney suggest renal cyst.  PELVIS:  BLADDER: There is a small amount of gas in the anterior  aspect of the bladder. Please correlate for recent instrumentation.  REPRODUCTIVE ORGANS: No abnormalities identified.  BOWEL: No abnormalities identified.  The appendix is normal.  VESSELS: No abnormalities identified.  Abdominal aorta is normal in caliber.  PERITONEUM/RETROPERITONEUM/LYMPH NODES: No free fluid.  No pneumoperitoneum. No lymphadenopathy.  ABDOMINAL WALL: No abnormalities identified. SOFT TISSUES: No abnormalities identified.  BONES: No acute fracture or aggressive osseous lesion. THORACIC SPINE: The alignment is anatomic.  There is no fracture or traumatic subluxation.  There is mild disc space narrowing.  There are small anterior marginal osteophytes in the lower thoracic spine extending T8-T10. The vertebral body heights are well maintained.  Disc spaces are preserved.  No significant central canal stenosis is demonstrated. The neural foramina are patent throughout.  The paravertebral soft tissues are within normal limits. LUMBAR SPINE: The alignment is anatomic.  There is no fracture or traumatic subluxation. There is superior endplate concavity and Schmorl's node deformity at L1 with mild anterior wedging which appears chronic.  There is disc space narrowing and mild endplate degenerative changes.  There is mild to moderate facet arthrosis. At L2-3, there is mild annular disc bulge and mild facet arthrosis. There is mild central canal stenosis with mild to moderate bilateral neural foraminal stenosis. At L3-4 there is broad-based disc bulge and moderate facet arthrosis. There is moderate to advanced central canal stenosis and advanced right with moderate to advanced left neural foraminal stenosis. At L4-5 there is broad-based disc bulge and central disc protrusion with facet arthrosis contributing to advanced high-grade central canal stenosis as well as advanced bilateral neural foraminal stenosis. At L5-S1 is broad-based disc bulge and central disc protrusion with facet arthrosis.  There is at  least moderate central canal stenosis with moderate to advanced right and mild left neural foraminal stenosis. The paravertebral soft tissues are within normal limits.      Chest: No acute traumatic injury. Large cystic focus along the left aspect of the mediastinum suggesting pericardial cyst.  No other acute cardiopulmonary disease. Abdomen: No acute traumatic injury and no acute inflammatory changes. Gallstones versus sludge within the gallbladder. Pelvis: No acute traumatic injury or inflammatory changes. Small amount gas in the bladder.  Please correlate for recent instrumentation. Thoracic spine: No acute fracture or malalignment. Lumbar spine: No acute fracture or malalignment.  Degenerative changes as described.  Findings are greater at L4-5.  Recommend follow-up MRI for further evaluation unless contraindicated. Signed by Saulo Castillo, DO    CT thoracic spine wo IV contrast    Result Date: 4/23/2024  STUDY: CT Abdomen, and Pelvis with IV Contrast, CT Thoracic Spine and Lumbar Spine without IV Contrast; 4/23/2024 10:23 AM INDICATION: Back pain post fall. COMPARISON: 53/3/2022 XR abdomen. ACCESSION NUMBER(S): [id number], [id number], [id number] ORDERING CLINICIAN: CECI HUYNH TECHNIQUE: CT of the abdomen and pelvis was performed.  Contiguous axial images were obtained at 3 mm slice thickness through the abdomen and pelvis. Coronal and sagittal reconstructions at 3 mm slice thickness were performed.  Omnipaque 350 65 mL was administered intravenously. Please note that spinal images were generated from the original CT abdomen and pelvis imaging. FINDINGS: CHEST: MEDIASTINUM: The heart is normal in size without pericardial effusion.  Central vascular structures opacify normally.  There is prominent fluid collection along the left aspect of the mediastinum measuring 14.4 x 9.8 x 3.8 cm suggesting large pericardial cyst. LUNGS/PLEURA: There is no pleural effusion or pneumothorax.  The airways are  patent. There is mild atelectasis adjacent to the left pericardial fluid collection.  There is small cystic focus right middle lobe.  There are no pulmonary nodules or masses. LYMPH NODES: Thoracic lymph nodes are not enlarged. ABDOMEN:  LIVER: Small hypodensity adjacent to the gallbladder suggests hepatic cyst.  BILE DUCTS: No intrahepatic or extrahepatic biliary ductal dilatation.  GALLBLADDER: The gallbladder is present and demonstrates small dependent stones versus sludge. STOMACH: No abnormalities identified.  PANCREAS: No masses or ductal dilatation.  SPLEEN: No splenomegaly or focal splenic lesion.  ADRENAL GLANDS: No thickening or nodules.  KIDNEYS AND URETERS: Kidneys are normal in size and location.  No renal or ureteral calculi.  Tiny hypodensities within the kidney suggest renal cyst.  PELVIS:  BLADDER: There is a small amount of gas in the anterior aspect of the bladder. Please correlate for recent instrumentation.  REPRODUCTIVE ORGANS: No abnormalities identified.  BOWEL: No abnormalities identified.  The appendix is normal.  VESSELS: No abnormalities identified.  Abdominal aorta is normal in caliber.  PERITONEUM/RETROPERITONEUM/LYMPH NODES: No free fluid.  No pneumoperitoneum. No lymphadenopathy.  ABDOMINAL WALL: No abnormalities identified. SOFT TISSUES: No abnormalities identified.  BONES: No acute fracture or aggressive osseous lesion. THORACIC SPINE: The alignment is anatomic.  There is no fracture or traumatic subluxation.  There is mild disc space narrowing.  There are small anterior marginal osteophytes in the lower thoracic spine extending T8-T10. The vertebral body heights are well maintained.  Disc spaces are preserved.  No significant central canal stenosis is demonstrated. The neural foramina are patent throughout.  The paravertebral soft tissues are within normal limits. LUMBAR SPINE: The alignment is anatomic.  There is no fracture or traumatic subluxation. There is superior endplate  concavity and Schmorl's node deformity at L1 with mild anterior wedging which appears chronic.  There is disc space narrowing and mild endplate degenerative changes.  There is mild to moderate facet arthrosis. At L2-3, there is mild annular disc bulge and mild facet arthrosis. There is mild central canal stenosis with mild to moderate bilateral neural foraminal stenosis. At L3-4 there is broad-based disc bulge and moderate facet arthrosis. There is moderate to advanced central canal stenosis and advanced right with moderate to advanced left neural foraminal stenosis. At L4-5 there is broad-based disc bulge and central disc protrusion with facet arthrosis contributing to advanced high-grade central canal stenosis as well as advanced bilateral neural foraminal stenosis. At L5-S1 is broad-based disc bulge and central disc protrusion with facet arthrosis.  There is at least moderate central canal stenosis with moderate to advanced right and mild left neural foraminal stenosis. The paravertebral soft tissues are within normal limits.      Chest: No acute traumatic injury. Large cystic focus along the left aspect of the mediastinum suggesting pericardial cyst.  No other acute cardiopulmonary disease. Abdomen: No acute traumatic injury and no acute inflammatory changes. Gallstones versus sludge within the gallbladder. Pelvis: No acute traumatic injury or inflammatory changes. Small amount gas in the bladder.  Please correlate for recent instrumentation. Thoracic spine: No acute fracture or malalignment. Lumbar spine: No acute fracture or malalignment.  Degenerative changes as described.  Findings are greater at L4-5.  Recommend follow-up MRI for further evaluation unless contraindicated. Signed by Saulo Castillo, DO    CT lumbar spine wo IV contrast    Result Date: 4/23/2024  STUDY: CT Abdomen, and Pelvis with IV Contrast, CT Thoracic Spine and Lumbar Spine without IV Contrast; 4/23/2024 10:23 AM INDICATION: Back pain  post fall. COMPARISON: 53/3/2022 XR abdomen. ACCESSION NUMBER(S): [id number], [id number], [id number] ORDERING CLINICIAN: CECI HUYNH TECHNIQUE: CT of the abdomen and pelvis was performed.  Contiguous axial images were obtained at 3 mm slice thickness through the abdomen and pelvis. Coronal and sagittal reconstructions at 3 mm slice thickness were performed.  Omnipaque 350 65 mL was administered intravenously. Please note that spinal images were generated from the original CT abdomen and pelvis imaging. FINDINGS: CHEST: MEDIASTINUM: The heart is normal in size without pericardial effusion.  Central vascular structures opacify normally.  There is prominent fluid collection along the left aspect of the mediastinum measuring 14.4 x 9.8 x 3.8 cm suggesting large pericardial cyst. LUNGS/PLEURA: There is no pleural effusion or pneumothorax.  The airways are patent. There is mild atelectasis adjacent to the left pericardial fluid collection.  There is small cystic focus right middle lobe.  There are no pulmonary nodules or masses. LYMPH NODES: Thoracic lymph nodes are not enlarged. ABDOMEN:  LIVER: Small hypodensity adjacent to the gallbladder suggests hepatic cyst.  BILE DUCTS: No intrahepatic or extrahepatic biliary ductal dilatation.  GALLBLADDER: The gallbladder is present and demonstrates small dependent stones versus sludge. STOMACH: No abnormalities identified.  PANCREAS: No masses or ductal dilatation.  SPLEEN: No splenomegaly or focal splenic lesion.  ADRENAL GLANDS: No thickening or nodules.  KIDNEYS AND URETERS: Kidneys are normal in size and location.  No renal or ureteral calculi.  Tiny hypodensities within the kidney suggest renal cyst.  PELVIS:  BLADDER: There is a small amount of gas in the anterior aspect of the bladder. Please correlate for recent instrumentation.  REPRODUCTIVE ORGANS: No abnormalities identified.  BOWEL: No abnormalities identified.  The appendix is normal.  VESSELS: No  abnormalities identified.  Abdominal aorta is normal in caliber.  PERITONEUM/RETROPERITONEUM/LYMPH NODES: No free fluid.  No pneumoperitoneum. No lymphadenopathy.  ABDOMINAL WALL: No abnormalities identified. SOFT TISSUES: No abnormalities identified.  BONES: No acute fracture or aggressive osseous lesion. THORACIC SPINE: The alignment is anatomic.  There is no fracture or traumatic subluxation.  There is mild disc space narrowing.  There are small anterior marginal osteophytes in the lower thoracic spine extending T8-T10. The vertebral body heights are well maintained.  Disc spaces are preserved.  No significant central canal stenosis is demonstrated. The neural foramina are patent throughout.  The paravertebral soft tissues are within normal limits. LUMBAR SPINE: The alignment is anatomic.  There is no fracture or traumatic subluxation. There is superior endplate concavity and Schmorl's node deformity at L1 with mild anterior wedging which appears chronic.  There is disc space narrowing and mild endplate degenerative changes.  There is mild to moderate facet arthrosis. At L2-3, there is mild annular disc bulge and mild facet arthrosis. There is mild central canal stenosis with mild to moderate bilateral neural foraminal stenosis. At L3-4 there is broad-based disc bulge and moderate facet arthrosis. There is moderate to advanced central canal stenosis and advanced right with moderate to advanced left neural foraminal stenosis. At L4-5 there is broad-based disc bulge and central disc protrusion with facet arthrosis contributing to advanced high-grade central canal stenosis as well as advanced bilateral neural foraminal stenosis. At L5-S1 is broad-based disc bulge and central disc protrusion with facet arthrosis.  There is at least moderate central canal stenosis with moderate to advanced right and mild left neural foraminal stenosis. The paravertebral soft tissues are within normal limits.      Chest: No acute  traumatic injury. Large cystic focus along the left aspect of the mediastinum suggesting pericardial cyst.  No other acute cardiopulmonary disease. Abdomen: No acute traumatic injury and no acute inflammatory changes. Gallstones versus sludge within the gallbladder. Pelvis: No acute traumatic injury or inflammatory changes. Small amount gas in the bladder.  Please correlate for recent instrumentation. Thoracic spine: No acute fracture or malalignment. Lumbar spine: No acute fracture or malalignment.  Degenerative changes as described.  Findings are greater at L4-5.  Recommend follow-up MRI for further evaluation unless contraindicated. Signed by Saulo Castillo DO    ECG 12 lead    Result Date: 4/23/2024  Normal sinus rhythm Right bundle branch block Abnormal ECG When compared with ECG of 01-MAY-2022 01:32, QRS voltage has increased Nonspecific T wave abnormality no longer evident in Inferior leads T wave amplitude has increased in Anterolateral leads    XR elbow right 3+ views    Result Date: 4/23/2024  STUDY: Elbow Radiographs; 4/23/2024 9:25 AM. INDICATION: Fall injury. COMPARISON: None Available. ACCESSION NUMBER(S): [id number] ORDERING CLINICIAN: CECI HUYNH TECHNIQUE:  Five views of the right elbow. FINDINGS:  There is no displaced fracture.  Underlying osteopenia.  Mild to moderate degenerative overhanging osteophytes along the radial head. Changes noted.  The alignment is anatomic.  No soft tissue abnormality is seen.  There is no joint effusion.    No acute osseous abnormality. Signed by Teo Ponce MD  .      Assessment/Plan   Principal Problem:    Rhabdomyolysis  Active Problems:    ETOH abuse    Fall    Closed head injury    Elevated lactic acid level    Neck pain    Alcohol withdrawal seizure, with unspecified complication (Multi)    Closed nondisplaced fracture of sixth cervical vertebra (Multi)    Perseverating  - unclear etiology at this time; possibly 2/2 ETOH withdrawal, anxiety,  "medications.  - Will monitor  - CT head without contrast  - Continue CIWA protocol with thiamine, MVI, folic acid  - Continue zonisamide   - Continue supportive care    Case/plan discussed and pt seen with Dr. Quinones.       I spent minutes in the professional and overall care of this patient.      MAXIMILIANO Rizzo-CNP    I saw and evaluated the patient.  I obtained the key portions of the history and examination.  I reviewed the residents/APNs note, discussed the patient and supervised treatment plan formulation.    Briefly, this is a 74 year old female with alcoholism presenting after a fall. She was found to have spinal cord compression and underwent surgery yesterday.  Today, she was notably confused which prompted a neurology consult.  CIWA Scores are notably rising.     Objective:  /84   Pulse 95   Temp 37 °C (98.6 °F)   Resp 18   Ht 1.651 m (5' 5\")   Wt 54.9 kg (121 lb 0.5 oz)   LMP  (LMP Unknown)   SpO2 97%   BMI 20.14 kg/m²     Gen: NAD  Neuro:  --HIF: A&O X 2, naming 1/2 objects, repetition intact; some perseveration noted,  --CN:  PERRLA, EOMI, VFF, no visible facial asymmetry, facial sensation intact, no tongue or palatal deviation, SCM intact  --Motor: Moves all 4 extremities equally; no focal deficits  --Sensory: Intact to light touch, intact to pinprick  --Reflex: 2+ symmetric, toes down  --Cerebellum: FTN  intact  --Gait: Deferred     Assessment:   Encephalopathy  - likely secondary to alcohol withdrawal  - repeat head CT to rule out intracranial abnormality (suspicion remains low)  - mgmt of alcohol withdrawal per psychiatry  - thiamine, MVT, folic acid    Dylon Quinones MD  [facility]  Department of Neurology    "

## 2024-04-25 NOTE — CARE PLAN
The patient's goals for the shift include    Problem: Skin  Goal: Participates in plan/prevention/treatment measures  Outcome: Progressing     Problem: Skin  Goal: Prevent/manage excess moisture  Outcome: Progressing     Problem: Skin  Goal: Prevent/minimize sheer/friction injuries  Outcome: Progressing     Problem: Skin  Goal: Promote/optimize nutrition  Outcome: Progressing     Problem: Pain  Goal: Takes deep breaths with improved pain control throughout the shift  Outcome: Progressing     Problem: Skin  Goal: Decreased wound size/increased tissue granulation at next dressing change  Outcome: Progressing       The clinical goals for the shift include See care plan.

## 2024-04-25 NOTE — CONSULTS
"Reason for consult:  Alcoholism    History Of Present Illness  Katya Sandhu is a 74 y.o. female presenting with fall and fracture of spine, intoxicated. Pt is alert but disoriented  Pt has been perseverating during the interview, repeating the same response and questions  Pt admit to drinking alcohol but unable to tell the severity of use.   Pt is currently going through withdrawal  Needing Ativan 4 mg so far today - as per [facility] protocol every 4 hr  Pt has been hallucinating and confused  Unable to elicit any meaningful information  Neurology consulted for MRI    Unable to elicit any past psych history     Past Medical History  She has a past medical history of Brain lesion, Depression, ETOH abuse, HLD (hyperlipidemia), Hypertension, and Hypothyroidism.    Surgical History  She has a past surgical history that includes CT angio head w and wo IV contrast (04/24/2022); CT angio neck (04/24/2022); Brain surgery; Tonsillectomy; and Foot surgery.     Social History  She reports that she has quit smoking. Her smoking use included cigarettes. She has never used smokeless tobacco. She reports current alcohol use of about 3.0 standard drinks of alcohol per week. She reports that she does not use drugs.     Allergies  Penicillins    Review of Systems    Psychiatric ROS - Adult  Unable to elicit    Mental Status Exam  General: alert and disoriented  Appearance: stated age  Attitude: not cooperative  Behavior: agitated  Motor Activity:   Speech: incoherent  Mood: irritable  Affect: labile  Thought Process: concrete  Thought Content: unable to elicit  Thought Perception: VH ??  Cognition: unable to assess  Insight: poor  Judgement: poor    Psychiatric Risk Assessment  High risk for agitation    Last Recorded Vitals  Blood pressure 170/84, pulse 95, temperature 37 °C (98.6 °F), resp. rate 18, height 1.651 m (5' 5\"), weight 54.9 kg (121 lb 0.5 oz), SpO2 97%.    Relevant Results  Results for orders placed or performed " during the hospital encounter of 04/23/24 (from the past 96 hour(s))   CBC and Auto Differential   Result Value Ref Range    WBC 12.3 (H) 4.4 - 11.3 x10*3/uL    nRBC 0.0 0.0 - 0.0 /100 WBCs    RBC 3.94 (L) 4.00 - 5.20 x10*6/uL    Hemoglobin 12.9 12.0 - 16.0 g/dL    Hematocrit 39.3 36.0 - 46.0 %     80 - 100 fL    MCH 32.7 26.0 - 34.0 pg    MCHC 32.8 32.0 - 36.0 g/dL    RDW 13.6 11.5 - 14.5 %    Platelets 272 150 - 450 x10*3/uL    Neutrophils % 83.0 40.0 - 80.0 %    Immature Granulocytes %, Automated 0.2 0.0 - 0.9 %    Lymphocytes % 7.3 13.0 - 44.0 %    Monocytes % 9.1 2.0 - 10.0 %    Eosinophils % 0.0 0.0 - 6.0 %    Basophils % 0.4 0.0 - 2.0 %    Neutrophils Absolute 10.18 (H) 1.60 - 5.50 x10*3/uL    Immature Granulocytes Absolute, Automated 0.03 0.00 - 0.50 x10*3/uL    Lymphocytes Absolute 0.89 0.80 - 3.00 x10*3/uL    Monocytes Absolute 1.12 (H) 0.05 - 0.80 x10*3/uL    Eosinophils Absolute 0.00 0.00 - 0.40 x10*3/uL    Basophils Absolute 0.05 0.00 - 0.10 x10*3/uL   Comprehensive metabolic panel   Result Value Ref Range    Glucose 128 (H) 74 - 99 mg/dL    Sodium 141 136 - 145 mmol/L    Potassium 4.1 3.5 - 5.3 mmol/L    Chloride 106 98 - 107 mmol/L    Bicarbonate 21 21 - 32 mmol/L    Anion Gap 18 10 - 20 mmol/L    Urea Nitrogen 21 6 - 23 mg/dL    Creatinine 0.57 0.50 - 1.05 mg/dL    eGFR >90 >60 mL/min/1.73m*2    Calcium 9.0 8.6 - 10.3 mg/dL    Albumin 4.7 3.4 - 5.0 g/dL    Alkaline Phosphatase 70 33 - 136 U/L    Total Protein 6.4 6.4 - 8.2 g/dL    AST 46 (H) 9 - 39 U/L    Bilirubin, Total 0.8 0.0 - 1.2 mg/dL    ALT 33 7 - 45 U/L   Lactate   Result Value Ref Range    Lactate 2.4 (H) 0.4 - 2.0 mmol/L   Protime-INR   Result Value Ref Range    Protime 11.3 9.8 - 12.8 seconds    INR 1.0 0.9 - 1.1   Acute Toxicology Panel, Blood   Result Value Ref Range    Acetaminophen 17.2 10.0 - 30.0 ug/mL    Salicylate  <3 4 - 20 mg/dL    Alcohol 89 (H) <=10 mg/dL   Cardiac Enzymes - CPK   Result Value Ref Range    Creatine  Kinase 893 (H) 0 - 215 U/L   ECG 12 lead   Result Value Ref Range    Ventricular Rate 80 BPM    Atrial Rate 80 BPM    ND Interval 176 ms    QRS Duration 138 ms    QT Interval 420 ms    QTC Calculation(Bazett) 484 ms    P Axis 79 degrees    R Axis 53 degrees    T Axis 77 degrees    QRS Count 13 beats    Q Onset 222 ms    P Onset 134 ms    P Offset 183 ms    T Offset 432 ms    QTC Fredericia 462 ms   Urinalysis with Reflex Culture and Microscopic   Result Value Ref Range    Color, Urine Yellow Straw, Yellow    Appearance, Urine Clear Clear    Specific Gravity, Urine >1.030 (N) 1.005 - 1.035    pH, Urine 6.0 5.0, 5.5, 6.0, 6.5, 7.0, 7.5, 8.0    Protein, Urine NEGATIVE NEGATIVE, 10 (TRACE) mg/dL    Glucose, Urine NEGATIVE NEGATIVE mg/dL    Blood, Urine NEGATIVE NEGATIVE    Ketones, Urine 10 (1+) (A) NEGATIVE mg/dL    Bilirubin, Urine NEGATIVE NEGATIVE    Urobilinogen, Urine NORM NORM mg/dL    Nitrite, Urine NEGATIVE NEGATIVE    Leukocyte Esterase, Urine 75 Robert/µL (A) NEGATIVE   Microscopic Only, Urine   Result Value Ref Range    WBC, Urine 11-20 (A) 1-5, NONE /HPF    RBC, Urine 3-5 NONE, 1-2, 3-5 /HPF   Urine Culture    Specimen: Clean Catch/Voided; Urine   Result Value Ref Range    Urine Culture No significant growth    Lactate   Result Value Ref Range    Lactate 2.8 (H) 0.4 - 2.0 mmol/L   Cardiac Enzymes - CPK   Result Value Ref Range    Creatine Kinase 865 (H) 0 - 215 U/L   Lactate   Result Value Ref Range    Lactate 1.9 0.4 - 2.0 mmol/L   Creatine Kinase   Result Value Ref Range    Creatine Kinase 841 (H) 0 - 215 U/L   Troponin I, High Sensitivity   Result Value Ref Range    Troponin I, High Sensitivity 9 0 - 13 ng/L   Ammonia   Result Value Ref Range    Ammonia 28 16 - 53 umol/L   Lavender Top   Result Value Ref Range    Extra Tube Hold for add-ons.    SST TOP   Result Value Ref Range    Extra Tube Hold for add-ons.    Creatine Kinase   Result Value Ref Range    Creatine Kinase 708 (H) 0 - 215 U/L   CBC   Result  Value Ref Range    WBC 8.1 4.4 - 11.3 x10*3/uL    nRBC 0.0 0.0 - 0.0 /100 WBCs    RBC 3.65 (L) 4.00 - 5.20 x10*6/uL    Hemoglobin 12.2 12.0 - 16.0 g/dL    Hematocrit 35.9 (L) 36.0 - 46.0 %    MCV 98 80 - 100 fL    MCH 33.4 26.0 - 34.0 pg    MCHC 34.0 32.0 - 36.0 g/dL    RDW 13.4 11.5 - 14.5 %    Platelets 249 150 - 450 x10*3/uL   Basic metabolic panel   Result Value Ref Range    Glucose 153 (H) 74 - 99 mg/dL    Sodium 135 (L) 136 - 145 mmol/L    Potassium 4.0 3.5 - 5.3 mmol/L    Chloride 105 98 - 107 mmol/L    Bicarbonate 20 (L) 21 - 32 mmol/L    Anion Gap 14 10 - 20 mmol/L    Urea Nitrogen 19 6 - 23 mg/dL    Creatinine 0.46 (L) 0.50 - 1.05 mg/dL    eGFR >90 >60 mL/min/1.73m*2    Calcium 8.8 8.6 - 10.3 mg/dL   Creatine Kinase   Result Value Ref Range    Creatine Kinase 482 (H) 0 - 215 U/L   Phosphorus   Result Value Ref Range    Phosphorus 3.7 2.5 - 4.9 mg/dL   Magnesium   Result Value Ref Range    Magnesium 1.71 1.60 - 2.40 mg/dL   Magnesium   Result Value Ref Range    Magnesium 2.14 1.60 - 2.40 mg/dL   Basic Metabolic Panel   Result Value Ref Range    Glucose 126 (H) 74 - 99 mg/dL    Sodium 137 136 - 145 mmol/L    Potassium 3.9 3.5 - 5.3 mmol/L    Chloride 107 98 - 107 mmol/L    Bicarbonate 22 21 - 32 mmol/L    Anion Gap 12 10 - 20 mmol/L    Urea Nitrogen 19 6 - 23 mg/dL    Creatinine 0.65 0.50 - 1.05 mg/dL    eGFR >90 >60 mL/min/1.73m*2    Calcium 8.3 (L) 8.6 - 10.3 mg/dL   Creatine Kinase   Result Value Ref Range    Creatine Kinase 339 (H) 0 - 215 U/L   Lavender Top   Result Value Ref Range    Extra Tube Hold for add-ons.    CBC   Result Value Ref Range    WBC 14.0 (H) 4.4 - 11.3 x10*3/uL    nRBC 0.0 0.0 - 0.0 /100 WBCs    RBC 3.48 (L) 4.00 - 5.20 x10*6/uL    Hemoglobin 11.5 (L) 12.0 - 16.0 g/dL    Hematocrit 34.1 (L) 36.0 - 46.0 %    MCV 98 80 - 100 fL    MCH 33.0 26.0 - 34.0 pg    MCHC 33.7 32.0 - 36.0 g/dL    RDW 13.4 11.5 - 14.5 %    Platelets 273 150 - 450 x10*3/uL     FL fluoro images no charge    Result  Date: 4/24/2024  These images are not reportable by radiology and will not be interpreted by  Radiologists.    CT head wo IV contrast    Addendum Date: 4/24/2024    Interpreted By:  Guerrero Zurita, ADDENDUM: Especially with the benefit of subsequently performed MRI, review of the preceding CT shows interval new splaying of the posterior elements at C6-7; probably acute fragmentation of the lower tip of the left facet and near perching of facets also on the left. Focal kyphosis at same level. Cord impingement confirmed on subsequent MRI   Signed by: Guerrero Zurita 4/24/2024 3:00 PM   -------- ORIGINAL REPORT -------- Dictation workstation:   [id number]    Result Date: 4/24/2024  Interpreted By:  Guerrero Zurita, STUDY: CT HEAD WO IV CONTRAST; CT CERVICAL SPINE WO IV CONTRAST;  4/23/2024 10:20 am   INDICATION: Signs/Symptoms:Fall head injury.   COMPARISON: CT brain and cervical spine both from 28 December 2023   ACCESSION NUMBER(S): [id number]; [id number]   ORDERING CLINICIAN: CECI HUYNH   TECHNIQUE: CT of the brain from the skull vertex to the skull base, without intravenous contrast   CT cervical spine from the craniocervical junction through the cervicothoracic junction without IV contrast, including sagittal and coronal reformatted images   FINDINGS: CT BRAIN   TRAUMA-RELATED   Brain Injury (BIG) guidelines CT values:   Skull fracture: No SDH (subdural hematoma): None detected EDH (epidural hemtoma): None detected IPH (intraparenchymal hemorrhage): None detected SAH (subarachnoid hemorrhage): None detected IVH (intraventricular hemorrhage): None detected   Reference: Bert CAMPOS, John Paul RS, Madelin DENSON, et al. The BIG (brain injury guidelines) project: defining the management of traumatic brain injury by acute care surgeons. J Trauma Acute Care Surg. 2014;76:880m208.   OTHER   ACUTE INTRACRANIAL MASS EFFECT:  Negative   CT EVIDENCE OF ACUTE / SUBACUTE TERRITORIAL ISCHEMIA:  Negative   VENTRICLES:  Normal caliber and  configuration   OTHER BRAIN FINDINGS:  No additional findings to note   INCLUDED PARANASAL SINUSES: All clear   INCLUDED MASTOID AIR CELLS: All clear   SKULL:  No lytic or blastic lesion   EXTRACRANIAL SOFT TISSUES:  Large right paramidline scalp hematoma. The skin surface over the area of hematoma is cut out of the field of view. Field-of-view contains no subcutaneous gas or subcutaneous radiopaque foreign body   -------   CT CERVICAL SPINE   COUNTING REFERENCE: Craniocervical junction   CRANIOCERVICAL JUNCTION:  Intact   CERVICAL ALIGNMENT:  Anatomic; no acute traumatic alignment abnormality such as subluxation   ACUTE FRACTURE: Negative   AGGRESSIVE OSSEOUS LESION: Negative   BONY CANAL AND FORAMINA:  No significant interval change from prior. This exam was interpreted on a time sensitive basis in the context of trauma, not for radiculopathy as an outpatient   PARASPINAL SOFT TISSUES:  No large acute hematoma or other acute posttraumatic finding   OTHER INCLUDED STRUCTURES:  No acute or contributory soft tissue abnormality in the other cervical and upper thoracic soft tissues       LARGE SCALP HEMATOMA. SKIN SURFACE OVER THE SCALP HEMATOMA IS NOT INCLUDED IN THE FIELD OF VIEW. FIELD-OF-VIEW DOES NOT INCLUDE ANY SUBCUTANEOUS GAS OR SUBCUTANEOUS RADIOPAQUE FOREIGN BODY   NO ACUTE INTRACRANIAL PROCESS. SKULL INTACT   NO ACUTE FRACTURE OR SUBLUXATION IN THE CERVICAL SPINE   THIS REPORT SERVES AS THE DIAGNOSTIC INTERPRETATION FOR TWO EXAMS PERFORMED CONCURRENTLY: CT BRAIN WITHOUT IV CONTRAST AND CT CERVICAL SPINE WITHOUT IV CONTRAST   MACRO: None   Signed by: Guerrero Zurita 4/23/2024 10:42 AM Dictation workstation:   [id number]    CT cervical spine wo IV contrast    Addendum Date: 4/24/2024    Interpreted By:  Guerrero Zurita, ADDENDUM: Especially with the benefit of subsequently performed MRI, review of the preceding CT shows interval new splaying of the posterior elements at C6-7; probably acute fragmentation of the  lower tip of the left facet and near perching of facets also on the left. Focal kyphosis at same level. Cord impingement confirmed on subsequent MRI   Signed by: Guerrero Zurita 4/24/2024 3:00 PM   -------- ORIGINAL REPORT -------- Dictation workstation:   [id number]    Result Date: 4/24/2024  Interpreted By:  Guerrero Zurita, STUDY: CT HEAD WO IV CONTRAST; CT CERVICAL SPINE WO IV CONTRAST;  4/23/2024 10:20 am   INDICATION: Signs/Symptoms:Fall head injury.   COMPARISON: CT brain and cervical spine both from 28 December 2023   ACCESSION NUMBER(S): [id number]; [id number]   ORDERING CLINICIAN: CECI HUYNH   TECHNIQUE: CT of the brain from the skull vertex to the skull base, without intravenous contrast   CT cervical spine from the craniocervical junction through the cervicothoracic junction without IV contrast, including sagittal and coronal reformatted images   FINDINGS: CT BRAIN   TRAUMA-RELATED   Brain Injury (BIG) guidelines CT values:   Skull fracture: No SDH (subdural hematoma): None detected EDH (epidural hemtoma): None detected IPH (intraparenchymal hemorrhage): None detected SAH (subarachnoid hemorrhage): None detected IVH (intraventricular hemorrhage): None detected   Reference: Bert CAMPOS, John Paul RS, Madelin M, et al. The BIG (brain injury guidelines) project: defining the management of traumatic brain injury by acute care surgeons. J Trauma Acute Care Surg. 2014;76:109d869.   OTHER   ACUTE INTRACRANIAL MASS EFFECT:  Negative   CT EVIDENCE OF ACUTE / SUBACUTE TERRITORIAL ISCHEMIA:  Negative   VENTRICLES:  Normal caliber and configuration   OTHER BRAIN FINDINGS:  No additional findings to note   INCLUDED PARANASAL SINUSES: All clear   INCLUDED MASTOID AIR CELLS: All clear   SKULL:  No lytic or blastic lesion   EXTRACRANIAL SOFT TISSUES:  Large right paramidline scalp hematoma. The skin surface over the area of hematoma is cut out of the field of view. Field-of-view contains no subcutaneous gas or subcutaneous  radiopaque foreign body   -------   CT CERVICAL SPINE   COUNTING REFERENCE: Craniocervical junction   CRANIOCERVICAL JUNCTION:  Intact   CERVICAL ALIGNMENT:  Anatomic; no acute traumatic alignment abnormality such as subluxation   ACUTE FRACTURE: Negative   AGGRESSIVE OSSEOUS LESION: Negative   BONY CANAL AND FORAMINA:  No significant interval change from prior. This exam was interpreted on a time sensitive basis in the context of trauma, not for radiculopathy as an outpatient   PARASPINAL SOFT TISSUES:  No large acute hematoma or other acute posttraumatic finding   OTHER INCLUDED STRUCTURES:  No acute or contributory soft tissue abnormality in the other cervical and upper thoracic soft tissues       LARGE SCALP HEMATOMA. SKIN SURFACE OVER THE SCALP HEMATOMA IS NOT INCLUDED IN THE FIELD OF VIEW. FIELD-OF-VIEW DOES NOT INCLUDE ANY SUBCUTANEOUS GAS OR SUBCUTANEOUS RADIOPAQUE FOREIGN BODY   NO ACUTE INTRACRANIAL PROCESS. SKULL INTACT   NO ACUTE FRACTURE OR SUBLUXATION IN THE CERVICAL SPINE   THIS REPORT SERVES AS THE DIAGNOSTIC INTERPRETATION FOR TWO EXAMS PERFORMED CONCURRENTLY: CT BRAIN WITHOUT IV CONTRAST AND CT CERVICAL SPINE WITHOUT IV CONTRAST   MACRO: None   Signed by: Guerrero Zurita 4/23/2024 10:42 AM Dictation workstation:   [id number]    MR cervical spine wo IV contrast    Result Date: 4/23/2024  Interpreted By:  Lisa James, STUDY: MR CERVICAL SPINE WO IV CONTRAST;  4/23/2024 7:47 pm   INDICATION: Signs/Symptoms:Neck pain. S/P fall down flight of steps.   COMPARISON: CT cervical spine 04/23/2024   ACCESSION NUMBER(S): [id number]   ORDERING CLINICIAN: ANA LAURA KEY   TECHNIQUE: Sagittal T1, T2, STIR, axial T1 and axial T2 weighted images were acquired through the cervical spine.   FINDINGS: There is destruction of the ligamentum flavum at the level of C6-C7 best identified on sagittal T2 imaging, with widening of the interspinous space and mild focal C6-C7 kyphosis. There is facet joint edema and  slightly perched facets noted bilaterally. There is anterior buckling of the cortex of the anterior superior C7 vertebral body and minimal prevertebral edema noted. There is minimal irregularity of the posterior longitudinal ligament at C6-C7. Intervertebral disc space edema is noted. Findings results in severe canal stenosis with cord deformity at C6-C7. There is slightly edematous appearance of the cord superior and inferior to this level with mild cord T2/STIR hyperintense signal dorsal to the C7 vertebral body. There is a small dorsal epidural collection which extends from C6-C7 through T1-T2 measuring up to 2 mm (AP). There is associated diffuse paraspinal edema which extends predominately from the level of C6-C7 cranially to the suboccipital region   Mild superior endplate edema noted at T1 without significant vertebral body height loss. Vertebral body and facet joint alignment is otherwise maintained. Intervertebral disc spaces are otherwise maintained.   C1-C2: The cervicomedullary junction appears unremarkable. There is no central canal stenosis.   C2-C3: There is no significant central canal or neural foraminal stenosis.   C3-C4: Mild right foraminal narrowing, otherwise there is no significant central canal or neural foraminal stenosis.   C4-C5: Minimal canal and right foraminal narrowing present. The left foramen is patent.   C5-C6: Mild canal and moderate to severe right foraminal narrowing. No significant left foraminal narrowing.   C6-C7: Canal stenosis an epidural collection as well as additional findings as detailed above.   C7-T1: For is epidural collection. There is no significant central canal or neural foraminal stenosis.       There is traumatic kyphosis at C6-C7 with persistence and disruption of the ligamentum flavum. There is irregularity of the C6-C7 posterior longitudinal ligament with buckling and impaction anteriorly of the anterior longitudinal ligament and C7 vertebral body. Findings  result in severe canal stenosis with secondary cord edema and trace epidural hematoma. Correlation for two-column spinal injury also recommended. Neuro surgical consult recommended.   Endplate edema at T1 concerning for a nondisplaced superior endplate fracture.   Extensive paraspinal edema with probable strain/sprain and mild prevertebral edema.   MACRO: Lisa James discussed the significance and urgency of this critical finding by telephone with  ANA LAURA SHAHID on 4/23/2024 at 9:58 pm.  (**-RCF-**) Findings:  See findings.   Signed by: Lisa James 4/23/2024 10:02 PM Dictation workstation:   [id number]    CT chest abdomen pelvis w IV contrast    Result Date: 4/23/2024  STUDY: CT Abdomen, and Pelvis with IV Contrast, CT Thoracic Spine and Lumbar Spine without IV Contrast; 4/23/2024 10:23 AM INDICATION: Back pain post fall. COMPARISON: 53/3/2022 XR abdomen. ACCESSION NUMBER(S): [id number], [id number], [id number] ORDERING CLINICIAN: CECI HUYNH TECHNIQUE: CT of the abdomen and pelvis was performed.  Contiguous axial images were obtained at 3 mm slice thickness through the abdomen and pelvis. Coronal and sagittal reconstructions at 3 mm slice thickness were performed.  Omnipaque 350 65 mL was administered intravenously. Please note that spinal images were generated from the original CT abdomen and pelvis imaging. FINDINGS: CHEST: MEDIASTINUM: The heart is normal in size without pericardial effusion.  Central vascular structures opacify normally.  There is prominent fluid collection along the left aspect of the mediastinum measuring 14.4 x 9.8 x 3.8 cm suggesting large pericardial cyst. LUNGS/PLEURA: There is no pleural effusion or pneumothorax.  The airways are patent. There is mild atelectasis adjacent to the left pericardial fluid collection.  There is small cystic focus right middle lobe.  There are no pulmonary nodules or masses. LYMPH NODES: Thoracic lymph nodes are not enlarged. ABDOMEN:  LIVER:  Small hypodensity adjacent to the gallbladder suggests hepatic cyst.  BILE DUCTS: No intrahepatic or extrahepatic biliary ductal dilatation.  GALLBLADDER: The gallbladder is present and demonstrates small dependent stones versus sludge. STOMACH: No abnormalities identified.  PANCREAS: No masses or ductal dilatation.  SPLEEN: No splenomegaly or focal splenic lesion.  ADRENAL GLANDS: No thickening or nodules.  KIDNEYS AND URETERS: Kidneys are normal in size and location.  No renal or ureteral calculi.  Tiny hypodensities within the kidney suggest renal cyst.  PELVIS:  BLADDER: There is a small amount of gas in the anterior aspect of the bladder. Please correlate for recent instrumentation.  REPRODUCTIVE ORGANS: No abnormalities identified.  BOWEL: No abnormalities identified.  The appendix is normal.  VESSELS: No abnormalities identified.  Abdominal aorta is normal in caliber.  PERITONEUM/RETROPERITONEUM/LYMPH NODES: No free fluid.  No pneumoperitoneum. No lymphadenopathy.  ABDOMINAL WALL: No abnormalities identified. SOFT TISSUES: No abnormalities identified.  BONES: No acute fracture or aggressive osseous lesion. THORACIC SPINE: The alignment is anatomic.  There is no fracture or traumatic subluxation.  There is mild disc space narrowing.  There are small anterior marginal osteophytes in the lower thoracic spine extending T8-T10. The vertebral body heights are well maintained.  Disc spaces are preserved.  No significant central canal stenosis is demonstrated. The neural foramina are patent throughout.  The paravertebral soft tissues are within normal limits. LUMBAR SPINE: The alignment is anatomic.  There is no fracture or traumatic subluxation. There is superior endplate concavity and Schmorl's node deformity at L1 with mild anterior wedging which appears chronic.  There is disc space narrowing and mild endplate degenerative changes.  There is mild to moderate facet arthrosis. At L2-3, there is mild annular disc  bulge and mild facet arthrosis. There is mild central canal stenosis with mild to moderate bilateral neural foraminal stenosis. At L3-4 there is broad-based disc bulge and moderate facet arthrosis. There is moderate to advanced central canal stenosis and advanced right with moderate to advanced left neural foraminal stenosis. At L4-5 there is broad-based disc bulge and central disc protrusion with facet arthrosis contributing to advanced high-grade central canal stenosis as well as advanced bilateral neural foraminal stenosis. At L5-S1 is broad-based disc bulge and central disc protrusion with facet arthrosis.  There is at least moderate central canal stenosis with moderate to advanced right and mild left neural foraminal stenosis. The paravertebral soft tissues are within normal limits.      Chest: No acute traumatic injury. Large cystic focus along the left aspect of the mediastinum suggesting pericardial cyst.  No other acute cardiopulmonary disease. Abdomen: No acute traumatic injury and no acute inflammatory changes. Gallstones versus sludge within the gallbladder. Pelvis: No acute traumatic injury or inflammatory changes. Small amount gas in the bladder.  Please correlate for recent instrumentation. Thoracic spine: No acute fracture or malalignment. Lumbar spine: No acute fracture or malalignment.  Degenerative changes as described.  Findings are greater at L4-5.  Recommend follow-up MRI for further evaluation unless contraindicated. Signed by Saulo Castillo, DO    CT thoracic spine wo IV contrast    Result Date: 4/23/2024  STUDY: CT Abdomen, and Pelvis with IV Contrast, CT Thoracic Spine and Lumbar Spine without IV Contrast; 4/23/2024 10:23 AM INDICATION: Back pain post fall. COMPARISON: 53/3/2022 XR abdomen. ACCESSION NUMBER(S): [id number], [id number], [id number] ORDERING CLINICIAN: CECI HUYNH TECHNIQUE: CT of the abdomen and pelvis was performed.  Contiguous axial images were obtained at 3 mm  slice thickness through the abdomen and pelvis. Coronal and sagittal reconstructions at 3 mm slice thickness were performed.  Omnipaque 350 65 mL was administered intravenously. Please note that spinal images were generated from the original CT abdomen and pelvis imaging. FINDINGS: CHEST: MEDIASTINUM: The heart is normal in size without pericardial effusion.  Central vascular structures opacify normally.  There is prominent fluid collection along the left aspect of the mediastinum measuring 14.4 x 9.8 x 3.8 cm suggesting large pericardial cyst. LUNGS/PLEURA: There is no pleural effusion or pneumothorax.  The airways are patent. There is mild atelectasis adjacent to the left pericardial fluid collection.  There is small cystic focus right middle lobe.  There are no pulmonary nodules or masses. LYMPH NODES: Thoracic lymph nodes are not enlarged. ABDOMEN:  LIVER: Small hypodensity adjacent to the gallbladder suggests hepatic cyst.  BILE DUCTS: No intrahepatic or extrahepatic biliary ductal dilatation.  GALLBLADDER: The gallbladder is present and demonstrates small dependent stones versus sludge. STOMACH: No abnormalities identified.  PANCREAS: No masses or ductal dilatation.  SPLEEN: No splenomegaly or focal splenic lesion.  ADRENAL GLANDS: No thickening or nodules.  KIDNEYS AND URETERS: Kidneys are normal in size and location.  No renal or ureteral calculi.  Tiny hypodensities within the kidney suggest renal cyst.  PELVIS:  BLADDER: There is a small amount of gas in the anterior aspect of the bladder. Please correlate for recent instrumentation.  REPRODUCTIVE ORGANS: No abnormalities identified.  BOWEL: No abnormalities identified.  The appendix is normal.  VESSELS: No abnormalities identified.  Abdominal aorta is normal in caliber.  PERITONEUM/RETROPERITONEUM/LYMPH NODES: No free fluid.  No pneumoperitoneum. No lymphadenopathy.  ABDOMINAL WALL: No abnormalities identified. SOFT TISSUES: No abnormalities identified.   BONES: No acute fracture or aggressive osseous lesion. THORACIC SPINE: The alignment is anatomic.  There is no fracture or traumatic subluxation.  There is mild disc space narrowing.  There are small anterior marginal osteophytes in the lower thoracic spine extending T8-T10. The vertebral body heights are well maintained.  Disc spaces are preserved.  No significant central canal stenosis is demonstrated. The neural foramina are patent throughout.  The paravertebral soft tissues are within normal limits. LUMBAR SPINE: The alignment is anatomic.  There is no fracture or traumatic subluxation. There is superior endplate concavity and Schmorl's node deformity at L1 with mild anterior wedging which appears chronic.  There is disc space narrowing and mild endplate degenerative changes.  There is mild to moderate facet arthrosis. At L2-3, there is mild annular disc bulge and mild facet arthrosis. There is mild central canal stenosis with mild to moderate bilateral neural foraminal stenosis. At L3-4 there is broad-based disc bulge and moderate facet arthrosis. There is moderate to advanced central canal stenosis and advanced right with moderate to advanced left neural foraminal stenosis. At L4-5 there is broad-based disc bulge and central disc protrusion with facet arthrosis contributing to advanced high-grade central canal stenosis as well as advanced bilateral neural foraminal stenosis. At L5-S1 is broad-based disc bulge and central disc protrusion with facet arthrosis.  There is at least moderate central canal stenosis with moderate to advanced right and mild left neural foraminal stenosis. The paravertebral soft tissues are within normal limits.      Chest: No acute traumatic injury. Large cystic focus along the left aspect of the mediastinum suggesting pericardial cyst.  No other acute cardiopulmonary disease. Abdomen: No acute traumatic injury and no acute inflammatory changes. Gallstones versus sludge within the  gallbladder. Pelvis: No acute traumatic injury or inflammatory changes. Small amount gas in the bladder.  Please correlate for recent instrumentation. Thoracic spine: No acute fracture or malalignment. Lumbar spine: No acute fracture or malalignment.  Degenerative changes as described.  Findings are greater at L4-5.  Recommend follow-up MRI for further evaluation unless contraindicated. Signed by Saulo Castillo, DO    CT lumbar spine wo IV contrast    Result Date: 4/23/2024  STUDY: CT Abdomen, and Pelvis with IV Contrast, CT Thoracic Spine and Lumbar Spine without IV Contrast; 4/23/2024 10:23 AM INDICATION: Back pain post fall. COMPARISON: 53/3/2022 XR abdomen. ACCESSION NUMBER(S): [id number], [id number], [id number] ORDERING CLINICIAN: CECI HUYNH TECHNIQUE: CT of the abdomen and pelvis was performed.  Contiguous axial images were obtained at 3 mm slice thickness through the abdomen and pelvis. Coronal and sagittal reconstructions at 3 mm slice thickness were performed.  Omnipaque 350 65 mL was administered intravenously. Please note that spinal images were generated from the original CT abdomen and pelvis imaging. FINDINGS: CHEST: MEDIASTINUM: The heart is normal in size without pericardial effusion.  Central vascular structures opacify normally.  There is prominent fluid collection along the left aspect of the mediastinum measuring 14.4 x 9.8 x 3.8 cm suggesting large pericardial cyst. LUNGS/PLEURA: There is no pleural effusion or pneumothorax.  The airways are patent. There is mild atelectasis adjacent to the left pericardial fluid collection.  There is small cystic focus right middle lobe.  There are no pulmonary nodules or masses. LYMPH NODES: Thoracic lymph nodes are not enlarged. ABDOMEN:  LIVER: Small hypodensity adjacent to the gallbladder suggests hepatic cyst.  BILE DUCTS: No intrahepatic or extrahepatic biliary ductal dilatation.  GALLBLADDER: The gallbladder is present and demonstrates small  dependent stones versus sludge. STOMACH: No abnormalities identified.  PANCREAS: No masses or ductal dilatation.  SPLEEN: No splenomegaly or focal splenic lesion.  ADRENAL GLANDS: No thickening or nodules.  KIDNEYS AND URETERS: Kidneys are normal in size and location.  No renal or ureteral calculi.  Tiny hypodensities within the kidney suggest renal cyst.  PELVIS:  BLADDER: There is a small amount of gas in the anterior aspect of the bladder. Please correlate for recent instrumentation.  REPRODUCTIVE ORGANS: No abnormalities identified.  BOWEL: No abnormalities identified.  The appendix is normal.  VESSELS: No abnormalities identified.  Abdominal aorta is normal in caliber.  PERITONEUM/RETROPERITONEUM/LYMPH NODES: No free fluid.  No pneumoperitoneum. No lymphadenopathy.  ABDOMINAL WALL: No abnormalities identified. SOFT TISSUES: No abnormalities identified.  BONES: No acute fracture or aggressive osseous lesion. THORACIC SPINE: The alignment is anatomic.  There is no fracture or traumatic subluxation.  There is mild disc space narrowing.  There are small anterior marginal osteophytes in the lower thoracic spine extending T8-T10. The vertebral body heights are well maintained.  Disc spaces are preserved.  No significant central canal stenosis is demonstrated. The neural foramina are patent throughout.  The paravertebral soft tissues are within normal limits. LUMBAR SPINE: The alignment is anatomic.  There is no fracture or traumatic subluxation. There is superior endplate concavity and Schmorl's node deformity at L1 with mild anterior wedging which appears chronic.  There is disc space narrowing and mild endplate degenerative changes.  There is mild to moderate facet arthrosis. At L2-3, there is mild annular disc bulge and mild facet arthrosis. There is mild central canal stenosis with mild to moderate bilateral neural foraminal stenosis. At L3-4 there is broad-based disc bulge and moderate facet arthrosis. There is  moderate to advanced central canal stenosis and advanced right with moderate to advanced left neural foraminal stenosis. At L4-5 there is broad-based disc bulge and central disc protrusion with facet arthrosis contributing to advanced high-grade central canal stenosis as well as advanced bilateral neural foraminal stenosis. At L5-S1 is broad-based disc bulge and central disc protrusion with facet arthrosis.  There is at least moderate central canal stenosis with moderate to advanced right and mild left neural foraminal stenosis. The paravertebral soft tissues are within normal limits.      Chest: No acute traumatic injury. Large cystic focus along the left aspect of the mediastinum suggesting pericardial cyst.  No other acute cardiopulmonary disease. Abdomen: No acute traumatic injury and no acute inflammatory changes. Gallstones versus sludge within the gallbladder. Pelvis: No acute traumatic injury or inflammatory changes. Small amount gas in the bladder.  Please correlate for recent instrumentation. Thoracic spine: No acute fracture or malalignment. Lumbar spine: No acute fracture or malalignment.  Degenerative changes as described.  Findings are greater at L4-5.  Recommend follow-up MRI for further evaluation unless contraindicated. Signed by Saulo Castillo DO    ECG 12 lead    Result Date: 4/23/2024  Normal sinus rhythm Right bundle branch block Abnormal ECG When compared with ECG of 01-MAY-2022 01:32, QRS voltage has increased Nonspecific T wave abnormality no longer evident in Inferior leads T wave amplitude has increased in Anterolateral leads    XR elbow right 3+ views    Result Date: 4/23/2024  STUDY: Elbow Radiographs; 4/23/2024 9:25 AM. INDICATION: Fall injury. COMPARISON: None Available. ACCESSION NUMBER(S): [id number] ORDERING CLINICIAN: CECI HUYNH TECHNIQUE:  Five views of the right elbow. FINDINGS:  There is no displaced fracture.  Underlying osteopenia.  Mild to moderate degenerative  overhanging osteophytes along the radial head. Changes noted.  The alignment is anatomic.  No soft tissue abnormality is seen.  There is no joint effusion.    No acute osseous abnormality. Signed by Teo Ponce MD               Assessment/Plan   Alcohol use disorder in withdrawal - possibly in DT's  Principal Problem:    Rhabdomyolysis  Active Problems:    ETOH abuse    Fall    Closed head injury    Elevated lactic acid level    Neck pain    Alcohol withdrawal seizure, with unspecified complication (Multi)    Closed nondisplaced fracture of sixth cervical vertebra (Multi)      Pt is needing high dose of Ativan 4 mg so far since this morning. Agitation gets worse close to every 4 hrs period. Plan to start her on Librium taper as ordered  Use ativan only for any breakthrough withdrawal symptoms  PRN Haldol for agitation  Thiamine and folate supplement  Will folllow up           Yury Mohamdu MD

## 2024-04-25 NOTE — PROGRESS NOTES
Internal Medicine Progress Note    Patient Name: Katya Sandhu          MRN: [id number]  Today's Date: April 25, 2024          Attending: Toan Arriaga MD    Subjective:  Patient was seen and examined at bedside, patient is awake but confused.    Review Of Systems:  Unable to obtain due to mental status    Objective:  Vitals:    04/25/24 0800 04/25/24 1157 04/25/24 1200 04/25/24 1240   BP: 122/68 149/74 (!) 184/93 170/84   BP Location:  Right arm     Patient Position:  Lying     Pulse: 73 78 95    Resp:       Temp:  37 °C (98.6 °F)     TempSrc:       SpO2:  97%     Weight:       Height:           Physical Exam:   General appearance: Well-appearing alert, in no acute distress  Neck: Surgical wound in the anterior aspect f neck   Lungs: Clear to auscultation, no wheezing or rhonchi  Heart: RRR without murmur, gallop, or rubs.   Abdomen: Soft, non-tender.  Extremities: No deformity, no edema  Neuro: Awake but confused, altered sensation in upper extremities    Labs:  Results for orders placed or performed during the hospital encounter of 04/23/24 (from the past 24 hour(s))   Magnesium   Result Value Ref Range    Magnesium 2.14 1.60 - 2.40 mg/dL   Basic Metabolic Panel   Result Value Ref Range    Glucose 126 (H) 74 - 99 mg/dL    Sodium 137 136 - 145 mmol/L    Potassium 3.9 3.5 - 5.3 mmol/L    Chloride 107 98 - 107 mmol/L    Bicarbonate 22 21 - 32 mmol/L    Anion Gap 12 10 - 20 mmol/L    Urea Nitrogen 19 6 - 23 mg/dL    Creatinine 0.65 0.50 - 1.05 mg/dL    eGFR >90 >60 mL/min/1.73m*2    Calcium 8.3 (L) 8.6 - 10.3 mg/dL   Creatine Kinase   Result Value Ref Range    Creatine Kinase 339 (H) 0 - 215 U/L   Lavender Top   Result Value Ref Range    Extra Tube Hold for add-ons.    CBC   Result Value Ref Range    WBC 14.0 (H) 4.4 - 11.3 x10*3/uL    nRBC 0.0 0.0 - 0.0 /100 WBCs    RBC 3.48 (L) 4.00 - 5.20 x10*6/uL    Hemoglobin 11.5 (L) 12.0 - 16.0 g/dL    Hematocrit 34.1 (L) 36.0 - 46.0 %    MCV 98 80 - 100 fL    MCH 33.0  "26.0 - 34.0 pg    MCHC 33.7 32.0 - 36.0 g/dL    RDW 13.4 11.5 - 14.5 %    Platelets 273 150 - 450 x10*3/uL       Medications:  Scheduled medications  chlordiazePOXIDE, 25 mg, oral, q6h   Followed by  [START ON 4/27/2024] chlordiazePOXIDE, 25 mg, oral, q8h   Followed by  [START ON 4/29/2024] chlordiazePOXIDE, 25 mg, oral, q12h  cyclobenzaprine, 5 mg, oral, TID  folic acid, 1 mg, oral, Daily  heparin (porcine), 5,000 Units, subcutaneous, q8h  multivitamin with minerals, 1 tablet, oral, Daily  polyethylene glycol, 17 g, oral, Daily  thiamine, 100 mg, intravenous, Daily  zonisamide, 200 mg, oral, Daily      Continuous medications     PRN medications  PRN medications: acetaminophen, dextrose, dextrose, glucagon, glucagon, haloperidol lactate, HYDROmorphone, LORazepam **OR** LORazepam **OR** LORazepam, naloxone, ondansetron ODT **OR** ondansetron, oxyCODONE, oxyCODONE      Assessment/Plan:    Rhabdomyolysis  Continue IV fluids  Continue monitoring       ETOH abuse  Monitor for withdrawal symptoms  Thiamine supplement  Folic acid supplement       Fall  PT/OT       Elevated lactic acid level  IV fluids       Neck pain    Closed nondisplaced fracture of sixth cervical vertebra (Multi)  Patient underwent Anterior cervical C6-7 discectomy and fusion with plating  Reduction of fracture  Neurosurgery is following  Plan for surgical intervention today      Altered mental status    Altered sensation in upper extremities  Neurology consult  Neurology recommended to repeat CT scan of the head  Continue monitoring    Discussed with patient, RN    Toan Arriaga MD   Date: 04/25/24  Time: 3:55 PM    This note was partially created using voice recognition software and is inherently subject to errors including those of syntax and \"sound-alike\" substitutions which may escape proofreading. In such instances, original meaning may be extrapolated by contextual derivation    "

## 2024-04-25 NOTE — PROGRESS NOTES
"Katya Sandhu is a 74 y.o. female on day 2 of admission presenting with Rhabdomyolysis.    Subjective   POD 1 C6-7 ACDF with reduction of fracture by Dr. Forrest Min  Patient moving all extremities antigravity with good strength, continues with right  strength less than left seeming equal versus prior day.  Patient is left-handed  Denies numbness or tingling at this time  Is actively feeding herself with bilateral hand usage and coordination  Patient with decreased activity level versus prior day.  Reviewed with nursing and records show CIWA activated  Incision CDI  Drain output diminished and okay to remove  PT/OT activity as tolerable, dispo planning  Medicine team primary       Objective     Physical Exam  General: Well developed, awake/alert/oriented x3, no distress, alert and cooperative  Skin: Warm and dry, no lesions, no rashes  ENMT: Mucous membranes moist, no apparent injury, no lesions seen  Head/Neck: Neck Supple, no apparent injury  Respiratory/Thorax: Normal breath sounds with good chest expansion, thorax symmetric  Cardiovascular: No pitting edema, no JVD    Motor Strength: 4+ right , otherwise 5/5 Throughout all extremities    Muscle Bulk: Normal and symmetric in all extremities    Posture:    Paraspinal muscle spasm/tenderness present posterior cervical   Midline tenderness absent    Sensation: intact to light touch         Last Recorded Vitals  Blood pressure 122/68, pulse 73, temperature 37 °C (98.6 °F), temperature source Temporal, resp. rate 18, height 1.651 m (5' 5\"), weight 54.9 kg (121 lb 0.5 oz), SpO2 95%.  Intake/Output last 3 Shifts:  I/O last 3 completed shifts:  In: 2475.4 (45.1 mL/kg) [I.V.:2475.4 (45.1 mL/kg)]  Out: 2360 (43 mL/kg) [Urine:2350 (1.2 mL/kg/hr); Drains:10]  Weight: 54.9 kg            Assessment/Plan   Principal Problem:    Rhabdomyolysis  Active Problems:    ETOH abuse    Fall    Closed head injury    Elevated lactic acid level    Neck pain    Alcohol " withdrawal seizure, with unspecified complication (Multi)    Closed nondisplaced fracture of sixth cervical vertebra (Multi)       Narendra Hines PA-C

## 2024-04-25 NOTE — PROGRESS NOTES
Occupational Therapy    Occupational Therapy    Evaluation    Patient Name: Katya Sandhu  MRN: [id number]  Today's Date: 4/25/2024       Assessment  IP OT Assessment  OT Assessment:  (Pt. presetnts with decreased cognition, balance and endurance impacting pt. ability to complete ADLs and mobility.)  Prognosis: Fair  Evaluation/Treatment Tolerance:  (Limited by cogition)  End of Session Communication: Bedside nurse  End of Session Patient Position: Up in chair, Alarm on    Plan:  Treatment Interventions: ADL retraining, Functional transfer training, Endurance training, Cognitive reorientation  OT Frequency: Daily  OT Discharge Recommendations: Moderate intensity level of continued care  OT Recommended Transfer Status: Assist of 2  OT - OK to Discharge: Yes (Next level of care when cleared by medical team)    Subjective   S/p Anterior cervical C6-7 discectomy and fusion with plating  Reduction of fracture on 4/24/24    Katya Sandhu is a 74 y.o. female with medical history of dementia, depression, hypertension, brain lesion, hyperlipidemia, hypothyroidism, alcohol withdrawal seizure,  EtOH, and recurrent falls presented to [facility] from home on 4/23/2024 with complaint of back pain and fall.     Patient reported fell last night from the stair.  Was witnessed fall.  She stated she is going to up stairs  and see did not know how to fall due to poor historian. She states rolling on a stair and landed on back. patient did hit head and elbow did not LOC,  no syncope,  not any on blood thinner.  She said her  found on the floor and and was helping to get her up but she is unable to get up due to her back pain.  she did not know how long she has been on the floor while.  Patient reported  she having upper back pain. Pain is worse 10/10. Sharp shooting pain. denies recent fever/chills, cough, cold symptoms, chest pain, palpitations, lightheadedness/dizziness, shortness of breath, abdominal pain,  "N/V/D, urinary symptoms or leg swelling     Pt is  confused now  alert and oriented to person. Spoke to the  spouse verified her baseline mental status is memory loss and confused h/o dementia. Also verified  alcohol drinking he says she  heavy alcohol drinker  she drinks  4-5 OZ daily.  Spouse reported he tried to get her up in the morning so he could not get up again so he called to EMS.  Current Problem:  1. Rhabdomyolysis        2. Closed head injury, initial encounter        3. Closed nondisplaced fracture of sixth cervical vertebra, unspecified fracture morphology, initial encounter (Multi)  Case Request Operating Room: C6-7 anterior cervical discectomy and fusion, reduction of fracture    Case Request Operating Room: C6-7 anterior cervical discectomy and fusion, reduction of fracture          General:  General  Reason for Referral: ADLs, discharge planning  Referred By: Dr. Arriaga  Family/Caregiver Present: No  Co-Treatment: PT  Co-Treatment Reason: Safety  Prior to Session Communication: Bedside nurse  Patient Position Received: Bed, 3 rail up, Alarm on    Precautions:  Medical Precautions: Fall precautions, Spinal precautions  Post-Surgical Precautions: Spinal precautions        Pain:  Pain Assessment  Pain Assessment: 0-10  Pain Score:  (Does not rate)  Pain Location: Back    Objective     Cognition:  Overall Cognitive Status: Impaired  Orientation Level: Disoriented X4 (unable to state own name; repeats my name is \"Saulo\")  Following Commands: Follows one step commands with repetition  Safety Judgment: Decreased awareness of need for safety precautions  Sustained Attention: Impaired  Insight: Severe  Impulsive: Moderately  Task Initiation: Delayed initiation  Processing Speed: Delayed           Home Living:  Home Living Comments:  (Per chart: Pt. lives with spouse in home with bedroom and bathroom on second floor , can stay on first floor with bathroom)     Prior Function:  Level of Schleicher:  (Pt. " unable to state)      ADL:  Eating Deficit: Setup  LE Dressing Assistance: Moderate    Activity Tolerance:  Endurance: Tolerates 10 - 20 min exercise with multiple rests    Bed Mobility/Transfers:   Bed Mobility  Bed Mobility:  (supine to sit SBA)  Transfers  Transfer:  (sit<>stand min assist x 2)    Ambulation/Gait Training:  Functional Mobility  Functional Mobility Performed:  (Pt. completes functional mobility min assist x 2 with handheld assist)    Sitting Balance:  Static Sitting Balance  Static Sitting-Balance Support: Bilateral upper extremity supported  Static Sitting-Level of Assistance: Contact guard    Standing Balance:  Static Standing Balance  Static Standing-Balance Support: Bilateral upper extremity supported  Static Standing-Level of Assistance: Minimum assistance (x 2)      Sensation:  Sensation Comment: Denies numbness        Hand Function:  Hand Function  Gross Grasp: Functional  Coordination: Functional    Extremities: RUE   RUE : Within Functional Limits and LUE   LUE: Within Functional Limits    Outcome Measures: [facility] Daily Activity  Putting on and taking off regular lower body clothing: A lot  Bathing (including washing, rinsing, drying): A lot  Putting on and taking off regular upper body clothing: A lot  Toileting, which includes using toilet, bedpan or urinal: A lot  Taking care of personal grooming such as brushing teeth: A lot  Eating Meals: A little  Daily Activity - Total Score: 13          EDUCATION:  Education  Individual(s) Educated: Patient  Education Provided: Fall precautons, Risk and benefits of OT discussed with patient or other, POC discussed and agreed upon  Patient Response to Education:  (Pt. will require reinfocement of education)      Goals:   Encounter Problems       Encounter Problems (Active)       Dressings Lower Extremities       STG - Patient to complete lower body dressing SUP       Start:  04/25/24    Expected End:  05/09/24               Grooming       STG -  Patient completes grooming SUP       Start:  04/25/24    Expected End:  05/09/24            STG - Patient will tolerate standing 2-4min       Start:  04/25/24    Expected End:  05/09/24               OT Transfers       STG - Patient will perform toilet transfer min assist       Start:  04/25/24    Expected End:  05/09/24

## 2024-04-25 NOTE — PROGRESS NOTES
OT is recommending SNF for pt. Pt most recent CIWA was 14. Will follow up at a more clinically appropriate time.

## 2024-04-25 NOTE — PROGRESS NOTES
Physical Therapy    Physical Therapy Evaluation    Patient Name: Katya Sandhu  MRN: [id number]  Today's Date: 4/25/2024   Time Calculation  Start Time: 0947  Stop Time: 0959  Time Calculation (min): 12 min    Assessment/Plan   PT Assessment  PT Assessment Results: Decreased strength, Decreased range of motion, Decreased endurance, Impaired balance, Decreased mobility  Rehab Prognosis: Good  Medical Staff Made Aware: Yes  End of Session Communication: Bedside nurse  Assessment Comment: Pt is a 74 y.o. female admitted for Rhabdomyolysis [M62.82]  Closed head injury, initial encounter [S09.90XA] on 4/23/2024. Pt below functional level and will benefit from skilled therapy during stay to improve overall functional mobility, strength, ROM, endurance and safety awareness. Upon discharge pt will benefit from moderate intensity therapy for continued improvement in functional mobility. Therapy will continue to follow and reassess each session.      End of Session Patient Position: Up in chair, Alarm on  IP OR SWING BED PT PLAN  Inpatient or Swing Bed: Inpatient  PT Plan  Treatment/Interventions: Transfer training, Bed mobility, Gait training, Stair training, Balance training, Strengthening, Therapeutic exercise, Therapeutic activity  PT Plan: Skilled PT  PT Frequency: 5 times per week  PT Discharge Recommendations: Moderate intensity level of continued care  Equipment Recommended upon Discharge: Wheeled walker  PT Recommended Transfer Status: Assist x1  PT - OK to Discharge: Yes To next level of care when cleared by medical team.    Subjective     Current Problem:  Patient Active Problem List   Diagnosis    ETOH abuse    Aphasia    Depression    HTN (hypertension)    Hypothyroidism    Localized, primary osteoarthritis    Migraine headache    Myocardial infarction, inferolateral wall (Multi)    Brain lesion    Seizure disorder (Multi)    Rhabdomyolysis    Fall    Closed head injury    Elevated lactic acid level     "Neck pain    Alcohol withdrawal seizure, with unspecified complication (Multi)    Closed nondisplaced fracture of sixth cervical vertebra (Multi)       General Visit Information:  General  Reason for Referral: impaired mobility  Referred By: Dr. Arriaga  Family/Caregiver Present: No  Co-Treatment: OT  Co-Treatment Reason: Patient safety  Prior to Session Communication: Bedside nurse  Patient Position Received: Bed, 3 rail up, Alarm on    Home Living:  Home Living  Type of Home: House  Lives With: Spouse  Home Layout: Two level, Able to live on main level with bedroom/bathroom    Prior Level of Function:  Prior Function Per Pt/Caregiver Report  Level of [place]:  (Pt. unable to state)    Precautions:  Precautions  Medical Precautions: Fall precautions, Spinal precautions  Post-Surgical Precautions: Spinal precautions    Vital Signs:     Objective     Pain:  Pain Assessment  Pain Assessment: 0-10  Pain Score:  (unable to rate due to poor cognition)    Cognition:  Cognition  Overall Cognitive Status: Impaired  Orientation Level: Disoriented X4 (unable to state own name; repeats my name is \"Saulo\")  Following Commands: Follows one step commands with repetition  Safety Judgment: Decreased awareness of need for safety precautions  Sustained Attention: Impaired  Insight: Severe  Impulsive: Moderately  Task Initiation: Delayed initiation  Processing Speed: Delayed    General Assessments:      Activity Tolerance  Endurance: Tolerates 10 - 20 min exercise with multiple rests  Sensation  Sensation Comment: Denies numbness              Static Sitting Balance  Static Sitting-Comment/Number of Minutes: fair  Dynamic Sitting Balance  Dynamic Sitting-Comments: poor  Static Standing Balance  Static Standing-Comment/Number of Minutes: poor  Dynamic Standing Balance  Dynamic Standing-Comments: poor    Functional Assessments:     Bed Mobility  Bed Mobility: Yes  Bed Mobility 1  Bed Mobility 1: Supine to sitting  Level of Assistance " 1: Contact guard  Transfers  Transfer: Yes (sit<>stand min assist x 2)  Transfer 1  Technique 1: Sit to stand, Stand to sit  Transfer Device 1: Gait belt  Transfer Level of Assistance 1: Minimum assistance, +2  Ambulation/Gait Training  Ambulation/Gait Training Performed: Yes  Ambulation/Gait Training 1  Surface 1: Level tile  Device 1: Rolling walker  Gait Support Devices: Gait belt  Assistance 1: Minimum assistance  Comments/Distance (ft) 1: 5          Extremity/Trunk Assessments:        RLE   RLE : Within Functional Limits  LLE   LLE : Within Functional Limits    Outcome Measures:  [facility] Basic Mobility  Turning from your back to your side while in a flat bed without using bedrails: A lot  Moving from lying on your back to sitting on the side of a flat bed without using bedrails: A lot  Moving to and from bed to chair (including a wheelchair): A lot  Standing up from a chair using your arms (e.g. wheelchair or bedside chair): A lot  To walk in hospital room: A lot  Climbing 3-5 steps with railing: A lot  Basic Mobility - Total Score: 12                            Goals:  Encounter Problems       Encounter Problems (Active)       PT Problem       Pt will demonstrate mod I for all bed mobility   (Progressing)       Start:  04/25/24    Expected End:  05/09/24            Pt will demonstrate mod I for all transfers with WW  (Progressing)       Start:  04/25/24    Expected End:  05/09/24            Pt will ambulate 50 ft with WW and mod I (Progressing)       Start:  04/25/24    Expected End:  05/09/24            Pt will demonstrate good dynamic standing balance while performing a functional task.   (Progressing)       Start:  04/25/24    Expected End:  05/09/24               Pain - Adult            Education Documentation  Precautions, taught by Yvonne Webb, PT at 4/25/2024  4:28 PM.  Learner: Patient  Readiness: Acceptance  Method: Explanation  Response: Verbalizes Understanding, Needs Reinforcement    Body  Mechanics, taught by Yvonne Webb, PT at 4/25/2024  4:28 PM.  Learner: Patient  Readiness: Acceptance  Method: Explanation  Response: Verbalizes Understanding, Needs Reinforcement    Mobility Training, taught by Yvonne Webb, PT at 4/25/2024  4:28 PM.  Learner: Patient  Readiness: Acceptance  Method: Explanation  Response: Verbalizes Understanding, Needs Reinforcement    Education Comments  No comments found.

## 2024-04-26 LAB
ANION GAP SERPL CALC-SCNC: 16 MMOL/L (ref 10–20)
BUN SERPL-MCNC: 18 MG/DL (ref 6–23)
CALCIUM SERPL-MCNC: 8.7 MG/DL (ref 8.6–10.3)
CHLORIDE SERPL-SCNC: 104 MMOL/L (ref 98–107)
CO2 SERPL-SCNC: 19 MMOL/L (ref 21–32)
CREAT SERPL-MCNC: 0.51 MG/DL (ref 0.5–1.05)
EGFRCR SERPLBLD CKD-EPI 2021: >90 ML/MIN/1.73M*2
ERYTHROCYTE [DISTWIDTH] IN BLOOD BY AUTOMATED COUNT: 13.2 % (ref 11.5–14.5)
GLUCOSE SERPL-MCNC: 96 MG/DL (ref 74–99)
HCT VFR BLD AUTO: 43.4 % (ref 36–46)
HGB BLD-MCNC: 14.2 G/DL (ref 12–16)
MCH RBC QN AUTO: 32.7 PG (ref 26–34)
MCHC RBC AUTO-ENTMCNC: 32.7 G/DL (ref 32–36)
MCV RBC AUTO: 100 FL (ref 80–100)
NRBC BLD-RTO: 0 /100 WBCS (ref 0–0)
PLATELET # BLD AUTO: 147 X10*3/UL (ref 150–450)
POTASSIUM SERPL-SCNC: 3.9 MMOL/L (ref 3.5–5.3)
RBC # BLD AUTO: 4.34 X10*6/UL (ref 4–5.2)
SODIUM SERPL-SCNC: 135 MMOL/L (ref 136–145)
WBC # BLD AUTO: 11.8 X10*3/UL (ref 4.4–11.3)

## 2024-04-26 PROCEDURE — 2500000001 HC RX 250 WO HCPCS SELF ADMINISTERED DRUGS (ALT 637 FOR MEDICARE OP): Performed by: PSYCHIATRY & NEUROLOGY

## 2024-04-26 PROCEDURE — 85027 COMPLETE CBC AUTOMATED: CPT | Performed by: NURSE PRACTITIONER

## 2024-04-26 PROCEDURE — 2500000001 HC RX 250 WO HCPCS SELF ADMINISTERED DRUGS (ALT 637 FOR MEDICARE OP): Performed by: STUDENT IN AN ORGANIZED HEALTH CARE EDUCATION/TRAINING PROGRAM

## 2024-04-26 PROCEDURE — 2500000004 HC RX 250 GENERAL PHARMACY W/ HCPCS (ALT 636 FOR OP/ED): Performed by: STUDENT IN AN ORGANIZED HEALTH CARE EDUCATION/TRAINING PROGRAM

## 2024-04-26 PROCEDURE — 80048 BASIC METABOLIC PNL TOTAL CA: CPT | Performed by: NURSE PRACTITIONER

## 2024-04-26 PROCEDURE — 2500000006 HC RX 250 W HCPCS SELF ADMINISTERED DRUGS (ALT 637 FOR ALL PAYERS): Performed by: STUDENT IN AN ORGANIZED HEALTH CARE EDUCATION/TRAINING PROGRAM

## 2024-04-26 PROCEDURE — 36415 COLL VENOUS BLD VENIPUNCTURE: CPT | Performed by: NURSE PRACTITIONER

## 2024-04-26 PROCEDURE — 99232 SBSQ HOSP IP/OBS MODERATE 35: CPT | Performed by: PSYCHIATRY & NEUROLOGY

## 2024-04-26 PROCEDURE — 97530 THERAPEUTIC ACTIVITIES: CPT | Mod: GO,CO

## 2024-04-26 PROCEDURE — 97530 THERAPEUTIC ACTIVITIES: CPT | Mod: GP,CQ

## 2024-04-26 PROCEDURE — 1200000002 HC GENERAL ROOM WITH TELEMETRY DAILY

## 2024-04-26 PROCEDURE — 97535 SELF CARE MNGMENT TRAINING: CPT | Mod: GO,CO

## 2024-04-26 PROCEDURE — 97110 THERAPEUTIC EXERCISES: CPT | Mod: GP,CQ

## 2024-04-26 RX ORDER — ACETAMINOPHEN 500 MG
5 TABLET ORAL NIGHTLY
Status: DISCONTINUED | OUTPATIENT
Start: 2024-04-26 | End: 2024-04-29

## 2024-04-26 RX ORDER — LORAZEPAM 0.5 MG/1
0.5 TABLET ORAL EVERY 6 HOURS PRN
Status: DISCONTINUED | OUTPATIENT
Start: 2024-04-26 | End: 2024-04-29

## 2024-04-26 RX ORDER — ACETAMINOPHEN 325 MG/1
650 TABLET ORAL EVERY 6 HOURS PRN
Qty: 112 TABLET | Refills: 0 | Status: SHIPPED | OUTPATIENT
Start: 2024-04-26 | End: 2024-05-10

## 2024-04-26 RX ADMIN — LORAZEPAM 0.5 MG: 2 INJECTION, SOLUTION INTRAMUSCULAR; INTRAVENOUS at 00:25

## 2024-04-26 RX ADMIN — CYCLOBENZAPRINE HYDROCHLORIDE 5 MG: 5 TABLET, FILM COATED ORAL at 22:14

## 2024-04-26 RX ADMIN — HEPARIN SODIUM 5000 UNITS: 5000 INJECTION INTRAVENOUS; SUBCUTANEOUS at 15:44

## 2024-04-26 RX ADMIN — Medication 1 TABLET: at 09:07

## 2024-04-26 RX ADMIN — HEPARIN SODIUM 5000 UNITS: 5000 INJECTION INTRAVENOUS; SUBCUTANEOUS at 09:07

## 2024-04-26 RX ADMIN — CHLORDIAZEPOXIDE HYDROCHLORIDE 25 MG: 25 CAPSULE ORAL at 09:08

## 2024-04-26 RX ADMIN — FOLIC ACID 1 MG: 1 TABLET ORAL at 09:07

## 2024-04-26 RX ADMIN — CYCLOBENZAPRINE HYDROCHLORIDE 5 MG: 5 TABLET, FILM COATED ORAL at 15:44

## 2024-04-26 RX ADMIN — CHLORDIAZEPOXIDE HYDROCHLORIDE 25 MG: 25 CAPSULE ORAL at 22:13

## 2024-04-26 RX ADMIN — Medication 5 MG: at 22:13

## 2024-04-26 RX ADMIN — CHLORDIAZEPOXIDE HYDROCHLORIDE 25 MG: 25 CAPSULE ORAL at 04:12

## 2024-04-26 RX ADMIN — OXYCODONE HYDROCHLORIDE 5 MG: 5 TABLET ORAL at 05:08

## 2024-04-26 RX ADMIN — CHLORDIAZEPOXIDE HYDROCHLORIDE 25 MG: 25 CAPSULE ORAL at 15:44

## 2024-04-26 RX ADMIN — POLYETHYLENE GLYCOL 3350 17 G: 17 POWDER, FOR SOLUTION ORAL at 09:08

## 2024-04-26 RX ADMIN — CYCLOBENZAPRINE HYDROCHLORIDE 5 MG: 5 TABLET, FILM COATED ORAL at 09:10

## 2024-04-26 RX ADMIN — LORAZEPAM 0.5 MG: 2 INJECTION, SOLUTION INTRAMUSCULAR; INTRAVENOUS at 10:11

## 2024-04-26 RX ADMIN — ZONISAMIDE 200 MG: 100 CAPSULE ORAL at 09:10

## 2024-04-26 RX ADMIN — HEPARIN SODIUM 5000 UNITS: 5000 INJECTION INTRAVENOUS; SUBCUTANEOUS at 00:25

## 2024-04-26 RX ADMIN — THIAMINE HYDROCHLORIDE 100 MG: 100 INJECTION, SOLUTION INTRAMUSCULAR; INTRAVENOUS at 09:08

## 2024-04-26 ASSESSMENT — COGNITIVE AND FUNCTIONAL STATUS - GENERAL
MOBILITY SCORE: 12
MOVING TO AND FROM BED TO CHAIR: A LOT
TOILETING: A LOT
DRESSING REGULAR LOWER BODY CLOTHING: A LOT
WALKING IN HOSPITAL ROOM: A LOT
DRESSING REGULAR UPPER BODY CLOTHING: A LOT
TURNING FROM BACK TO SIDE WHILE IN FLAT BAD: A LOT
MOVING TO AND FROM BED TO CHAIR: A LOT
MOBILITY SCORE: 11
STANDING UP FROM CHAIR USING ARMS: A LOT
TURNING FROM BACK TO SIDE WHILE IN FLAT BAD: A LOT
PERSONAL GROOMING: A LOT
CLIMB 3 TO 5 STEPS WITH RAILING: A LOT
HELP NEEDED FOR BATHING: A LOT
DRESSING REGULAR UPPER BODY CLOTHING: A LOT
TURNING FROM BACK TO SIDE WHILE IN FLAT BAD: A LOT
HELP NEEDED FOR BATHING: A LOT
PERSONAL GROOMING: A LOT
STANDING UP FROM CHAIR USING ARMS: A LOT
CLIMB 3 TO 5 STEPS WITH RAILING: TOTAL
STANDING UP FROM CHAIR USING ARMS: A LOT
TOILETING: A LOT
CLIMB 3 TO 5 STEPS WITH RAILING: A LOT
MOVING FROM LYING ON BACK TO SITTING ON SIDE OF FLAT BED WITH BEDRAILS: A LOT
STANDING UP FROM CHAIR USING ARMS: A LOT
HELP NEEDED FOR BATHING: A LOT
WALKING IN HOSPITAL ROOM: A LOT
MOVING TO AND FROM BED TO CHAIR: A LOT
TURNING FROM BACK TO SIDE WHILE IN FLAT BAD: A LOT
DAILY ACTIVITIY SCORE: 13
DAILY ACTIVITIY SCORE: 15
HELP NEEDED FOR BATHING: A LOT
MOBILITY SCORE: 12
WALKING IN HOSPITAL ROOM: A LOT
DRESSING REGULAR LOWER BODY CLOTHING: A LOT
MOVING FROM LYING ON BACK TO SITTING ON SIDE OF FLAT BED WITH BEDRAILS: A LOT
DAILY ACTIVITIY SCORE: 13
DRESSING REGULAR LOWER BODY CLOTHING: A LOT
TOILETING: A LITTLE
WALKING IN HOSPITAL ROOM: A LOT
DRESSING REGULAR UPPER BODY CLOTHING: A LITTLE
EATING MEALS: A LITTLE
MOVING FROM LYING ON BACK TO SITTING ON SIDE OF FLAT BED WITH BEDRAILS: A LITTLE
EATING MEALS: A LITTLE
PERSONAL GROOMING: A LOT
PERSONAL GROOMING: A LOT
DAILY ACTIVITIY SCORE: 13
MOBILITY SCORE: 12
MOVING TO AND FROM BED TO CHAIR: A LOT
EATING MEALS: A LITTLE
DRESSING REGULAR LOWER BODY CLOTHING: A LOT
MOVING FROM LYING ON BACK TO SITTING ON SIDE OF FLAT BED WITH BEDRAILS: A LOT
EATING MEALS: A LITTLE
DRESSING REGULAR UPPER BODY CLOTHING: A LOT
TOILETING: A LOT
CLIMB 3 TO 5 STEPS WITH RAILING: TOTAL

## 2024-04-26 ASSESSMENT — LIFESTYLE VARIABLES
ANXIETY: MILDLY ANXIOUS
NAUSEA AND VOMITING: NO NAUSEA AND NO VOMITING
AUDITORY DISTURBANCES: NOT PRESENT
AUDITORY DISTURBANCES: NOT PRESENT
PAROXYSMAL SWEATS: BARELY PERCEPTIBLE SWEATING, PALMS MOIST
ANXIETY: NO ANXIETY, AT EASE
AUDITORY DISTURBANCES: NOT PRESENT
AGITATION: NORMAL ACTIVITY
PAROXYSMAL SWEATS: BARELY PERCEPTIBLE SWEATING, PALMS MOIST
TREMOR: NOT VISIBLE, BUT CAN BE FELT FINGERTIP TO FINGERTIP
PAROXYSMAL SWEATS: NO SWEAT VISIBLE
AUDITORY DISTURBANCES: NOT PRESENT
ORIENTATION AND CLOUDING OF SENSORIUM: DISORIENTED FOR PLACE OR PERSON
BLOOD PRESSURE: 146/78
ANXIETY: NO ANXIETY, AT EASE
HEADACHE, FULLNESS IN HEAD: NOT PRESENT
PAROXYSMAL SWEATS: BARELY PERCEPTIBLE SWEATING, PALMS MOIST
TOTAL SCORE: 7
TOTAL SCORE: 6
AUDITORY DISTURBANCES: NOT PRESENT
ANXIETY: MILDLY ANXIOUS
TREMOR: NOT VISIBLE, BUT CAN BE FELT FINGERTIP TO FINGERTIP
ORIENTATION AND CLOUDING OF SENSORIUM: CANNOT DO SERIAL ADDITIONS OR IS UNCERTAIN ABOUT DATE
AGITATION: NORMAL ACTIVITY
ORIENTATION AND CLOUDING OF SENSORIUM: CANNOT DO SERIAL ADDITIONS OR IS UNCERTAIN ABOUT DATE
TOTAL SCORE: 5
NAUSEA AND VOMITING: NO NAUSEA AND NO VOMITING
HEADACHE, FULLNESS IN HEAD: NOT PRESENT
PAROXYSMAL SWEATS: NO SWEAT VISIBLE
PULSE: 76
PULSE: 90
TOTAL SCORE: 3
TREMOR: NOT VISIBLE, BUT CAN BE FELT FINGERTIP TO FINGERTIP
ORIENTATION AND CLOUDING OF SENSORIUM: CANNOT DO SERIAL ADDITIONS OR IS UNCERTAIN ABOUT DATE
HEADACHE, FULLNESS IN HEAD: NOT PRESENT
HEADACHE, FULLNESS IN HEAD: NOT PRESENT
NAUSEA AND VOMITING: NO NAUSEA AND NO VOMITING
HEADACHE, FULLNESS IN HEAD: NOT PRESENT
TREMOR: NOT VISIBLE, BUT CAN BE FELT FINGERTIP TO FINGERTIP
AGITATION: NORMAL ACTIVITY
VISUAL DISTURBANCES: NOT PRESENT
AGITATION: SOMEWHAT MORE THAN NORMAL ACTIVITY
ORIENTATION AND CLOUDING OF SENSORIUM: DISORIENTED FOR PLACE OR PERSON
NAUSEA AND VOMITING: NO NAUSEA AND NO VOMITING
PULSE: 102
VISUAL DISTURBANCES: NOT PRESENT
BLOOD PRESSURE: 130/80
AUDITORY DISTURBANCES: NOT PRESENT
VISUAL DISTURBANCES: NOT PRESENT
ANXIETY: MILDLY ANXIOUS
TOTAL SCORE: 4
AGITATION: NORMAL ACTIVITY
AGITATION: SOMEWHAT MORE THAN NORMAL ACTIVITY
NAUSEA AND VOMITING: NO NAUSEA AND NO VOMITING
TOTAL SCORE: 4
TREMOR: NO TREMOR
TREMOR: NOT VISIBLE, BUT CAN BE FELT FINGERTIP TO FINGERTIP
ORIENTATION AND CLOUDING OF SENSORIUM: DISORIENTED FOR DATA BY NO MORE THAN 2 CALENDAR DAYS
VISUAL DISTURBANCES: NOT PRESENT
VISUAL DISTURBANCES: NOT PRESENT
PAROXYSMAL SWEATS: NO SWEAT VISIBLE
VISUAL DISTURBANCES: NOT PRESENT
BLOOD PRESSURE: 140/91
ANXIETY: 3
HEADACHE, FULLNESS IN HEAD: NOT PRESENT
NAUSEA AND VOMITING: NO NAUSEA AND NO VOMITING

## 2024-04-26 ASSESSMENT — PAIN SCALES - GENERAL
PAINLEVEL_OUTOF10: 0 - NO PAIN
PAINLEVEL_OUTOF10: 2

## 2024-04-26 ASSESSMENT — PAIN - FUNCTIONAL ASSESSMENT
PAIN_FUNCTIONAL_ASSESSMENT: 0-10
PAIN_FUNCTIONAL_ASSESSMENT: WONG-BAKER FACES

## 2024-04-26 ASSESSMENT — PAIN SCALES - WONG BAKER
WONGBAKER_NUMERICALRESPONSE: NO HURT

## 2024-04-26 ASSESSMENT — ACTIVITIES OF DAILY LIVING (ADL): HOME_MANAGEMENT_TIME_ENTRY: 10

## 2024-04-26 ASSESSMENT — PAIN DESCRIPTION - LOCATION: LOCATION: BACK

## 2024-04-26 NOTE — PROGRESS NOTES
"Physical Therapy    Physical Therapy Treatment    Patient Name: Katya Sandhu  MRN: [id number]  Today's Date: 4/26/2024  Time Calculation  Start Time: 1115  Stop Time: 1138  Time Calculation (min): 23 min        04/26/24 1115   PT  Visit   PT Received On 04/26/24   Response to Previous Treatment Patient with no complaints from previous session.   General   Referred By Dr. Arriaga   Prior to Session Communication Bedside nurse   Patient Position Received Bed, 3 rail up;Alarm on   Preferred Learning Style verbal;visual   General Comment Pt agreeable to therapy. Pt repeats phrases. Once sitting up pt repeated \"raoul raoul, good\" and once in the chair pt repeated \" Good, perfect, raoul\". Pt kept falling asleep throughout session   Precautions   Medical Precautions Fall precautions   Post-Surgical Precautions Spinal precautions   Pain Assessment   Pain Assessment 0-10   Pain Score   (unable to state/ rate)   Cognition   Overall Cognitive Status Impaired   Orientation Level Disoriented to person;Disoriented to situation;Disoriented to time;Disoriented to place  (pt unable to identify self)   Following Commands Follows one step commands with repetition   Safety Judgment Decreased awareness of need for safety precautions   Sustained Attention Impaired   Insight Severe   Impulsive Moderately   Task Initiation Delayed initiation   Processing Speed Delayed   Postural Control   Postural Control Impaired   Posture Comment total assist for posture control in sitting   Static Standing Balance   Static Standing-Level of Assistance Minimum assistance   Static Standing-Comment/Number of Minutes pt able to stand for a couple of mintues before needing to sit   Therapeutic Exercise   Therapeutic Exercise Performed Yes  (supine PROM)   Therapeutic Exercise Activity 1 PROM ankle pumps x 15   Therapeutic Exercise Activity 2 PROM heel slides x 15   Therapeutic Exercise Activity 3 PROM hip ABD x 15   Bed Mobility   Bed Mobility Yes "   Bed Mobility 1   Bed Mobility 1 Supine to sitting   Level of Assistance 1 Moderate assistance  (mod x 2)   Bed Mobility Comments 1 pt needed assistance with trunk control and LE control. pt able to initiate scooting LE toward EOB   Ambulation/Gait Training   Ambulation/Gait Training Performed No   Ambulation/Gait Training 1   Comments/Distance (ft) 1 pt unable to ambulate at this time. Pt able to complete a stand pivot transfer from bed to chair   Transfers   Transfer Yes   Transfer 1   Transfer From 1 Bed to   Transfer to 1 Stand   Technique 1 Stand to sit;Sit to stand   Transfer Level of Assistance 1 Minimum assistance;+2   Trials/Comments 1 pt impulsive and stood up before therapist asked her to stand   Transfers 2   Transfer From 2 Chair with arms to   Transfer to 2 Sit   Technique 2 Stand to sit   Transfer Level of Assistance 2 Minimum assistance;+2   Activity Tolerance   Endurance Tolerates 10 - 20 min exercise with multiple rests   PT Assessment   PT Assessment Results Decreased strength;Decreased endurance;Impaired balance;Decreased mobility   Rehab Prognosis Fair   Medical Staff Made Aware Yes   End of Session Communication Bedside nurse   Assessment Comment Pt had difficulty keeping eyes open during session. Pt able to complete 2 sit to stands from bed with Min A x 2- pt impulsive and did not wait for therapist. Supine ther ex was PROM.   End of Session Patient Position Up in chair;Alarm on   Outpatient Education   Individual(s) Educated Patient   Education Provided Fall Risk;Home Exercise Program   Risk and Benefits Discussed with Patient/Caregiver/Other yes   Patient/Caregiver Demonstrated Understanding yes   Plan of Care Discussed and Agreed Upon yes   Patient Response to Education Patient/Caregiver Verbalized Understanding of Information   PT Plan   Inpatient/Swing Bed or Outpatient Inpatient   PT Plan   Treatment/Interventions Bed mobility;Transfer training;Therapeutic exercise   PT Plan Skilled PT    PT Frequency 5 times per week   PT Discharge Recommendations Moderate intensity level of continued care   Equipment Recommended upon Discharge Wheeled walker   PT Recommended Transfer Status Assist x1;Assistive device;Contact guard       Outcome Measures:  [facility] Basic Mobility  Turning from your back to your side while in a flat bed without using bedrails: A lot  Moving from lying on your back to sitting on the side of a flat bed without using bedrails: A lot  Moving to and from bed to chair (including a wheelchair): A lot  Standing up from a chair using your arms (e.g. wheelchair or bedside chair): A lot  To walk in hospital room: A lot  Climbing 3-5 steps with railing: Total  Basic Mobility - Total Score: 11     EDUCATION:  Outpatient Education  Individual(s) Educated: Patient  Education Provided: Fall Risk, Home Exercise Program  Risk and Benefits Discussed with Patient/Caregiver/Other: yes  Patient/Caregiver Demonstrated Understanding: yes  Plan of Care Discussed and Agreed Upon: yes  Patient Response to Education: Patient/Caregiver Verbalized Understanding of Information  GOALS:  Encounter Problems       Encounter Problems (Active)       PT Problem       Pt will demonstrate mod I for all bed mobility   (Progressing)       Start:  04/25/24    Expected End:  05/09/24            Pt will demonstrate mod I for all transfers with WW  (Progressing)       Start:  04/25/24    Expected End:  05/09/24            Pt will ambulate 50 ft with WW and mod I (Progressing)       Start:  04/25/24    Expected End:  05/09/24            Pt will demonstrate good dynamic standing balance while performing a functional task.   (Progressing)       Start:  04/25/24    Expected End:  05/09/24               Pain - Adult

## 2024-04-26 NOTE — PROGRESS NOTES
Occupational Therapy    OT Treatment    Patient Name: Katya Sandhu  MRN: [id number]  Today's Date: 4/26/2024  Time Calculation  Start Time: 1115  Stop Time: 1138  Time Calculation (min): 23 min         Assessment:  OT Assessment: Pt. presetnts with decreased cognition, balance and endurance impacting pt. ability to complete ADLs and mobility.  Prognosis: Fair  Evaluation/Treatment Tolerance: Patient tolerated treatment well  End of Session Communication: Bedside nurse  End of Session Patient Position: Up in chair, Alarm on  OT Assessment Results: Decreased ADL status, Decreased safe judgment during ADL, Decreased cognition, Decreased endurance, Decreased functional mobility  Prognosis: Fair  Evaluation/Treatment Tolerance: Patient tolerated treatment well    Plan:  Treatment Interventions: ADL retraining, Functional transfer training  OT Frequency: Daily  OT Discharge Recommendations: Moderate intensity level of continued care  Equipment Recommended upon Discharge: Wheeled walker  Treatment Interventions: ADL retraining, Functional transfer training  Subjective     Outcome Measures:[facility] Daily Activity  Putting on and taking off regular lower body clothing: A lot  Bathing (including washing, rinsing, drying): A lot  Putting on and taking off regular upper body clothing: A lot  Toileting, which includes using toilet, bedpan or urinal: A lot  Taking care of personal grooming such as brushing teeth: A lot  Eating Meals: A little  Daily Activity - Total Score: 13         04/26/24 1116   OT Last Visit   OT Received On 04/26/24   General   Reason for Referral impaired mobility   Referred By Dr. Arriaga   Prior to Session Communication Bedside nurse   Patient Position Received Bed, 3 rail up;Alarm on   Preferred Learning Style verbal;visual   General Comment Pt laying in bed, needs cues to keep eyes open for session. Pt has soft voice and difficult to understand   Precautions   Medical Precautions Fall  precautions;Spinal precautions   Post-Surgical Precautions Spinal precautions   Pain Assessment   Pain Assessment FACES   Benavidez-Morgan FACES Pain Rating 0   Cognition   Orientation Level Disoriented to place;Disoriented to time;Disoriented to situation   Grooming   Grooming Level of Assistance Minimum assistance   Grooming Where Assessed Edge of bed   Grooming Comments washed face, assist for thoroughness. another person to assist with maintaining seated balance   Toileting   Toileting Comments Pt has indwelling enriquez   Bed Mobility   Bed Mobility   (Max A to EOB  )   Bed Mobility 1   Bed Mobility 1 Supine to sitting   Level of Assistance 1 Moderate assistance  (mod x 2)   Transfers   Transfer   (STS at mod A of 2 people, bed to chair mod A of 2 people, arm in arm  )   Static Sitting Balance   Static Sitting-Level of Assistance Maximum assistance   Static Sitting-Comment/Number of Minutes poor   IP OT Assessment   OT Assessment Pt. presetnts with decreased cognition, balance and endurance impacting pt. ability to complete ADLs and mobility.   Prognosis Fair   Evaluation/Treatment Tolerance Patient tolerated treatment well   End of Session Communication Bedside nurse   End of Session Patient Position Up in chair;Alarm on   OT Assessment   OT Assessment Results Decreased ADL status;Decreased safe judgment during ADL;Decreased cognition;Decreased endurance;Decreased functional mobility   Education   Individual(s) Educated Patient   Education Provided Fall precautons;Risk and benefits of OT discussed with patient or other;POC discussed and agreed upon   Patient Response to Education   (No evidence of learning)   Education Comment Pt would benefit from continued reinforcement   Inpatient Plan   Treatment Interventions ADL retraining;Functional transfer training   OT Frequency Daily   OT Discharge Recommendations Moderate intensity level of continued care   Equipment Recommended upon Discharge Wheeled walker          Goals:  Encounter Problems       Encounter Problems (Active)       Dressings Lower Extremities       STG - Patient to complete lower body dressing SUP (Progressing)       Start:  04/25/24    Expected End:  05/09/24               Grooming       STG - Patient completes grooming SUP (Progressing)       Start:  04/25/24    Expected End:  05/09/24            STG - Patient will tolerate standing 2-4min (Progressing)       Start:  04/25/24    Expected End:  05/09/24               OT Transfers       STG - Patient will perform toilet transfer min assist (Progressing)       Start:  04/25/24    Expected End:  05/09/24

## 2024-04-26 NOTE — PROGRESS NOTES
"Katya Sandhu is a 74 y.o. female on day 3 of admission presenting with Rhabdomyolysis.    Subjective   POD 2 C6-7 ACDF with reduction of fracture by Dr. Forrest Min  Patient with CIWA and multiple high ratings requiring ativan, tired on visit this am  Patient moving all extremities antigravity with good strength      Denies numbness or tingling at this time  Incision CDI  Drain out  PT/OT activity as tolerable, dispo planning    Okay for discharge with neurosurgery followup when determined appropriate by medicine, Medicine team primary       Objective     Physical Exam  General: Well developed, awake/alert/oriented x3, no distress, alert and cooperative  Skin: Warm and dry, no lesions, no rashes  ENMT: Mucous membranes moist, no apparent injury, no lesions seen  Head/Neck: Neck Supple, no apparent injury  Respiratory/Thorax: Normal breath sounds with good chest expansion, thorax symmetric  Cardiovascular: No pitting edema, no JVD    Motor Strength: 4+ right , antigravity lower extremities    Muscle Bulk: Normal and symmetric in all extremities    Posture:    Paraspinal muscle spasm/tenderness present posterior cervical   Midline tenderness absent    Sensation: intact to light touch         Last Recorded Vitals  Blood pressure 146/78, pulse 76, temperature 37 °C (98.6 °F), temperature source Temporal, resp. rate 20, height 1.651 m (5' 5\"), weight 54.9 kg (121 lb 0.5 oz), SpO2 96%.  Intake/Output last 3 Shifts:  I/O last 3 completed shifts:  In: 1618.8 (29.5 mL/kg) [P.O.:880; I.V.:738.8 (13.5 mL/kg)]  Out: 2785 (50.7 mL/kg) [Urine:2775 (1.4 mL/kg/hr); Drains:10]  Weight: 54.9 kg            Assessment/Plan   Principal Problem:    Rhabdomyolysis  Active Problems:    ETOH abuse    Fall    Closed head injury    Elevated lactic acid level    Neck pain    Alcohol withdrawal seizure, with unspecified complication (Multi)    Closed nondisplaced fracture of sixth cervical vertebra (Multi)       Narendra Hines, " JERRY

## 2024-04-26 NOTE — PROGRESS NOTES
Internal Medicine Progress Note    Patient Name: Katya Sandhu          MRN: [id number]  Today's Date: April 26, 2024          Attending: Toan Arriaga MD    Subjective:  Patient was seen and examined at bedside, pateint is delirius .    Review Of Systems:  Unable to obtain due to mental status    Objective:  Vitals:    04/26/24 0400 04/26/24 0600 04/26/24 0800 04/26/24 1000   BP: 146/79 (!) 140/91 146/78 130/80   BP Location: Left arm      Patient Position: Lying      Pulse: 88 90 76 102   Resp: 20  15    Temp: 37 °C (98.6 °F)  36.8 °C (98.2 °F)    TempSrc: Temporal  Temporal    SpO2: 96%  98%    Weight:       Height:           Physical Exam:   General appearance: Well-appearing alert, in no acute distress  Neck: Surgical wound in the anterior aspect of neck   Lungs: Clear to auscultation, no wheezing or rhonchi  Heart: RRR without murmur, gallop, or rubs.   Abdomen: Soft, non-tender.  Neuro: Awake but confused    Labs:  Results for orders placed or performed during the hospital encounter of 04/23/24 (from the past 24 hour(s))   CBC   Result Value Ref Range    WBC 11.8 (H) 4.4 - 11.3 x10*3/uL    nRBC 0.0 0.0 - 0.0 /100 WBCs    RBC 4.34 4.00 - 5.20 x10*6/uL    Hemoglobin 14.2 12.0 - 16.0 g/dL    Hematocrit 43.4 36.0 - 46.0 %     80 - 100 fL    MCH 32.7 26.0 - 34.0 pg    MCHC 32.7 32.0 - 36.0 g/dL    RDW 13.2 11.5 - 14.5 %    Platelets 147 (L) 150 - 450 x10*3/uL   Basic Metabolic Panel   Result Value Ref Range    Glucose 96 74 - 99 mg/dL    Sodium 135 (L) 136 - 145 mmol/L    Potassium 3.9 3.5 - 5.3 mmol/L    Chloride 104 98 - 107 mmol/L    Bicarbonate 19 (L) 21 - 32 mmol/L    Anion Gap 16 10 - 20 mmol/L    Urea Nitrogen 18 6 - 23 mg/dL    Creatinine 0.51 0.50 - 1.05 mg/dL    eGFR >90 >60 mL/min/1.73m*2    Calcium 8.7 8.6 - 10.3 mg/dL       Medications:  Scheduled medications  chlordiazePOXIDE, 25 mg, oral, q6h   Followed by  [START ON 4/27/2024] chlordiazePOXIDE, 25 mg, oral, q8h   Followed by  [START  "ON 4/29/2024] chlordiazePOXIDE, 25 mg, oral, q12h  cyclobenzaprine, 5 mg, oral, TID  folic acid, 1 mg, oral, Daily  heparin (porcine), 5,000 Units, subcutaneous, q8h  multivitamin with minerals, 1 tablet, oral, Daily  polyethylene glycol, 17 g, oral, Daily  zonisamide, 200 mg, oral, Daily      Continuous medications     PRN medications  PRN medications: acetaminophen, dextrose, dextrose, glucagon, glucagon, haloperidol lactate, HYDROmorphone, LORazepam **OR** LORazepam **OR** LORazepam, naloxone, ondansetron ODT **OR** ondansetron, oxyCODONE, oxyCODONE      Assessment/Plan:    Rhabdomyolysis  Continue IV fluids  Continue monitoring       ETOH abuse  Monitor for withdrawal symptoms  Thiamine supplement  Folic acid supplement  Scheduled Librium   Haldol for agitation  Psychiatry is following       Fall  PT/OT       Elevated lactic acid level  IV fluids       Neck pain    Closed nondisplaced fracture of sixth cervical vertebra (Multi)  Patient underwent Anterior cervical C6-7 discectomy and fusion with plating  Reduction of fracture  Neurosurgery is following      Altered mental status    Altered sensation in upper extremities  Neurology consult  Neurology recommended to repeat CT scan of the head      Discussed with patient, RN    Toan Arriaga MD   Date: 04/26/24  Time: 12:12 PM    This note was partially created using voice recognition software and is inherently subject to errors including those of syntax and \"sound-alike\" substitutions which may escape proofreading. In such instances, original meaning may be extrapolated by contextual derivation    "

## 2024-04-26 NOTE — PROGRESS NOTES
Attempted to meet with patient to discuss therapy recommendation for skilled care, not appropriate to participate in conversation.   Placed call to patient's  and spoke at length. He said patient is retired  and is aware of substance abuse tx programs but has never participated in one. He believes she went through alcohol withdrawal two years ago in hospital. She went to [facility] after that admission and then home.   He is aware list of skilled facilities at bedside and he will call this worker tomorrow with choices.   RAYO Mcclure

## 2024-04-26 NOTE — NURSING NOTE
Patient alert and oriented to person only this shift,  CIWA protocol maintained.  CIWA scores range from 4-11 this shift. Patient was medicated with ativan per Ciwa scale.  C/O back pain ,  Medicated with oxy 5 mg po at 0412 with good relief.  Seizure precautions and fall precautions maintained.  Safety maintained.

## 2024-04-26 NOTE — CARE PLAN
The patient's goals for the shift include      The clinical goals for the shift include see poc      Problem: Skin  Goal: Decreased wound size/increased tissue granulation at next dressing change  Outcome: Progressing     Problem: Skin  Goal: Prevent/manage excess moisture  Outcome: Progressing     Problem: Skin  Goal: Promote/optimize nutrition  Outcome: Progressing     Problem: Pain  Goal: Turns in bed with improved pain control throughout the shift  Outcome: Progressing     Problem: Pain  Goal: Free from acute confusion related to pain meds throughout the shift  Outcome: Progressing     Problem: Pain - Adult  Goal: Verbalizes/displays adequate comfort level or baseline comfort level  Outcome: Progressing   .

## 2024-04-26 NOTE — PROGRESS NOTES
"Katya Sandhu is a 74 y.o. female on day 3 of admission presenting with Rhabdomyolysis.      Subjective   High CIWA scores,, medicated and lethargic       Objective     Last Recorded Vitals  Blood pressure 117/75, pulse 87, temperature 37.3 °C (99.1 °F), temperature source Temporal, resp. rate 15, height 1.651 m (5' 5\"), weight 54.9 kg (121 lb 0.5 oz), SpO2 96%.    Physical Exam  Neurological Exam  Gen: NAD  Neuro:  --Mental status: A&O X 1, tired, repetition intact;  naming difficulty. Follows simple commands.  --CN:  PERRLA, EOMI, blinks to threat, no visible facial asymmetry, facial sensation intact, no tongue or palatal deviation  --Motor: Moves all 4 extremities equally; no focal deficits  --Sensory: Intact to light touch     --Cerebellum: FTN intact  --Gait: Deferred     Relevant Results  Scheduled medications  chlordiazePOXIDE, 25 mg, oral, q6h   Followed by  [START ON 4/27/2024] chlordiazePOXIDE, 25 mg, oral, q8h   Followed by  [START ON 4/29/2024] chlordiazePOXIDE, 25 mg, oral, q12h  cyclobenzaprine, 5 mg, oral, TID  folic acid, 1 mg, oral, Daily  heparin (porcine), 5,000 Units, subcutaneous, q8h  multivitamin with minerals, 1 tablet, oral, Daily  polyethylene glycol, 17 g, oral, Daily  zonisamide, 200 mg, oral, Daily      Continuous medications     PRN medications  PRN medications: acetaminophen, dextrose, dextrose, glucagon, glucagon, haloperidol lactate, HYDROmorphone, LORazepam **OR** LORazepam **OR** LORazepam, naloxone, ondansetron ODT **OR** ondansetron, oxyCODONE, oxyCODONE  Results for orders placed or performed during the hospital encounter of 04/23/24 (from the past 24 hour(s))   CBC   Result Value Ref Range    WBC 11.8 (H) 4.4 - 11.3 x10*3/uL    nRBC 0.0 0.0 - 0.0 /100 WBCs    RBC 4.34 4.00 - 5.20 x10*6/uL    Hemoglobin 14.2 12.0 - 16.0 g/dL    Hematocrit 43.4 36.0 - 46.0 %     80 - 100 fL    MCH 32.7 26.0 - 34.0 pg    MCHC 32.7 32.0 - 36.0 g/dL    RDW 13.2 11.5 - 14.5 %    " Platelets 147 (L) 150 - 450 x10*3/uL   Basic Metabolic Panel   Result Value Ref Range    Glucose 96 74 - 99 mg/dL    Sodium 135 (L) 136 - 145 mmol/L    Potassium 3.9 3.5 - 5.3 mmol/L    Chloride 104 98 - 107 mmol/L    Bicarbonate 19 (L) 21 - 32 mmol/L    Anion Gap 16 10 - 20 mmol/L    Urea Nitrogen 18 6 - 23 mg/dL    Creatinine 0.51 0.50 - 1.05 mg/dL    eGFR >90 >60 mL/min/1.73m*2    Calcium 8.7 8.6 - 10.3 mg/dL     CT head wo IV contrast    Result Date: 4/26/2024  Interpreted By:  Elan Gilliland, STUDY: CT HEAD WO IV CONTRAST;  4/25/2024 5:20 pm   INDICATION: Signs/Symptoms:perseverating.   COMPARISON: 04/23/2024   ACCESSION NUMBER(S): [id number]   ORDERING CLINICIAN: ALKA GOMEZ   TECHNIQUE: Axial CT images of the head were obtained without intravenous contrast administration.   FINDINGS: There is again evidence of moderate brain parenchymal volume loss   Nonspecific white matter changes are again noted within the cerebral hemispheres bilaterally which while nonspecific, given the patient's age, likely represent sequelae of small-vessel ischemic change.   No hyperdense acute intracranial hemorrhage is noted.   There is no midline shift.   The visualized paranasal sinuses and mastoid air cells are clear.   No acute fracture is noted.   There is persistent but improved extracranial soft tissue swelling noted within the right parietal scalp when compared with 04/23/2024 likely posttraumatic in etiology.       There is again evidence of moderate brain parenchymal volume loss.   Nonspecific white matter changes are again noted within the cerebral hemispheres bilaterally which while nonspecific, given the patient's age, likely represent sequelae of small-vessel ischemic change.   There is persistent but improved extracranial soft tissue swelling noted within the right parietal scalp when compared with 04/23/2024 likely posttraumatic in etiology.   MACRO: None.   Signed by: Elan Gilliland 4/26/2024 6:29 AM  Dictation workstation:   [id number]    FL fluoro images no charge    Result Date: 4/24/2024  These images are not reportable by radiology and will not be interpreted by  Radiologists.    CT head wo IV contrast    Addendum Date: 4/24/2024    Interpreted By:  Guerrero Zurita, ADDENDUM: Especially with the benefit of subsequently performed MRI, review of the preceding CT shows interval new splaying of the posterior elements at C6-7; probably acute fragmentation of the lower tip of the left facet and near perching of facets also on the left. Focal kyphosis at same level. Cord impingement confirmed on subsequent MRI   Signed by: Guerrero Zurita 4/24/2024 3:00 PM   -------- ORIGINAL REPORT -------- Dictation workstation:   [id number]    Result Date: 4/24/2024  Interpreted By:  Guerrero Zurita, STUDY: CT HEAD WO IV CONTRAST; CT CERVICAL SPINE WO IV CONTRAST;  4/23/2024 10:20 am   INDICATION: Signs/Symptoms:Fall head injury.   COMPARISON: CT brain and cervical spine both from 28 December 2023   ACCESSION NUMBER(S): [id number]; [id number]   ORDERING CLINICIAN: CECI HUYNH   TECHNIQUE: CT of the brain from the skull vertex to the skull base, without intravenous contrast   CT cervical spine from the craniocervical junction through the cervicothoracic junction without IV contrast, including sagittal and coronal reformatted images   FINDINGS: CT BRAIN   TRAUMA-RELATED   Brain Injury (BIG) guidelines CT values:   Skull fracture: No SDH (subdural hematoma): None detected EDH (epidural hemtoma): None detected IPH (intraparenchymal hemorrhage): None detected SAH (subarachnoid hemorrhage): None detected IVH (intraventricular hemorrhage): None detected   Reference: Bert CAMPOS, John Paul RS, Madelin M, et al. The BIG (brain injury guidelines) project: defining the management of traumatic brain injury by acute care surgeons. J Trauma Acute Care Surg. 2014;76:307l342.   OTHER   ACUTE INTRACRANIAL MASS EFFECT:  Negative   CT EVIDENCE OF ACUTE  / SUBACUTE TERRITORIAL ISCHEMIA:  Negative   VENTRICLES:  Normal caliber and configuration   OTHER BRAIN FINDINGS:  No additional findings to note   INCLUDED PARANASAL SINUSES: All clear   INCLUDED MASTOID AIR CELLS: All clear   SKULL:  No lytic or blastic lesion   EXTRACRANIAL SOFT TISSUES:  Large right paramidline scalp hematoma. The skin surface over the area of hematoma is cut out of the field of view. Field-of-view contains no subcutaneous gas or subcutaneous radiopaque foreign body   -------   CT CERVICAL SPINE   COUNTING REFERENCE: Craniocervical junction   CRANIOCERVICAL JUNCTION:  Intact   CERVICAL ALIGNMENT:  Anatomic; no acute traumatic alignment abnormality such as subluxation   ACUTE FRACTURE: Negative   AGGRESSIVE OSSEOUS LESION: Negative   BONY CANAL AND FORAMINA:  No significant interval change from prior. This exam was interpreted on a time sensitive basis in the context of trauma, not for radiculopathy as an outpatient   PARASPINAL SOFT TISSUES:  No large acute hematoma or other acute posttraumatic finding   OTHER INCLUDED STRUCTURES:  No acute or contributory soft tissue abnormality in the other cervical and upper thoracic soft tissues       LARGE SCALP HEMATOMA. SKIN SURFACE OVER THE SCALP HEMATOMA IS NOT INCLUDED IN THE FIELD OF VIEW. FIELD-OF-VIEW DOES NOT INCLUDE ANY SUBCUTANEOUS GAS OR SUBCUTANEOUS RADIOPAQUE FOREIGN BODY   NO ACUTE INTRACRANIAL PROCESS. SKULL INTACT   NO ACUTE FRACTURE OR SUBLUXATION IN THE CERVICAL SPINE   THIS REPORT SERVES AS THE DIAGNOSTIC INTERPRETATION FOR TWO EXAMS PERFORMED CONCURRENTLY: CT BRAIN WITHOUT IV CONTRAST AND CT CERVICAL SPINE WITHOUT IV CONTRAST   MACRO: None   Signed by: Guerrero Zurita 4/23/2024 10:42 AM Dictation workstation:   [id number]    CT cervical spine wo IV contrast    Addendum Date: 4/24/2024    Interpreted By:  Guerrero Zurita, ADDENDUM: Especially with the benefit of subsequently performed MRI, review of the preceding CT shows interval new  splaying of the posterior elements at C6-7; probably acute fragmentation of the lower tip of the left facet and near perching of facets also on the left. Focal kyphosis at same level. Cord impingement confirmed on subsequent MRI   Signed by: Guerrero Zurita 4/24/2024 3:00 PM   -------- ORIGINAL REPORT -------- Dictation workstation:   [id number]    Result Date: 4/24/2024  Interpreted By:  Guerrero Zurita, STUDY: CT HEAD WO IV CONTRAST; CT CERVICAL SPINE WO IV CONTRAST;  4/23/2024 10:20 am   INDICATION: Signs/Symptoms:Fall head injury.   COMPARISON: CT brain and cervical spine both from 28 December 2023   ACCESSION NUMBER(S): [id number]; [id number]   ORDERING CLINICIAN: CECI HUYNH   TECHNIQUE: CT of the brain from the skull vertex to the skull base, without intravenous contrast   CT cervical spine from the craniocervical junction through the cervicothoracic junction without IV contrast, including sagittal and coronal reformatted images   FINDINGS: CT BRAIN   TRAUMA-RELATED   Brain Injury (BIG) guidelines CT values:   Skull fracture: No SDH (subdural hematoma): None detected EDH (epidural hemtoma): None detected IPH (intraparenchymal hemorrhage): None detected SAH (subarachnoid hemorrhage): None detected IVH (intraventricular hemorrhage): None detected   Reference: Bert CAMPOS, John Paul RS, Madelin DENSON, et al. The BIG (brain injury guidelines) project: defining the management of traumatic brain injury by acute care surgeons. J Trauma Acute Care Surg. 2014;76:989e961.   OTHER   ACUTE INTRACRANIAL MASS EFFECT:  Negative   CT EVIDENCE OF ACUTE / SUBACUTE TERRITORIAL ISCHEMIA:  Negative   VENTRICLES:  Normal caliber and configuration   OTHER BRAIN FINDINGS:  No additional findings to note   INCLUDED PARANASAL SINUSES: All clear   INCLUDED MASTOID AIR CELLS: All clear   SKULL:  No lytic or blastic lesion   EXTRACRANIAL SOFT TISSUES:  Large right paramidline scalp hematoma. The skin surface over the area of hematoma is cut out of  the field of view. Field-of-view contains no subcutaneous gas or subcutaneous radiopaque foreign body   -------   CT CERVICAL SPINE   COUNTING REFERENCE: Craniocervical junction   CRANIOCERVICAL JUNCTION:  Intact   CERVICAL ALIGNMENT:  Anatomic; no acute traumatic alignment abnormality such as subluxation   ACUTE FRACTURE: Negative   AGGRESSIVE OSSEOUS LESION: Negative   BONY CANAL AND FORAMINA:  No significant interval change from prior. This exam was interpreted on a time sensitive basis in the context of trauma, not for radiculopathy as an outpatient   PARASPINAL SOFT TISSUES:  No large acute hematoma or other acute posttraumatic finding   OTHER INCLUDED STRUCTURES:  No acute or contributory soft tissue abnormality in the other cervical and upper thoracic soft tissues       LARGE SCALP HEMATOMA. SKIN SURFACE OVER THE SCALP HEMATOMA IS NOT INCLUDED IN THE FIELD OF VIEW. FIELD-OF-VIEW DOES NOT INCLUDE ANY SUBCUTANEOUS GAS OR SUBCUTANEOUS RADIOPAQUE FOREIGN BODY   NO ACUTE INTRACRANIAL PROCESS. SKULL INTACT   NO ACUTE FRACTURE OR SUBLUXATION IN THE CERVICAL SPINE   THIS REPORT SERVES AS THE DIAGNOSTIC INTERPRETATION FOR TWO EXAMS PERFORMED CONCURRENTLY: CT BRAIN WITHOUT IV CONTRAST AND CT CERVICAL SPINE WITHOUT IV CONTRAST   MACRO: None   Signed by: Guerrero Zurita 4/23/2024 10:42 AM Dictation workstation:   [id number]    MR cervical spine wo IV contrast    Result Date: 4/23/2024  Interpreted By:  Lisa James, STUDY: MR CERVICAL SPINE WO IV CONTRAST;  4/23/2024 7:47 pm   INDICATION: Signs/Symptoms:Neck pain. S/P fall down flight of steps.   COMPARISON: CT cervical spine 04/23/2024   ACCESSION NUMBER(S): [id number]   ORDERING CLINICIAN: ANA LAURA KEY   TECHNIQUE: Sagittal T1, T2, STIR, axial T1 and axial T2 weighted images were acquired through the cervical spine.   FINDINGS: There is destruction of the ligamentum flavum at the level of C6-C7 best identified on sagittal T2 imaging, with widening of the  interspinous space and mild focal C6-C7 kyphosis. There is facet joint edema and slightly perched facets noted bilaterally. There is anterior buckling of the cortex of the anterior superior C7 vertebral body and minimal prevertebral edema noted. There is minimal irregularity of the posterior longitudinal ligament at C6-C7. Intervertebral disc space edema is noted. Findings results in severe canal stenosis with cord deformity at C6-C7. There is slightly edematous appearance of the cord superior and inferior to this level with mild cord T2/STIR hyperintense signal dorsal to the C7 vertebral body. There is a small dorsal epidural collection which extends from C6-C7 through T1-T2 measuring up to 2 mm (AP). There is associated diffuse paraspinal edema which extends predominately from the level of C6-C7 cranially to the suboccipital region   Mild superior endplate edema noted at T1 without significant vertebral body height loss. Vertebral body and facet joint alignment is otherwise maintained. Intervertebral disc spaces are otherwise maintained.   C1-C2: The cervicomedullary junction appears unremarkable. There is no central canal stenosis.   C2-C3: There is no significant central canal or neural foraminal stenosis.   C3-C4: Mild right foraminal narrowing, otherwise there is no significant central canal or neural foraminal stenosis.   C4-C5: Minimal canal and right foraminal narrowing present. The left foramen is patent.   C5-C6: Mild canal and moderate to severe right foraminal narrowing. No significant left foraminal narrowing.   C6-C7: Canal stenosis an epidural collection as well as additional findings as detailed above.   C7-T1: For is epidural collection. There is no significant central canal or neural foraminal stenosis.       There is traumatic kyphosis at C6-C7 with persistence and disruption of the ligamentum flavum. There is irregularity of the C6-C7 posterior longitudinal ligament with buckling and impaction  anteriorly of the anterior longitudinal ligament and C7 vertebral body. Findings result in severe canal stenosis with secondary cord edema and trace epidural hematoma. Correlation for two-column spinal injury also recommended. Neuro surgical consult recommended.   Endplate edema at T1 concerning for a nondisplaced superior endplate fracture.   Extensive paraspinal edema with probable strain/sprain and mild prevertebral edema.   MACRO: Lisa James discussed the significance and urgency of this critical finding by telephone with  ANA LAURA KEY on 4/23/2024 at 9:58 pm.  (**-RCF-**) Findings:  See findings.   Signed by: Lisa James 4/23/2024 10:02 PM Dictation workstation:   [id number]    CT chest abdomen pelvis w IV contrast    Result Date: 4/23/2024  STUDY: CT Abdomen, and Pelvis with IV Contrast, CT Thoracic Spine and Lumbar Spine without IV Contrast; 4/23/2024 10:23 AM INDICATION: Back pain post fall. COMPARISON: 53/3/2022 XR abdomen. ACCESSION NUMBER(S): [id number], [id number], [id number] ORDERING CLINICIAN: CECI HUYNH TECHNIQUE: CT of the abdomen and pelvis was performed.  Contiguous axial images were obtained at 3 mm slice thickness through the abdomen and pelvis. Coronal and sagittal reconstructions at 3 mm slice thickness were performed.  Omnipaque 350 65 mL was administered intravenously. Please note that spinal images were generated from the original CT abdomen and pelvis imaging. FINDINGS: CHEST: MEDIASTINUM: The heart is normal in size without pericardial effusion.  Central vascular structures opacify normally.  There is prominent fluid collection along the left aspect of the mediastinum measuring 14.4 x 9.8 x 3.8 cm suggesting large pericardial cyst. LUNGS/PLEURA: There is no pleural effusion or pneumothorax.  The airways are patent. There is mild atelectasis adjacent to the left pericardial fluid collection.  There is small cystic focus right middle lobe.  There are no pulmonary nodules  or masses. LYMPH NODES: Thoracic lymph nodes are not enlarged. ABDOMEN:  LIVER: Small hypodensity adjacent to the gallbladder suggests hepatic cyst.  BILE DUCTS: No intrahepatic or extrahepatic biliary ductal dilatation.  GALLBLADDER: The gallbladder is present and demonstrates small dependent stones versus sludge. STOMACH: No abnormalities identified.  PANCREAS: No masses or ductal dilatation.  SPLEEN: No splenomegaly or focal splenic lesion.  ADRENAL GLANDS: No thickening or nodules.  KIDNEYS AND URETERS: Kidneys are normal in size and location.  No renal or ureteral calculi.  Tiny hypodensities within the kidney suggest renal cyst.  PELVIS:  BLADDER: There is a small amount of gas in the anterior aspect of the bladder. Please correlate for recent instrumentation.  REPRODUCTIVE ORGANS: No abnormalities identified.  BOWEL: No abnormalities identified.  The appendix is normal.  VESSELS: No abnormalities identified.  Abdominal aorta is normal in caliber.  PERITONEUM/RETROPERITONEUM/LYMPH NODES: No free fluid.  No pneumoperitoneum. No lymphadenopathy.  ABDOMINAL WALL: No abnormalities identified. SOFT TISSUES: No abnormalities identified.  BONES: No acute fracture or aggressive osseous lesion. THORACIC SPINE: The alignment is anatomic.  There is no fracture or traumatic subluxation.  There is mild disc space narrowing.  There are small anterior marginal osteophytes in the lower thoracic spine extending T8-T10. The vertebral body heights are well maintained.  Disc spaces are preserved.  No significant central canal stenosis is demonstrated. The neural foramina are patent throughout.  The paravertebral soft tissues are within normal limits. LUMBAR SPINE: The alignment is anatomic.  There is no fracture or traumatic subluxation. There is superior endplate concavity and Schmorl's node deformity at L1 with mild anterior wedging which appears chronic.  There is disc space narrowing and mild endplate degenerative changes.   There is mild to moderate facet arthrosis. At L2-3, there is mild annular disc bulge and mild facet arthrosis. There is mild central canal stenosis with mild to moderate bilateral neural foraminal stenosis. At L3-4 there is broad-based disc bulge and moderate facet arthrosis. There is moderate to advanced central canal stenosis and advanced right with moderate to advanced left neural foraminal stenosis. At L4-5 there is broad-based disc bulge and central disc protrusion with facet arthrosis contributing to advanced high-grade central canal stenosis as well as advanced bilateral neural foraminal stenosis. At L5-S1 is broad-based disc bulge and central disc protrusion with facet arthrosis.  There is at least moderate central canal stenosis with moderate to advanced right and mild left neural foraminal stenosis. The paravertebral soft tissues are within normal limits.      Chest: No acute traumatic injury. Large cystic focus along the left aspect of the mediastinum suggesting pericardial cyst.  No other acute cardiopulmonary disease. Abdomen: No acute traumatic injury and no acute inflammatory changes. Gallstones versus sludge within the gallbladder. Pelvis: No acute traumatic injury or inflammatory changes. Small amount gas in the bladder.  Please correlate for recent instrumentation. Thoracic spine: No acute fracture or malalignment. Lumbar spine: No acute fracture or malalignment.  Degenerative changes as described.  Findings are greater at L4-5.  Recommend follow-up MRI for further evaluation unless contraindicated. Signed by Saulo Castillo, DO    CT thoracic spine wo IV contrast    Result Date: 4/23/2024  STUDY: CT Abdomen, and Pelvis with IV Contrast, CT Thoracic Spine and Lumbar Spine without IV Contrast; 4/23/2024 10:23 AM INDICATION: Back pain post fall. COMPARISON: 53/3/2022 XR abdomen. ACCESSION NUMBER(S): [id number], [id number], [id number] ORDERING CLINICIAN: CECI HUYNH TECHNIQUE: CT of the  abdomen and pelvis was performed.  Contiguous axial images were obtained at 3 mm slice thickness through the abdomen and pelvis. Coronal and sagittal reconstructions at 3 mm slice thickness were performed.  Omnipaque 350 65 mL was administered intravenously. Please note that spinal images were generated from the original CT abdomen and pelvis imaging. FINDINGS: CHEST: MEDIASTINUM: The heart is normal in size without pericardial effusion.  Central vascular structures opacify normally.  There is prominent fluid collection along the left aspect of the mediastinum measuring 14.4 x 9.8 x 3.8 cm suggesting large pericardial cyst. LUNGS/PLEURA: There is no pleural effusion or pneumothorax.  The airways are patent. There is mild atelectasis adjacent to the left pericardial fluid collection.  There is small cystic focus right middle lobe.  There are no pulmonary nodules or masses. LYMPH NODES: Thoracic lymph nodes are not enlarged. ABDOMEN:  LIVER: Small hypodensity adjacent to the gallbladder suggests hepatic cyst.  BILE DUCTS: No intrahepatic or extrahepatic biliary ductal dilatation.  GALLBLADDER: The gallbladder is present and demonstrates small dependent stones versus sludge. STOMACH: No abnormalities identified.  PANCREAS: No masses or ductal dilatation.  SPLEEN: No splenomegaly or focal splenic lesion.  ADRENAL GLANDS: No thickening or nodules.  KIDNEYS AND URETERS: Kidneys are normal in size and location.  No renal or ureteral calculi.  Tiny hypodensities within the kidney suggest renal cyst.  PELVIS:  BLADDER: There is a small amount of gas in the anterior aspect of the bladder. Please correlate for recent instrumentation.  REPRODUCTIVE ORGANS: No abnormalities identified.  BOWEL: No abnormalities identified.  The appendix is normal.  VESSELS: No abnormalities identified.  Abdominal aorta is normal in caliber.  PERITONEUM/RETROPERITONEUM/LYMPH NODES: No free fluid.  No pneumoperitoneum. No lymphadenopathy.   ABDOMINAL WALL: No abnormalities identified. SOFT TISSUES: No abnormalities identified.  BONES: No acute fracture or aggressive osseous lesion. THORACIC SPINE: The alignment is anatomic.  There is no fracture or traumatic subluxation.  There is mild disc space narrowing.  There are small anterior marginal osteophytes in the lower thoracic spine extending T8-T10. The vertebral body heights are well maintained.  Disc spaces are preserved.  No significant central canal stenosis is demonstrated. The neural foramina are patent throughout.  The paravertebral soft tissues are within normal limits. LUMBAR SPINE: The alignment is anatomic.  There is no fracture or traumatic subluxation. There is superior endplate concavity and Schmorl's node deformity at L1 with mild anterior wedging which appears chronic.  There is disc space narrowing and mild endplate degenerative changes.  There is mild to moderate facet arthrosis. At L2-3, there is mild annular disc bulge and mild facet arthrosis. There is mild central canal stenosis with mild to moderate bilateral neural foraminal stenosis. At L3-4 there is broad-based disc bulge and moderate facet arthrosis. There is moderate to advanced central canal stenosis and advanced right with moderate to advanced left neural foraminal stenosis. At L4-5 there is broad-based disc bulge and central disc protrusion with facet arthrosis contributing to advanced high-grade central canal stenosis as well as advanced bilateral neural foraminal stenosis. At L5-S1 is broad-based disc bulge and central disc protrusion with facet arthrosis.  There is at least moderate central canal stenosis with moderate to advanced right and mild left neural foraminal stenosis. The paravertebral soft tissues are within normal limits.      Chest: No acute traumatic injury. Large cystic focus along the left aspect of the mediastinum suggesting pericardial cyst.  No other acute cardiopulmonary disease. Abdomen: No acute  traumatic injury and no acute inflammatory changes. Gallstones versus sludge within the gallbladder. Pelvis: No acute traumatic injury or inflammatory changes. Small amount gas in the bladder.  Please correlate for recent instrumentation. Thoracic spine: No acute fracture or malalignment. Lumbar spine: No acute fracture or malalignment.  Degenerative changes as described.  Findings are greater at L4-5.  Recommend follow-up MRI for further evaluation unless contraindicated. Signed by Saulo Castillo, DO    CT lumbar spine wo IV contrast    Result Date: 4/23/2024  STUDY: CT Abdomen, and Pelvis with IV Contrast, CT Thoracic Spine and Lumbar Spine without IV Contrast; 4/23/2024 10:23 AM INDICATION: Back pain post fall. COMPARISON: 53/3/2022 XR abdomen. ACCESSION NUMBER(S): [id number], [id number], [id number] ORDERING CLINICIAN: CECI HUYNH TECHNIQUE: CT of the abdomen and pelvis was performed.  Contiguous axial images were obtained at 3 mm slice thickness through the abdomen and pelvis. Coronal and sagittal reconstructions at 3 mm slice thickness were performed.  Omnipaque 350 65 mL was administered intravenously. Please note that spinal images were generated from the original CT abdomen and pelvis imaging. FINDINGS: CHEST: MEDIASTINUM: The heart is normal in size without pericardial effusion.  Central vascular structures opacify normally.  There is prominent fluid collection along the left aspect of the mediastinum measuring 14.4 x 9.8 x 3.8 cm suggesting large pericardial cyst. LUNGS/PLEURA: There is no pleural effusion or pneumothorax.  The airways are patent. There is mild atelectasis adjacent to the left pericardial fluid collection.  There is small cystic focus right middle lobe.  There are no pulmonary nodules or masses. LYMPH NODES: Thoracic lymph nodes are not enlarged. ABDOMEN:  LIVER: Small hypodensity adjacent to the gallbladder suggests hepatic cyst.  BILE DUCTS: No intrahepatic or extrahepatic  biliary ductal dilatation.  GALLBLADDER: The gallbladder is present and demonstrates small dependent stones versus sludge. STOMACH: No abnormalities identified.  PANCREAS: No masses or ductal dilatation.  SPLEEN: No splenomegaly or focal splenic lesion.  ADRENAL GLANDS: No thickening or nodules.  KIDNEYS AND URETERS: Kidneys are normal in size and location.  No renal or ureteral calculi.  Tiny hypodensities within the kidney suggest renal cyst.  PELVIS:  BLADDER: There is a small amount of gas in the anterior aspect of the bladder. Please correlate for recent instrumentation.  REPRODUCTIVE ORGANS: No abnormalities identified.  BOWEL: No abnormalities identified.  The appendix is normal.  VESSELS: No abnormalities identified.  Abdominal aorta is normal in caliber.  PERITONEUM/RETROPERITONEUM/LYMPH NODES: No free fluid.  No pneumoperitoneum. No lymphadenopathy.  ABDOMINAL WALL: No abnormalities identified. SOFT TISSUES: No abnormalities identified.  BONES: No acute fracture or aggressive osseous lesion. THORACIC SPINE: The alignment is anatomic.  There is no fracture or traumatic subluxation.  There is mild disc space narrowing.  There are small anterior marginal osteophytes in the lower thoracic spine extending T8-T10. The vertebral body heights are well maintained.  Disc spaces are preserved.  No significant central canal stenosis is demonstrated. The neural foramina are patent throughout.  The paravertebral soft tissues are within normal limits. LUMBAR SPINE: The alignment is anatomic.  There is no fracture or traumatic subluxation. There is superior endplate concavity and Schmorl's node deformity at L1 with mild anterior wedging which appears chronic.  There is disc space narrowing and mild endplate degenerative changes.  There is mild to moderate facet arthrosis. At L2-3, there is mild annular disc bulge and mild facet arthrosis. There is mild central canal stenosis with mild to moderate bilateral neural  foraminal stenosis. At L3-4 there is broad-based disc bulge and moderate facet arthrosis. There is moderate to advanced central canal stenosis and advanced right with moderate to advanced left neural foraminal stenosis. At L4-5 there is broad-based disc bulge and central disc protrusion with facet arthrosis contributing to advanced high-grade central canal stenosis as well as advanced bilateral neural foraminal stenosis. At L5-S1 is broad-based disc bulge and central disc protrusion with facet arthrosis.  There is at least moderate central canal stenosis with moderate to advanced right and mild left neural foraminal stenosis. The paravertebral soft tissues are within normal limits.      Chest: No acute traumatic injury. Large cystic focus along the left aspect of the mediastinum suggesting pericardial cyst.  No other acute cardiopulmonary disease. Abdomen: No acute traumatic injury and no acute inflammatory changes. Gallstones versus sludge within the gallbladder. Pelvis: No acute traumatic injury or inflammatory changes. Small amount gas in the bladder.  Please correlate for recent instrumentation. Thoracic spine: No acute fracture or malalignment. Lumbar spine: No acute fracture or malalignment.  Degenerative changes as described.  Findings are greater at L4-5.  Recommend follow-up MRI for further evaluation unless contraindicated. Signed by Saulo Castillo DO    ECG 12 lead    Result Date: 4/23/2024  Normal sinus rhythm Right bundle branch block Abnormal ECG When compared with ECG of 01-MAY-2022 01:32, QRS voltage has increased Nonspecific T wave abnormality no longer evident in Inferior leads T wave amplitude has increased in Anterolateral leads    XR elbow right 3+ views    Result Date: 4/23/2024  STUDY: Elbow Radiographs; 4/23/2024 9:25 AM. INDICATION: Fall injury. COMPARISON: None Available. ACCESSION NUMBER(S): [id number] ORDERING CLINICIAN: CECI HUYNH TECHNIQUE:  Five views of the right elbow.  "FINDINGS:  There is no displaced fracture.  Underlying osteopenia.  Mild to moderate degenerative overhanging osteophytes along the radial head. Changes noted.  The alignment is anatomic.  No soft tissue abnormality is seen.  There is no joint effusion.    No acute osseous abnormality. Signed by Teo Ponce MD                Assessment/Plan      Principal Problem:    Rhabdomyolysis  Active Problems:    ETOH abuse    Fall    Closed head injury    Elevated lactic acid level    Neck pain    Alcohol withdrawal seizure, with unspecified complication (Multi)    Closed nondisplaced fracture of sixth cervical vertebra (Multi)   Encephalopathy  - likely secondary to alcohol withdrawal  - repeat head CT reviewed  - mgmt of alcohol withdrawal per psychiatry  - thiamine, MVT, folic acid  - Continue to observe.     Case/plan discussed and pt seen with Dr. Quinones.           I spent  minutes in the professional and overall care of this patient.      MAXIMILIANO Rizzo-CNP    I saw and evaluated the patient.  I obtained the key portions of the history and examination.  I reviewed the residents/APNs note, discussed the patient and supervised treatment plan formulation.    Subjective:   No overnight events  Started on Librium  Received Ativan just prior to examination    Objective:  /75 (BP Location: Right arm, Patient Position: Sitting)   Pulse 87   Temp 37.3 °C (99.1 °F) (Temporal)   Resp 15   Ht 1.651 m (5' 5\")   Wt 54.9 kg (121 lb 0.5 oz)   LMP  (LMP Unknown)   SpO2 96%   BMI 20.14 kg/m²     Gen: Drowsy  Neuro:  -HIF: Awakens to verbal stim; oriented to person; naming 1/2, repetition inact  --CN:  PERRLA, EOMI, VFF to threat, no visible facial asymmetry  --Motor: Moves all 4 extremities equally  --Sensory: intact to painful stim  --Reflex: 1+ throughout  --Cerbellum: unable to follow commands    CT Brain (I personally reviewed the images/tracings with the following interpretation)  No acute " changes    Assessment:   Encephalopathy  - likely alcohol withdrawal  - continue mgmt per psych  - thiamine/MVT/Folic Acid    Dylon Quinones MD  [facility]  Department of Neurology

## 2024-04-26 NOTE — PROGRESS NOTES
"Katya Sandhu is a 74 y.o. female on day 3 of admission presenting with Rhabdomyolysis.    SUBJECTIVE:  Pt needed 0.5 mg Ativan this morning  Still confused but less perseverating this morning  Disoriented- think that she is in Religion  Sleep was disturbed last night    Exam:  Vital Signs: /75 (BP Location: Right arm, Patient Position: Sitting)   Pulse 87   Temp 37.3 °C (99.1 °F) (Temporal)   Resp 15   Ht 1.651 m (5' 5\")   Wt 54.9 kg (121 lb 0.5 oz)   LMP  (LMP Unknown)   SpO2 96%   BMI 20.14 kg/m²   Musculoskeletal: normal  Gait/Station: in bed      Mental Status Exam:  General Appearance:   Speech: incoherent  Mood: anxious  Affect: anxious  Thought Process: Linear, goal directed, [place]  Thought Associations: Mild loosening of associations  Thought Content:  unable to assess  Perception:  VH  Level of Consciousness: Alert  Orientation:  disoriented  Attention and Concentration: Mild impairment in attention  Cognition:  Insight: poor  Judgment: poor     Results for orders placed or performed during the hospital encounter of 04/23/24 (from the past 96 hour(s))   CBC and Auto Differential   Result Value Ref Range    WBC 12.3 (H) 4.4 - 11.3 x10*3/uL    nRBC 0.0 0.0 - 0.0 /100 WBCs    RBC 3.94 (L) 4.00 - 5.20 x10*6/uL    Hemoglobin 12.9 12.0 - 16.0 g/dL    Hematocrit 39.3 36.0 - 46.0 %     80 - 100 fL    MCH 32.7 26.0 - 34.0 pg    MCHC 32.8 32.0 - 36.0 g/dL    RDW 13.6 11.5 - 14.5 %    Platelets 272 150 - 450 x10*3/uL    Neutrophils % 83.0 40.0 - 80.0 %    Immature Granulocytes %, Automated 0.2 0.0 - 0.9 %    Lymphocytes % 7.3 13.0 - 44.0 %    Monocytes % 9.1 2.0 - 10.0 %    Eosinophils % 0.0 0.0 - 6.0 %    Basophils % 0.4 0.0 - 2.0 %    Neutrophils Absolute 10.18 (H) 1.60 - 5.50 x10*3/uL    Immature Granulocytes Absolute, Automated 0.03 0.00 - 0.50 x10*3/uL    Lymphocytes Absolute 0.89 0.80 - 3.00 x10*3/uL    Monocytes Absolute 1.12 (H) 0.05 - 0.80 x10*3/uL    Eosinophils Absolute 0.00 " 0.00 - 0.40 x10*3/uL    Basophils Absolute 0.05 0.00 - 0.10 x10*3/uL   Comprehensive metabolic panel   Result Value Ref Range    Glucose 128 (H) 74 - 99 mg/dL    Sodium 141 136 - 145 mmol/L    Potassium 4.1 3.5 - 5.3 mmol/L    Chloride 106 98 - 107 mmol/L    Bicarbonate 21 21 - 32 mmol/L    Anion Gap 18 10 - 20 mmol/L    Urea Nitrogen 21 6 - 23 mg/dL    Creatinine 0.57 0.50 - 1.05 mg/dL    eGFR >90 >60 mL/min/1.73m*2    Calcium 9.0 8.6 - 10.3 mg/dL    Albumin 4.7 3.4 - 5.0 g/dL    Alkaline Phosphatase 70 33 - 136 U/L    Total Protein 6.4 6.4 - 8.2 g/dL    AST 46 (H) 9 - 39 U/L    Bilirubin, Total 0.8 0.0 - 1.2 mg/dL    ALT 33 7 - 45 U/L   Lactate   Result Value Ref Range    Lactate 2.4 (H) 0.4 - 2.0 mmol/L   Protime-INR   Result Value Ref Range    Protime 11.3 9.8 - 12.8 seconds    INR 1.0 0.9 - 1.1   Acute Toxicology Panel, Blood   Result Value Ref Range    Acetaminophen 17.2 10.0 - 30.0 ug/mL    Salicylate  <3 4 - 20 mg/dL    Alcohol 89 (H) <=10 mg/dL   Cardiac Enzymes - CPK   Result Value Ref Range    Creatine Kinase 893 (H) 0 - 215 U/L   ECG 12 lead   Result Value Ref Range    Ventricular Rate 80 BPM    Atrial Rate 80 BPM    VT Interval 176 ms    QRS Duration 138 ms    QT Interval 420 ms    QTC Calculation(Bazett) 484 ms    P Axis 79 degrees    R Axis 53 degrees    T Axis 77 degrees    QRS Count 13 beats    Q Onset 222 ms    P Onset 134 ms    P Offset 183 ms    T Offset 432 ms    QTC Fredericia 462 ms   Urinalysis with Reflex Culture and Microscopic   Result Value Ref Range    Color, Urine Yellow Straw, Yellow    Appearance, Urine Clear Clear    Specific Gravity, Urine >1.030 (N) 1.005 - 1.035    pH, Urine 6.0 5.0, 5.5, 6.0, 6.5, 7.0, 7.5, 8.0    Protein, Urine NEGATIVE NEGATIVE, 10 (TRACE) mg/dL    Glucose, Urine NEGATIVE NEGATIVE mg/dL    Blood, Urine NEGATIVE NEGATIVE    Ketones, Urine 10 (1+) (A) NEGATIVE mg/dL    Bilirubin, Urine NEGATIVE NEGATIVE    Urobilinogen, Urine NORM NORM mg/dL    Nitrite, Urine  NEGATIVE NEGATIVE    Leukocyte Esterase, Urine 75 Robert/µL (A) NEGATIVE   Microscopic Only, Urine   Result Value Ref Range    WBC, Urine 11-20 (A) 1-5, NONE /HPF    RBC, Urine 3-5 NONE, 1-2, 3-5 /HPF   Urine Culture    Specimen: Clean Catch/Voided; Urine   Result Value Ref Range    Urine Culture No significant growth    Lactate   Result Value Ref Range    Lactate 2.8 (H) 0.4 - 2.0 mmol/L   Cardiac Enzymes - CPK   Result Value Ref Range    Creatine Kinase 865 (H) 0 - 215 U/L   Lactate   Result Value Ref Range    Lactate 1.9 0.4 - 2.0 mmol/L   Creatine Kinase   Result Value Ref Range    Creatine Kinase 841 (H) 0 - 215 U/L   Troponin I, High Sensitivity   Result Value Ref Range    Troponin I, High Sensitivity 9 0 - 13 ng/L   Ammonia   Result Value Ref Range    Ammonia 28 16 - 53 umol/L   Lavender Top   Result Value Ref Range    Extra Tube Hold for add-ons.    SST TOP   Result Value Ref Range    Extra Tube Hold for add-ons.    Creatine Kinase   Result Value Ref Range    Creatine Kinase 708 (H) 0 - 215 U/L   CBC   Result Value Ref Range    WBC 8.1 4.4 - 11.3 x10*3/uL    nRBC 0.0 0.0 - 0.0 /100 WBCs    RBC 3.65 (L) 4.00 - 5.20 x10*6/uL    Hemoglobin 12.2 12.0 - 16.0 g/dL    Hematocrit 35.9 (L) 36.0 - 46.0 %    MCV 98 80 - 100 fL    MCH 33.4 26.0 - 34.0 pg    MCHC 34.0 32.0 - 36.0 g/dL    RDW 13.4 11.5 - 14.5 %    Platelets 249 150 - 450 x10*3/uL   Basic metabolic panel   Result Value Ref Range    Glucose 153 (H) 74 - 99 mg/dL    Sodium 135 (L) 136 - 145 mmol/L    Potassium 4.0 3.5 - 5.3 mmol/L    Chloride 105 98 - 107 mmol/L    Bicarbonate 20 (L) 21 - 32 mmol/L    Anion Gap 14 10 - 20 mmol/L    Urea Nitrogen 19 6 - 23 mg/dL    Creatinine 0.46 (L) 0.50 - 1.05 mg/dL    eGFR >90 >60 mL/min/1.73m*2    Calcium 8.8 8.6 - 10.3 mg/dL   Creatine Kinase   Result Value Ref Range    Creatine Kinase 482 (H) 0 - 215 U/L   Phosphorus   Result Value Ref Range    Phosphorus 3.7 2.5 - 4.9 mg/dL   Magnesium   Result Value Ref Range     Magnesium 1.71 1.60 - 2.40 mg/dL   Magnesium   Result Value Ref Range    Magnesium 2.14 1.60 - 2.40 mg/dL   Basic Metabolic Panel   Result Value Ref Range    Glucose 126 (H) 74 - 99 mg/dL    Sodium 137 136 - 145 mmol/L    Potassium 3.9 3.5 - 5.3 mmol/L    Chloride 107 98 - 107 mmol/L    Bicarbonate 22 21 - 32 mmol/L    Anion Gap 12 10 - 20 mmol/L    Urea Nitrogen 19 6 - 23 mg/dL    Creatinine 0.65 0.50 - 1.05 mg/dL    eGFR >90 >60 mL/min/1.73m*2    Calcium 8.3 (L) 8.6 - 10.3 mg/dL   Creatine Kinase   Result Value Ref Range    Creatine Kinase 339 (H) 0 - 215 U/L   Lavender Top   Result Value Ref Range    Extra Tube Hold for add-ons.    CBC   Result Value Ref Range    WBC 14.0 (H) 4.4 - 11.3 x10*3/uL    nRBC 0.0 0.0 - 0.0 /100 WBCs    RBC 3.48 (L) 4.00 - 5.20 x10*6/uL    Hemoglobin 11.5 (L) 12.0 - 16.0 g/dL    Hematocrit 34.1 (L) 36.0 - 46.0 %    MCV 98 80 - 100 fL    MCH 33.0 26.0 - 34.0 pg    MCHC 33.7 32.0 - 36.0 g/dL    RDW 13.4 11.5 - 14.5 %    Platelets 273 150 - 450 x10*3/uL   CBC   Result Value Ref Range    WBC 11.8 (H) 4.4 - 11.3 x10*3/uL    nRBC 0.0 0.0 - 0.0 /100 WBCs    RBC 4.34 4.00 - 5.20 x10*6/uL    Hemoglobin 14.2 12.0 - 16.0 g/dL    Hematocrit 43.4 36.0 - 46.0 %     80 - 100 fL    MCH 32.7 26.0 - 34.0 pg    MCHC 32.7 32.0 - 36.0 g/dL    RDW 13.2 11.5 - 14.5 %    Platelets 147 (L) 150 - 450 x10*3/uL   Basic Metabolic Panel   Result Value Ref Range    Glucose 96 74 - 99 mg/dL    Sodium 135 (L) 136 - 145 mmol/L    Potassium 3.9 3.5 - 5.3 mmol/L    Chloride 104 98 - 107 mmol/L    Bicarbonate 19 (L) 21 - 32 mmol/L    Anion Gap 16 10 - 20 mmol/L    Urea Nitrogen 18 6 - 23 mg/dL    Creatinine 0.51 0.50 - 1.05 mg/dL    eGFR >90 >60 mL/min/1.73m*2    Calcium 8.7 8.6 - 10.3 mg/dL         Impression:   Alcohol use disorder  Delirium    Recommendations:    1. Pt is on scheduled librium taper. DC CIWA ativan- at risk of getting too much benzos    2. PRN ativan for extreme agitation or breakthrough w/d    3.  Melatonin for sleep ordered    4. Haldol for agitation    Thank you for allowing us to participate in the care of this patient.       Yury Mohamud MD  4/26/2024  2:36 PM2

## 2024-04-27 LAB
ANION GAP SERPL CALC-SCNC: 13 MMOL/L (ref 10–20)
BUN SERPL-MCNC: 21 MG/DL (ref 6–23)
CALCIUM SERPL-MCNC: 8.7 MG/DL (ref 8.6–10.3)
CHLORIDE SERPL-SCNC: 104 MMOL/L (ref 98–107)
CO2 SERPL-SCNC: 21 MMOL/L (ref 21–32)
CREAT SERPL-MCNC: 0.5 MG/DL (ref 0.5–1.05)
EGFRCR SERPLBLD CKD-EPI 2021: >90 ML/MIN/1.73M*2
ERYTHROCYTE [DISTWIDTH] IN BLOOD BY AUTOMATED COUNT: 13.1 % (ref 11.5–14.5)
GLUCOSE BLD MANUAL STRIP-MCNC: 100 MG/DL (ref 74–99)
GLUCOSE SERPL-MCNC: 105 MG/DL (ref 74–99)
HCT VFR BLD AUTO: 38.1 % (ref 36–46)
HGB BLD-MCNC: 13 G/DL (ref 12–16)
MCH RBC QN AUTO: 33.3 PG (ref 26–34)
MCHC RBC AUTO-ENTMCNC: 34.1 G/DL (ref 32–36)
MCV RBC AUTO: 98 FL (ref 80–100)
NRBC BLD-RTO: 0 /100 WBCS (ref 0–0)
PLATELET # BLD AUTO: 252 X10*3/UL (ref 150–450)
POTASSIUM SERPL-SCNC: 3.5 MMOL/L (ref 3.5–5.3)
RBC # BLD AUTO: 3.9 X10*6/UL (ref 4–5.2)
SODIUM SERPL-SCNC: 134 MMOL/L (ref 136–145)
WBC # BLD AUTO: 9.3 X10*3/UL (ref 4.4–11.3)

## 2024-04-27 PROCEDURE — 85027 COMPLETE CBC AUTOMATED: CPT | Performed by: NURSE PRACTITIONER

## 2024-04-27 PROCEDURE — 1200000002 HC GENERAL ROOM WITH TELEMETRY DAILY

## 2024-04-27 PROCEDURE — 2500000004 HC RX 250 GENERAL PHARMACY W/ HCPCS (ALT 636 FOR OP/ED): Performed by: NURSE PRACTITIONER

## 2024-04-27 PROCEDURE — 80048 BASIC METABOLIC PNL TOTAL CA: CPT | Performed by: NURSE PRACTITIONER

## 2024-04-27 PROCEDURE — 2500000006 HC RX 250 W HCPCS SELF ADMINISTERED DRUGS (ALT 637 FOR ALL PAYERS): Performed by: STUDENT IN AN ORGANIZED HEALTH CARE EDUCATION/TRAINING PROGRAM

## 2024-04-27 PROCEDURE — 82947 ASSAY GLUCOSE BLOOD QUANT: CPT

## 2024-04-27 PROCEDURE — 99232 SBSQ HOSP IP/OBS MODERATE 35: CPT | Performed by: PSYCHIATRY & NEUROLOGY

## 2024-04-27 PROCEDURE — 2500000001 HC RX 250 WO HCPCS SELF ADMINISTERED DRUGS (ALT 637 FOR MEDICARE OP): Performed by: NURSE PRACTITIONER

## 2024-04-27 PROCEDURE — 36415 COLL VENOUS BLD VENIPUNCTURE: CPT | Performed by: NURSE PRACTITIONER

## 2024-04-27 PROCEDURE — 2500000001 HC RX 250 WO HCPCS SELF ADMINISTERED DRUGS (ALT 637 FOR MEDICARE OP): Performed by: STUDENT IN AN ORGANIZED HEALTH CARE EDUCATION/TRAINING PROGRAM

## 2024-04-27 PROCEDURE — 2500000001 HC RX 250 WO HCPCS SELF ADMINISTERED DRUGS (ALT 637 FOR MEDICARE OP): Performed by: PSYCHIATRY & NEUROLOGY

## 2024-04-27 PROCEDURE — 2500000004 HC RX 250 GENERAL PHARMACY W/ HCPCS (ALT 636 FOR OP/ED): Performed by: STUDENT IN AN ORGANIZED HEALTH CARE EDUCATION/TRAINING PROGRAM

## 2024-04-27 RX ORDER — POTASSIUM CHLORIDE 1.5 G/1.58G
40 POWDER, FOR SOLUTION ORAL ONCE
Status: COMPLETED | OUTPATIENT
Start: 2024-04-27 | End: 2024-04-27

## 2024-04-27 RX ORDER — POTASSIUM CHLORIDE 20 MEQ/1
40 TABLET, EXTENDED RELEASE ORAL ONCE
Status: DISCONTINUED | OUTPATIENT
Start: 2024-04-27 | End: 2024-04-27

## 2024-04-27 RX ORDER — SODIUM CHLORIDE, SODIUM LACTATE, POTASSIUM CHLORIDE, CALCIUM CHLORIDE 600; 310; 30; 20 MG/100ML; MG/100ML; MG/100ML; MG/100ML
75 INJECTION, SOLUTION INTRAVENOUS CONTINUOUS
Status: DISCONTINUED | OUTPATIENT
Start: 2024-04-27 | End: 2024-05-01 | Stop reason: HOSPADM

## 2024-04-27 RX ADMIN — HEPARIN SODIUM 5000 UNITS: 5000 INJECTION INTRAVENOUS; SUBCUTANEOUS at 15:23

## 2024-04-27 RX ADMIN — CYCLOBENZAPRINE HYDROCHLORIDE 5 MG: 5 TABLET, FILM COATED ORAL at 10:48

## 2024-04-27 RX ADMIN — Medication 1 TABLET: at 10:48

## 2024-04-27 RX ADMIN — FOLIC ACID 1 MG: 1 TABLET ORAL at 10:48

## 2024-04-27 RX ADMIN — CYCLOBENZAPRINE HYDROCHLORIDE 5 MG: 5 TABLET, FILM COATED ORAL at 21:33

## 2024-04-27 RX ADMIN — HEPARIN SODIUM 5000 UNITS: 5000 INJECTION INTRAVENOUS; SUBCUTANEOUS at 23:34

## 2024-04-27 RX ADMIN — Medication 5 MG: at 21:33

## 2024-04-27 RX ADMIN — ZONISAMIDE 200 MG: 100 CAPSULE ORAL at 10:48

## 2024-04-27 RX ADMIN — CHLORDIAZEPOXIDE HYDROCHLORIDE 25 MG: 25 CAPSULE ORAL at 15:23

## 2024-04-27 RX ADMIN — POTASSIUM CHLORIDE 40 MEQ: 1.5 POWDER, FOR SOLUTION ORAL at 11:08

## 2024-04-27 RX ADMIN — SODIUM CHLORIDE, POTASSIUM CHLORIDE, SODIUM LACTATE AND CALCIUM CHLORIDE 75 ML/HR: 600; 310; 30; 20 INJECTION, SOLUTION INTRAVENOUS at 16:05

## 2024-04-27 RX ADMIN — CHLORDIAZEPOXIDE HYDROCHLORIDE 25 MG: 25 CAPSULE ORAL at 04:21

## 2024-04-27 RX ADMIN — ACETAMINOPHEN 650 MG: 325 TABLET ORAL at 21:33

## 2024-04-27 RX ADMIN — CHLORDIAZEPOXIDE HYDROCHLORIDE 25 MG: 25 CAPSULE ORAL at 23:34

## 2024-04-27 RX ADMIN — HEPARIN SODIUM 5000 UNITS: 5000 INJECTION INTRAVENOUS; SUBCUTANEOUS at 00:50

## 2024-04-27 RX ADMIN — CHLORDIAZEPOXIDE HYDROCHLORIDE 25 MG: 25 CAPSULE ORAL at 09:55

## 2024-04-27 RX ADMIN — OXYCODONE HYDROCHLORIDE 10 MG: 10 TABLET ORAL at 04:20

## 2024-04-27 RX ADMIN — HEPARIN SODIUM 5000 UNITS: 5000 INJECTION INTRAVENOUS; SUBCUTANEOUS at 10:39

## 2024-04-27 ASSESSMENT — COGNITIVE AND FUNCTIONAL STATUS - GENERAL
STANDING UP FROM CHAIR USING ARMS: A LOT
WALKING IN HOSPITAL ROOM: TOTAL
MOVING TO AND FROM BED TO CHAIR: A LOT
DRESSING REGULAR UPPER BODY CLOTHING: TOTAL
MOVING TO AND FROM BED TO CHAIR: TOTAL
TURNING FROM BACK TO SIDE WHILE IN FLAT BAD: TOTAL
WALKING IN HOSPITAL ROOM: TOTAL
MOVING FROM LYING ON BACK TO SITTING ON SIDE OF FLAT BED WITH BEDRAILS: TOTAL
PERSONAL GROOMING: TOTAL
EATING MEALS: A LOT
MOBILITY SCORE: 10
DRESSING REGULAR UPPER BODY CLOTHING: A LOT
DAILY ACTIVITIY SCORE: 6
DRESSING REGULAR LOWER BODY CLOTHING: A LOT
CLIMB 3 TO 5 STEPS WITH RAILING: TOTAL
CLIMB 3 TO 5 STEPS WITH RAILING: TOTAL
TOILETING: TOTAL
PERSONAL GROOMING: A LOT
DRESSING REGULAR LOWER BODY CLOTHING: TOTAL
TOILETING: A LOT
DAILY ACTIVITIY SCORE: 12
MOBILITY SCORE: 6
MOVING FROM LYING ON BACK TO SITTING ON SIDE OF FLAT BED WITH BEDRAILS: A LOT
EATING MEALS: TOTAL
STANDING UP FROM CHAIR USING ARMS: TOTAL
TURNING FROM BACK TO SIDE WHILE IN FLAT BAD: A LOT
HELP NEEDED FOR BATHING: A LOT
HELP NEEDED FOR BATHING: TOTAL

## 2024-04-27 ASSESSMENT — PAIN - FUNCTIONAL ASSESSMENT: PAIN_FUNCTIONAL_ASSESSMENT: 0-10

## 2024-04-27 ASSESSMENT — PAIN DESCRIPTION - LOCATION
LOCATION: BACK
LOCATION: NECK

## 2024-04-27 ASSESSMENT — PAIN SCALES - GENERAL
PAINLEVEL_OUTOF10: 4
PAINLEVEL_OUTOF10: 9

## 2024-04-27 ASSESSMENT — PAIN SCALES - WONG BAKER: WONGBAKER_NUMERICALRESPONSE: NO HURT

## 2024-04-27 ASSESSMENT — PAIN DESCRIPTION - ORIENTATION: ORIENTATION: UPPER

## 2024-04-27 NOTE — NURSING NOTE
Shift note - patient admitted with rhabdo s/p fall.  Right neck surgical dressing clean, dry and intact.  No change in neurovascular assessment reported or observed this shift.  Pt confused but pleasant and followed all commands.  C/o back pain, especially with movement.  PRN meds given, which improved the pain after administration.  ETOH withdrawal unchanged - Librium, along will all other scheduled medications, given as ordered and without issue.  The patient's vitals were stable and call light in reach.  Comfort/safety rounds completed.  Continue with plan of care, including DC planning.

## 2024-04-27 NOTE — PROGRESS NOTES
Received call from patient's . He advised skilled choice is [facility] because patient was there two years ago. He does not have a second choice at this time. Asked him to review list for backup choice.   Referral sent to [facility] to review.    [phone] but will need to due on site visit prior to discharge.   RAYO Mcclure

## 2024-04-27 NOTE — PROGRESS NOTES
"Katya Sandhu is a 74 y.o. female on day 4 of admission presenting with Rhabdomyolysis.    SUBJECTIVE:    Pt is sedated but her vitals are stable  Librium is being tapered and tolerating well  NO ativan was needed from prn   at bedside- updated the detox plan  Exam:  Vital Signs: /71 (Patient Position: Lying)   Pulse 75   Temp 36.5 °C (97.7 °F) (Temporal)   Resp 17   Ht 1.651 m (5' 5\")   Wt 54.9 kg (121 lb 0.5 oz)   LMP  (LMP Unknown)   SpO2 100%   BMI 20.14 kg/m²   Musculoskeletal: normal  Gait/Station: in bed      Mental Status Exam:  General Appearance:   Speech: incoherent  Mood: anxious  Affect: anxious  Thought Process: Linear, goal directed, [place]  Thought Associations: Mild loosening of associations  Thought Content:  unable to assess  Perception:  VH  Level of Consciousness: Alert  Orientation:  disoriented  Attention and Concentration: Mild impairment in attention  Cognition:  Insight: poor  Judgment: poor     Results for orders placed or performed during the hospital encounter of 04/23/24 (from the past 96 hour(s))   Lactate   Result Value Ref Range    Lactate 1.9 0.4 - 2.0 mmol/L   Creatine Kinase   Result Value Ref Range    Creatine Kinase 841 (H) 0 - 215 U/L   Troponin I, High Sensitivity   Result Value Ref Range    Troponin I, High Sensitivity 9 0 - 13 ng/L   Ammonia   Result Value Ref Range    Ammonia 28 16 - 53 umol/L   Lavender Top   Result Value Ref Range    Extra Tube Hold for add-ons.    SST TOP   Result Value Ref Range    Extra Tube Hold for add-ons.    Creatine Kinase   Result Value Ref Range    Creatine Kinase 708 (H) 0 - 215 U/L   CBC   Result Value Ref Range    WBC 8.1 4.4 - 11.3 x10*3/uL    nRBC 0.0 0.0 - 0.0 /100 WBCs    RBC 3.65 (L) 4.00 - 5.20 x10*6/uL    Hemoglobin 12.2 12.0 - 16.0 g/dL    Hematocrit 35.9 (L) 36.0 - 46.0 %    MCV 98 80 - 100 fL    MCH 33.4 26.0 - 34.0 pg    MCHC 34.0 32.0 - 36.0 g/dL    RDW 13.4 11.5 - 14.5 %    Platelets 249 150 - 450 " x10*3/uL   Basic metabolic panel   Result Value Ref Range    Glucose 153 (H) 74 - 99 mg/dL    Sodium 135 (L) 136 - 145 mmol/L    Potassium 4.0 3.5 - 5.3 mmol/L    Chloride 105 98 - 107 mmol/L    Bicarbonate 20 (L) 21 - 32 mmol/L    Anion Gap 14 10 - 20 mmol/L    Urea Nitrogen 19 6 - 23 mg/dL    Creatinine 0.46 (L) 0.50 - 1.05 mg/dL    eGFR >90 >60 mL/min/1.73m*2    Calcium 8.8 8.6 - 10.3 mg/dL   Creatine Kinase   Result Value Ref Range    Creatine Kinase 482 (H) 0 - 215 U/L   Phosphorus   Result Value Ref Range    Phosphorus 3.7 2.5 - 4.9 mg/dL   Magnesium   Result Value Ref Range    Magnesium 1.71 1.60 - 2.40 mg/dL   Magnesium   Result Value Ref Range    Magnesium 2.14 1.60 - 2.40 mg/dL   Basic Metabolic Panel   Result Value Ref Range    Glucose 126 (H) 74 - 99 mg/dL    Sodium 137 136 - 145 mmol/L    Potassium 3.9 3.5 - 5.3 mmol/L    Chloride 107 98 - 107 mmol/L    Bicarbonate 22 21 - 32 mmol/L    Anion Gap 12 10 - 20 mmol/L    Urea Nitrogen 19 6 - 23 mg/dL    Creatinine 0.65 0.50 - 1.05 mg/dL    eGFR >90 >60 mL/min/1.73m*2    Calcium 8.3 (L) 8.6 - 10.3 mg/dL   Creatine Kinase   Result Value Ref Range    Creatine Kinase 339 (H) 0 - 215 U/L   Lavender Top   Result Value Ref Range    Extra Tube Hold for add-ons.    CBC   Result Value Ref Range    WBC 14.0 (H) 4.4 - 11.3 x10*3/uL    nRBC 0.0 0.0 - 0.0 /100 WBCs    RBC 3.48 (L) 4.00 - 5.20 x10*6/uL    Hemoglobin 11.5 (L) 12.0 - 16.0 g/dL    Hematocrit 34.1 (L) 36.0 - 46.0 %    MCV 98 80 - 100 fL    MCH 33.0 26.0 - 34.0 pg    MCHC 33.7 32.0 - 36.0 g/dL    RDW 13.4 11.5 - 14.5 %    Platelets 273 150 - 450 x10*3/uL   CBC   Result Value Ref Range    WBC 11.8 (H) 4.4 - 11.3 x10*3/uL    nRBC 0.0 0.0 - 0.0 /100 WBCs    RBC 4.34 4.00 - 5.20 x10*6/uL    Hemoglobin 14.2 12.0 - 16.0 g/dL    Hematocrit 43.4 36.0 - 46.0 %     80 - 100 fL    MCH 32.7 26.0 - 34.0 pg    MCHC 32.7 32.0 - 36.0 g/dL    RDW 13.2 11.5 - 14.5 %    Platelets 147 (L) 150 - 450 x10*3/uL   Basic  Metabolic Panel   Result Value Ref Range    Glucose 96 74 - 99 mg/dL    Sodium 135 (L) 136 - 145 mmol/L    Potassium 3.9 3.5 - 5.3 mmol/L    Chloride 104 98 - 107 mmol/L    Bicarbonate 19 (L) 21 - 32 mmol/L    Anion Gap 16 10 - 20 mmol/L    Urea Nitrogen 18 6 - 23 mg/dL    Creatinine 0.51 0.50 - 1.05 mg/dL    eGFR >90 >60 mL/min/1.73m*2    Calcium 8.7 8.6 - 10.3 mg/dL   CBC   Result Value Ref Range    WBC 9.3 4.4 - 11.3 x10*3/uL    nRBC 0.0 0.0 - 0.0 /100 WBCs    RBC 3.90 (L) 4.00 - 5.20 x10*6/uL    Hemoglobin 13.0 12.0 - 16.0 g/dL    Hematocrit 38.1 36.0 - 46.0 %    MCV 98 80 - 100 fL    MCH 33.3 26.0 - 34.0 pg    MCHC 34.1 32.0 - 36.0 g/dL    RDW 13.1 11.5 - 14.5 %    Platelets 252 150 - 450 x10*3/uL   Basic Metabolic Panel   Result Value Ref Range    Glucose 105 (H) 74 - 99 mg/dL    Sodium 134 (L) 136 - 145 mmol/L    Potassium 3.5 3.5 - 5.3 mmol/L    Chloride 104 98 - 107 mmol/L    Bicarbonate 21 21 - 32 mmol/L    Anion Gap 13 10 - 20 mmol/L    Urea Nitrogen 21 6 - 23 mg/dL    Creatinine 0.50 0.50 - 1.05 mg/dL    eGFR >90 >60 mL/min/1.73m*2    Calcium 8.7 8.6 - 10.3 mg/dL         Impression:   Alcohol use disorder  Delirium    Recommendations:    1. Pt is on scheduled librium taper.     2. PRN ativan for extreme agitation or breakthrough w/d    3. Melatonin for sleep ordered    4. Haldol for agitation    Thank you for allowing us to participate in the care of this patient.       Yury Mohamud MD  4/27/2024  11:22 AM2

## 2024-04-27 NOTE — CARE PLAN
Problem: Skin  Goal: Participates in plan/prevention/treatment measures  Outcome: Progressing  Goal: Prevent/manage excess moisture  Outcome: Progressing  Goal: Prevent/minimize sheer/friction injuries  Outcome: Progressing     Problem: Pain  Goal: Turns in bed with improved pain control throughout the shift  Outcome: Progressing  Goal: Performs ADL's with improved pain control throughout shift  Outcome: Progressing     Problem: Pain - Adult  Goal: Verbalizes/displays adequate comfort level or baseline comfort level  Outcome: Progressing     Problem: Dressings Lower Extremities  Goal: STG - Patient to complete lower body dressing SUP  Outcome: Progressing     Problem: Grooming  Goal: STG - Patient completes grooming SUP  Outcome: Progressing  Goal: STG - Patient will tolerate standing 2-4min  Outcome: Progressing   The patient's goals for the shift include      No acute events occurred and safety was maintained. Pts husbands updated via bedside throughout shift while he was here; he seems resistive to education and doesn't believe that she is needs to be medicated for withdrawal. Pt resting in chair with call light within reach.    Juileta Richard RN

## 2024-04-27 NOTE — CARE PLAN
The patient's goals for the shift include      The clinical goals for the shift include See poc taken 4/27    Pt has been lethargic throughout shift.  Librium taper continues.    Patient is more alert this afternoon/evening.  Poor intake and fluids started.  Visited by her .  Estrella remains in place for retention.

## 2024-04-27 NOTE — CARE PLAN
The patient's goals for the shift include managing pain within a tolerable level.     The clinical goals for the shift include remain hemodynamically stable.

## 2024-04-27 NOTE — PROGRESS NOTES
Patient seen this morning. Asleep but easily arousable.    Exam  Awake  Ox3  4/5x4  Incision cdi    A&P  Patient is POD 2 from C6-C7 ACDF due to trauma  No further neurosurgical needs at this point  Patient currently having Etoh withdrawal  Neurosurgery is signing off  Patient will need two week follow up with Narendra JON in clinic for wound check  Please page us if you have any questions

## 2024-04-27 NOTE — PROGRESS NOTES
Internal Medicine Progress Note    Patient Name: Katya Sandhu          MRN: [id number]  Today's Date: April 27, 2024          Attending: Toan Arriaga MD    Subjective:  Patient was seen and examined at bedside, pateint is drowsy.    Review Of Systems:  Unable to obtain due to mental status    Objective:  Vitals:    04/26/24 2000 04/27/24 0000 04/27/24 0400 04/27/24 0800   BP: 120/76 114/75 133/74 107/71   BP Location: Right arm Right arm Right arm    Patient Position: Sitting Lying Lying Lying   Pulse: 100 94 82 75   Resp: 18 18 20 17   Temp: 37.6 °C (99.7 °F) 37.8 °C (100 °F) 36.5 °C (97.7 °F) 36.5 °C (97.7 °F)   TempSrc: Temporal Temporal Temporal Temporal   SpO2: 98% 97% 98% 100%   Weight:       Height:           Physical Exam:   General appearance: Well-appearing alert, in no acute distress  Neck: Surgical wound in the anterior aspect of neck   Lungs: Clear to auscultation, no wheezing or rhonchi  Heart: RRR without murmur, gallop, or rubs.   Abdomen: Soft, non-tender.  Neuro: Deferred    Labs:  Results for orders placed or performed during the hospital encounter of 04/23/24 (from the past 24 hour(s))   CBC   Result Value Ref Range    WBC 9.3 4.4 - 11.3 x10*3/uL    nRBC 0.0 0.0 - 0.0 /100 WBCs    RBC 3.90 (L) 4.00 - 5.20 x10*6/uL    Hemoglobin 13.0 12.0 - 16.0 g/dL    Hematocrit 38.1 36.0 - 46.0 %    MCV 98 80 - 100 fL    MCH 33.3 26.0 - 34.0 pg    MCHC 34.1 32.0 - 36.0 g/dL    RDW 13.1 11.5 - 14.5 %    Platelets 252 150 - 450 x10*3/uL   Basic Metabolic Panel   Result Value Ref Range    Glucose 105 (H) 74 - 99 mg/dL    Sodium 134 (L) 136 - 145 mmol/L    Potassium 3.5 3.5 - 5.3 mmol/L    Chloride 104 98 - 107 mmol/L    Bicarbonate 21 21 - 32 mmol/L    Anion Gap 13 10 - 20 mmol/L    Urea Nitrogen 21 6 - 23 mg/dL    Creatinine 0.50 0.50 - 1.05 mg/dL    eGFR >90 >60 mL/min/1.73m*2    Calcium 8.7 8.6 - 10.3 mg/dL       Medications:  Scheduled medications  chlordiazePOXIDE, 25 mg, oral, q8h   Followed  "by  [START ON 4/29/2024] chlordiazePOXIDE, 25 mg, oral, q12h  cyclobenzaprine, 5 mg, oral, TID  folic acid, 1 mg, oral, Daily  heparin (porcine), 5,000 Units, subcutaneous, q8h  melatonin, 5 mg, oral, Nightly  multivitamin with minerals, 1 tablet, oral, Daily  polyethylene glycol, 17 g, oral, Daily  zonisamide, 200 mg, oral, Daily      Continuous medications     PRN medications  PRN medications: acetaminophen, dextrose, dextrose, glucagon, glucagon, haloperidol lactate, HYDROmorphone, LORazepam, naloxone, ondansetron ODT **OR** ondansetron, oxyCODONE, oxyCODONE      Assessment/Plan:    Rhabdomyolysis  Continue IV fluids  Continue monitoring       ETOH abuse  Monitor for withdrawal symptoms  Thiamine supplement  Folic acid supplement  Continue Librium   Haldol for agitation  Psychiatry is following       Fall  PT/OT       Elevated lactic acid level  IV fluids       Neck pain    Closed nondisplaced fracture of sixth cervical vertebra (Multi)  Patient underwent Anterior cervical C6-7 discectomy and fusion with plating  Reduction of fracture  Neurosurgery is following      Altered mental status    Altered sensation in upper extremities  Continue monitoring      Discussed with patient, RN    Toan Arriaga MD   Date: 04/27/24  Time: 11:19 AM    This note was partially created using voice recognition software and is inherently subject to errors including those of syntax and \"sound-alike\" substitutions which may escape proofreading. In such instances, original meaning may be extrapolated by contextual derivation    "

## 2024-04-27 NOTE — PROGRESS NOTES
Occupational Therapy                 Therapy Communication Note    Patient Name: Katya Sandhu  MRN: [id number]  Today's Date: 4/27/2024     Discipline: Occupational Therapy    Missed Visit Reason: Patient placed on medical hold (Per RN, hold this date. patient having medications adjusted and not  appropriate this date.)

## 2024-04-28 LAB
ANION GAP SERPL CALC-SCNC: 15 MMOL/L (ref 10–20)
BUN SERPL-MCNC: 19 MG/DL (ref 6–23)
CALCIUM SERPL-MCNC: 8.6 MG/DL (ref 8.6–10.3)
CHLORIDE SERPL-SCNC: 105 MMOL/L (ref 98–107)
CO2 SERPL-SCNC: 21 MMOL/L (ref 21–32)
CREAT SERPL-MCNC: 0.5 MG/DL (ref 0.5–1.05)
EGFRCR SERPLBLD CKD-EPI 2021: >90 ML/MIN/1.73M*2
ERYTHROCYTE [DISTWIDTH] IN BLOOD BY AUTOMATED COUNT: 12.9 % (ref 11.5–14.5)
GLUCOSE SERPL-MCNC: 111 MG/DL (ref 74–99)
HCT VFR BLD AUTO: 35.9 % (ref 36–46)
HGB BLD-MCNC: 12.3 G/DL (ref 12–16)
MCH RBC QN AUTO: 33.3 PG (ref 26–34)
MCHC RBC AUTO-ENTMCNC: 34.3 G/DL (ref 32–36)
MCV RBC AUTO: 97 FL (ref 80–100)
NRBC BLD-RTO: 0 /100 WBCS (ref 0–0)
PLATELET # BLD AUTO: 266 X10*3/UL (ref 150–450)
POTASSIUM SERPL-SCNC: 3.6 MMOL/L (ref 3.5–5.3)
RBC # BLD AUTO: 3.69 X10*6/UL (ref 4–5.2)
SODIUM SERPL-SCNC: 137 MMOL/L (ref 136–145)
WBC # BLD AUTO: 12.2 X10*3/UL (ref 4.4–11.3)

## 2024-04-28 PROCEDURE — 82374 ASSAY BLOOD CARBON DIOXIDE: CPT | Performed by: NURSE PRACTITIONER

## 2024-04-28 PROCEDURE — 85027 COMPLETE CBC AUTOMATED: CPT | Performed by: NURSE PRACTITIONER

## 2024-04-28 PROCEDURE — 1200000002 HC GENERAL ROOM WITH TELEMETRY DAILY

## 2024-04-28 PROCEDURE — 2500000006 HC RX 250 W HCPCS SELF ADMINISTERED DRUGS (ALT 637 FOR ALL PAYERS): Performed by: STUDENT IN AN ORGANIZED HEALTH CARE EDUCATION/TRAINING PROGRAM

## 2024-04-28 PROCEDURE — 2500000006 HC RX 250 W HCPCS SELF ADMINISTERED DRUGS (ALT 637 FOR ALL PAYERS): Performed by: NURSE PRACTITIONER

## 2024-04-28 PROCEDURE — 2500000001 HC RX 250 WO HCPCS SELF ADMINISTERED DRUGS (ALT 637 FOR MEDICARE OP): Performed by: STUDENT IN AN ORGANIZED HEALTH CARE EDUCATION/TRAINING PROGRAM

## 2024-04-28 PROCEDURE — 2500000001 HC RX 250 WO HCPCS SELF ADMINISTERED DRUGS (ALT 637 FOR MEDICARE OP): Performed by: PSYCHIATRY & NEUROLOGY

## 2024-04-28 PROCEDURE — 99232 SBSQ HOSP IP/OBS MODERATE 35: CPT | Performed by: PSYCHIATRY & NEUROLOGY

## 2024-04-28 PROCEDURE — 36415 COLL VENOUS BLD VENIPUNCTURE: CPT | Performed by: NURSE PRACTITIONER

## 2024-04-28 PROCEDURE — 2500000004 HC RX 250 GENERAL PHARMACY W/ HCPCS (ALT 636 FOR OP/ED): Performed by: NURSE PRACTITIONER

## 2024-04-28 PROCEDURE — 2500000004 HC RX 250 GENERAL PHARMACY W/ HCPCS (ALT 636 FOR OP/ED): Performed by: STUDENT IN AN ORGANIZED HEALTH CARE EDUCATION/TRAINING PROGRAM

## 2024-04-28 RX ORDER — POTASSIUM CHLORIDE 20 MEQ/1
40 TABLET, EXTENDED RELEASE ORAL ONCE
Status: COMPLETED | OUTPATIENT
Start: 2024-04-28 | End: 2024-04-28

## 2024-04-28 RX ADMIN — POLYETHYLENE GLYCOL 3350 17 G: 17 POWDER, FOR SOLUTION ORAL at 09:55

## 2024-04-28 RX ADMIN — SODIUM CHLORIDE, POTASSIUM CHLORIDE, SODIUM LACTATE AND CALCIUM CHLORIDE 75 ML/HR: 600; 310; 30; 20 INJECTION, SOLUTION INTRAVENOUS at 15:23

## 2024-04-28 RX ADMIN — OXYCODONE HYDROCHLORIDE 5 MG: 5 TABLET ORAL at 05:05

## 2024-04-28 RX ADMIN — CHLORDIAZEPOXIDE HYDROCHLORIDE 25 MG: 25 CAPSULE ORAL at 23:17

## 2024-04-28 RX ADMIN — HEPARIN SODIUM 5000 UNITS: 5000 INJECTION INTRAVENOUS; SUBCUTANEOUS at 23:17

## 2024-04-28 RX ADMIN — CYCLOBENZAPRINE HYDROCHLORIDE 5 MG: 5 TABLET, FILM COATED ORAL at 15:21

## 2024-04-28 RX ADMIN — Medication 1 TABLET: at 09:54

## 2024-04-28 RX ADMIN — HEPARIN SODIUM 5000 UNITS: 5000 INJECTION INTRAVENOUS; SUBCUTANEOUS at 15:21

## 2024-04-28 RX ADMIN — FOLIC ACID 1 MG: 1 TABLET ORAL at 09:55

## 2024-04-28 RX ADMIN — HEPARIN SODIUM 5000 UNITS: 5000 INJECTION INTRAVENOUS; SUBCUTANEOUS at 09:54

## 2024-04-28 RX ADMIN — OXYCODONE HYDROCHLORIDE 10 MG: 10 TABLET ORAL at 18:21

## 2024-04-28 RX ADMIN — ZONISAMIDE 200 MG: 100 CAPSULE ORAL at 09:55

## 2024-04-28 RX ADMIN — CYCLOBENZAPRINE HYDROCHLORIDE 5 MG: 5 TABLET, FILM COATED ORAL at 21:04

## 2024-04-28 RX ADMIN — POTASSIUM CHLORIDE 40 MEQ: 1500 TABLET, EXTENDED RELEASE ORAL at 09:55

## 2024-04-28 RX ADMIN — ACETAMINOPHEN 650 MG: 325 TABLET ORAL at 21:05

## 2024-04-28 RX ADMIN — CHLORDIAZEPOXIDE HYDROCHLORIDE 25 MG: 25 CAPSULE ORAL at 09:55

## 2024-04-28 RX ADMIN — CHLORDIAZEPOXIDE HYDROCHLORIDE 25 MG: 25 CAPSULE ORAL at 15:21

## 2024-04-28 RX ADMIN — CYCLOBENZAPRINE HYDROCHLORIDE 5 MG: 5 TABLET, FILM COATED ORAL at 09:54

## 2024-04-28 RX ADMIN — ACETAMINOPHEN 650 MG: 325 TABLET ORAL at 09:55

## 2024-04-28 RX ADMIN — SODIUM CHLORIDE, POTASSIUM CHLORIDE, SODIUM LACTATE AND CALCIUM CHLORIDE 75 ML/HR: 600; 310; 30; 20 INJECTION, SOLUTION INTRAVENOUS at 05:05

## 2024-04-28 RX ADMIN — Medication 5 MG: at 21:05

## 2024-04-28 ASSESSMENT — PAIN - FUNCTIONAL ASSESSMENT
PAIN_FUNCTIONAL_ASSESSMENT: 0-10

## 2024-04-28 ASSESSMENT — COGNITIVE AND FUNCTIONAL STATUS - GENERAL
WALKING IN HOSPITAL ROOM: TOTAL
TOILETING: A LOT
TURNING FROM BACK TO SIDE WHILE IN FLAT BAD: A LOT
STANDING UP FROM CHAIR USING ARMS: A LOT
HELP NEEDED FOR BATHING: A LOT
MOVING TO AND FROM BED TO CHAIR: A LOT
DRESSING REGULAR UPPER BODY CLOTHING: A LOT
PERSONAL GROOMING: A LOT
DRESSING REGULAR LOWER BODY CLOTHING: A LOT
MOVING FROM LYING ON BACK TO SITTING ON SIDE OF FLAT BED WITH BEDRAILS: A LOT
EATING MEALS: A LOT
CLIMB 3 TO 5 STEPS WITH RAILING: TOTAL
DAILY ACTIVITIY SCORE: 12
MOBILITY SCORE: 10

## 2024-04-28 ASSESSMENT — PAIN SCALES - GENERAL
PAINLEVEL_OUTOF10: 10 - WORST POSSIBLE PAIN
PAINLEVEL_OUTOF10: 5 - MODERATE PAIN
PAINLEVEL_OUTOF10: 0 - NO PAIN
PAINLEVEL_OUTOF10: 10 - WORST POSSIBLE PAIN
PAINLEVEL_OUTOF10: 10 - WORST POSSIBLE PAIN
PAINLEVEL_OUTOF10: 5 - MODERATE PAIN
PAINLEVEL_OUTOF10: 10 - WORST POSSIBLE PAIN

## 2024-04-28 ASSESSMENT — PAIN DESCRIPTION - LOCATION
LOCATION: BACK

## 2024-04-28 NOTE — PROGRESS NOTES
Internal Medicine Progress Note    Patient Name: Katya Sandhu          MRN: [id number]  Today's Date: April 28, 2024          Attending: Toan Arriaga MD    Subjective:  Patient was seen and examined at bedside, pateint is drowsy.    Review Of Systems:  Complains of neck pain    Objective:  Vitals:    04/27/24 1600 04/27/24 2000 04/28/24 0000 04/28/24 0400   BP: 100/65 100/66 100/56 101/69   BP Location:  Left arm Left arm Left arm   Patient Position:  Lying Lying Lying   Pulse: 88 93 88 90   Resp: 15 18 16 18   Temp: 36.4 °C (97.5 °F) 36.6 °C (97.9 °F) 36.7 °C (98.1 °F) 36.4 °C (97.5 °F)   TempSrc: Temporal Temporal Temporal Temporal   SpO2: 98% 92% 94% 95%   Weight:       Height:           Physical Exam:   General appearance: Alert, in no acute distress  Neck: Surgical wound in the anterior aspect of neck   Lungs: Clear to auscultation, no wheezing or rhonchi  Heart: RRR without murmur, gallop, or rubs.   Abdomen: Soft, non-tender.  Neuro: Awake, cooperative    Labs:  Results for orders placed or performed during the hospital encounter of 04/23/24 (from the past 24 hour(s))   POCT GLUCOSE   Result Value Ref Range    POCT Glucose 100 (H) 74 - 99 mg/dL   CBC   Result Value Ref Range    WBC 12.2 (H) 4.4 - 11.3 x10*3/uL    nRBC 0.0 0.0 - 0.0 /100 WBCs    RBC 3.69 (L) 4.00 - 5.20 x10*6/uL    Hemoglobin 12.3 12.0 - 16.0 g/dL    Hematocrit 35.9 (L) 36.0 - 46.0 %    MCV 97 80 - 100 fL    MCH 33.3 26.0 - 34.0 pg    MCHC 34.3 32.0 - 36.0 g/dL    RDW 12.9 11.5 - 14.5 %    Platelets 266 150 - 450 x10*3/uL   Basic Metabolic Panel   Result Value Ref Range    Glucose 111 (H) 74 - 99 mg/dL    Sodium 137 136 - 145 mmol/L    Potassium 3.6 3.5 - 5.3 mmol/L    Chloride 105 98 - 107 mmol/L    Bicarbonate 21 21 - 32 mmol/L    Anion Gap 15 10 - 20 mmol/L    Urea Nitrogen 19 6 - 23 mg/dL    Creatinine 0.50 0.50 - 1.05 mg/dL    eGFR >90 >60 mL/min/1.73m*2    Calcium 8.6 8.6 - 10.3 mg/dL       Medications:  Scheduled  "medications  chlordiazePOXIDE, 25 mg, oral, q8h   Followed by  [START ON 4/29/2024] chlordiazePOXIDE, 25 mg, oral, q12h  cyclobenzaprine, 5 mg, oral, TID  folic acid, 1 mg, oral, Daily  heparin (porcine), 5,000 Units, subcutaneous, q8h  melatonin, 5 mg, oral, Nightly  multivitamin with minerals, 1 tablet, oral, Daily  polyethylene glycol, 17 g, oral, Daily  potassium chloride CR, 40 mEq, oral, Once  zonisamide, 200 mg, oral, Daily      Continuous medications  lactated Ringer's, 75 mL/hr, Last Rate: 75 mL/hr (04/28/24 0505)      PRN medications  PRN medications: acetaminophen, dextrose, dextrose, glucagon, glucagon, haloperidol lactate, HYDROmorphone, LORazepam, naloxone, ondansetron ODT **OR** ondansetron, oxyCODONE, oxyCODONE      Assessment/Plan:    Rhabdomyolysis  Resolved  Continue monitoring       ETOH abuse  Monitor for withdrawal symptoms  Continue current medications and measures  Psychiatry is following       Fall  PT/OT       Elevated lactic acid level  IV fluids       Neck pain    Closed nondisplaced fracture of sixth cervical vertebra (Multi)  Patient underwent Anterior cervical C6-7 discectomy and fusion with plating  Reduction of fracture  Neurosurgery is following  Pain control    Discussed with patient, RN    Toan Arriaga MD   Date: 04/28/24  Time: 8:11 AM    This note was partially created using voice recognition software and is inherently subject to errors including those of syntax and \"sound-alike\" substitutions which may escape proofreading. In such instances, original meaning may be extrapolated by contextual derivation    "

## 2024-04-28 NOTE — PROGRESS NOTES
"Katya Sandhu is a 74 y.o. female on day 5 of admission presenting with Rhabdomyolysis.    SUBJECTIVE:    Patient is awake alert and oriented x 2  Better than yesterday with less confusion  Patient is not perseverating  Significant cognitive decline  Withdrawal symptoms are better and she is tolerating the Librium taper    Exam:  Vital Signs: /79   Pulse 82   Temp 36.7 °C (98.1 °F)   Resp 16   Ht 1.651 m (5' 5\")   Wt 54.9 kg (121 lb 0.5 oz)   LMP  (LMP Unknown)   SpO2 97%   BMI 20.14 kg/m²   Musculoskeletal: normal  Gait/Station: in bed      Mental Status Exam:  General Appearance:   Speech: incoherent  Mood: anxious  Affect: anxious  Thought Process: Linear, goal directed, [place]  Thought Associations: Mild loosening of associations  Thought Content:  unable to assess  Perception:  VH  Level of Consciousness: Alert  Orientation:  disoriented  Attention and Concentration: Mild impairment in attention  Cognition:  Insight: poor  Judgment: poor     Results for orders placed or performed during the hospital encounter of 04/23/24 (from the past 96 hour(s))   Magnesium   Result Value Ref Range    Magnesium 2.14 1.60 - 2.40 mg/dL   Basic Metabolic Panel   Result Value Ref Range    Glucose 126 (H) 74 - 99 mg/dL    Sodium 137 136 - 145 mmol/L    Potassium 3.9 3.5 - 5.3 mmol/L    Chloride 107 98 - 107 mmol/L    Bicarbonate 22 21 - 32 mmol/L    Anion Gap 12 10 - 20 mmol/L    Urea Nitrogen 19 6 - 23 mg/dL    Creatinine 0.65 0.50 - 1.05 mg/dL    eGFR >90 >60 mL/min/1.73m*2    Calcium 8.3 (L) 8.6 - 10.3 mg/dL   Creatine Kinase   Result Value Ref Range    Creatine Kinase 339 (H) 0 - 215 U/L   Lavender Top   Result Value Ref Range    Extra Tube Hold for add-ons.    CBC   Result Value Ref Range    WBC 14.0 (H) 4.4 - 11.3 x10*3/uL    nRBC 0.0 0.0 - 0.0 /100 WBCs    RBC 3.48 (L) 4.00 - 5.20 x10*6/uL    Hemoglobin 11.5 (L) 12.0 - 16.0 g/dL    Hematocrit 34.1 (L) 36.0 - 46.0 %    MCV 98 80 - 100 fL    MCH 33.0 " 26.0 - 34.0 pg    MCHC 33.7 32.0 - 36.0 g/dL    RDW 13.4 11.5 - 14.5 %    Platelets 273 150 - 450 x10*3/uL   CBC   Result Value Ref Range    WBC 11.8 (H) 4.4 - 11.3 x10*3/uL    nRBC 0.0 0.0 - 0.0 /100 WBCs    RBC 4.34 4.00 - 5.20 x10*6/uL    Hemoglobin 14.2 12.0 - 16.0 g/dL    Hematocrit 43.4 36.0 - 46.0 %     80 - 100 fL    MCH 32.7 26.0 - 34.0 pg    MCHC 32.7 32.0 - 36.0 g/dL    RDW 13.2 11.5 - 14.5 %    Platelets 147 (L) 150 - 450 x10*3/uL   Basic Metabolic Panel   Result Value Ref Range    Glucose 96 74 - 99 mg/dL    Sodium 135 (L) 136 - 145 mmol/L    Potassium 3.9 3.5 - 5.3 mmol/L    Chloride 104 98 - 107 mmol/L    Bicarbonate 19 (L) 21 - 32 mmol/L    Anion Gap 16 10 - 20 mmol/L    Urea Nitrogen 18 6 - 23 mg/dL    Creatinine 0.51 0.50 - 1.05 mg/dL    eGFR >90 >60 mL/min/1.73m*2    Calcium 8.7 8.6 - 10.3 mg/dL   CBC   Result Value Ref Range    WBC 9.3 4.4 - 11.3 x10*3/uL    nRBC 0.0 0.0 - 0.0 /100 WBCs    RBC 3.90 (L) 4.00 - 5.20 x10*6/uL    Hemoglobin 13.0 12.0 - 16.0 g/dL    Hematocrit 38.1 36.0 - 46.0 %    MCV 98 80 - 100 fL    MCH 33.3 26.0 - 34.0 pg    MCHC 34.1 32.0 - 36.0 g/dL    RDW 13.1 11.5 - 14.5 %    Platelets 252 150 - 450 x10*3/uL   Basic Metabolic Panel   Result Value Ref Range    Glucose 105 (H) 74 - 99 mg/dL    Sodium 134 (L) 136 - 145 mmol/L    Potassium 3.5 3.5 - 5.3 mmol/L    Chloride 104 98 - 107 mmol/L    Bicarbonate 21 21 - 32 mmol/L    Anion Gap 13 10 - 20 mmol/L    Urea Nitrogen 21 6 - 23 mg/dL    Creatinine 0.50 0.50 - 1.05 mg/dL    eGFR >90 >60 mL/min/1.73m*2    Calcium 8.7 8.6 - 10.3 mg/dL   POCT GLUCOSE   Result Value Ref Range    POCT Glucose 100 (H) 74 - 99 mg/dL   CBC   Result Value Ref Range    WBC 12.2 (H) 4.4 - 11.3 x10*3/uL    nRBC 0.0 0.0 - 0.0 /100 WBCs    RBC 3.69 (L) 4.00 - 5.20 x10*6/uL    Hemoglobin 12.3 12.0 - 16.0 g/dL    Hematocrit 35.9 (L) 36.0 - 46.0 %    MCV 97 80 - 100 fL    MCH 33.3 26.0 - 34.0 pg    MCHC 34.3 32.0 - 36.0 g/dL    RDW 12.9 11.5 - 14.5 %     Platelets 266 150 - 450 x10*3/uL   Basic Metabolic Panel   Result Value Ref Range    Glucose 111 (H) 74 - 99 mg/dL    Sodium 137 136 - 145 mmol/L    Potassium 3.6 3.5 - 5.3 mmol/L    Chloride 105 98 - 107 mmol/L    Bicarbonate 21 21 - 32 mmol/L    Anion Gap 15 10 - 20 mmol/L    Urea Nitrogen 19 6 - 23 mg/dL    Creatinine 0.50 0.50 - 1.05 mg/dL    eGFR >90 >60 mL/min/1.73m*2    Calcium 8.6 8.6 - 10.3 mg/dL         Impression:   Alcohol use disorder  Delirium-resolving  Dementia    Recommendations:    1.  Librium taper schedule will be completed on Tuesday morning  2.  Discussed with the family about the need for long-term care for the patient  3.  Patient does not meet criteria for geropsych admission    Can be discharged when medically stable    Thank you for allowing us to participate in the care of this patient.       Yury Mohamud MD  4/28/2024  11:43 AM2

## 2024-04-28 NOTE — PROGRESS NOTES
"Katya Sandhu is a 74 y.o. female on day 5 of admission presenting with Rhabdomyolysis.      Subjective   No overnight events  Confusion persists  Some word finding difficulty noted today       Objective     Last Recorded Vitals  Blood pressure 125/79, pulse 82, temperature 36.7 °C (98.1 °F), resp. rate 16, height 1.651 m (5' 5\"), weight 54.9 kg (121 lb 0.5 oz), SpO2 97%.    Gen: NAD  Neuro:  --HIF: A&O X 1, naming 0/2, repetition intact; follows commands  --CN:  PERRLA, EOMI, VFF, no visible facial asymmetry, facial sensation intact, no tongue or palatal deviation, SCM intact  --Motor: Moves all 4 extremities equally; no focal deficits  --Sensory: Intact to light touch, intact to pinprick  --Reflex: 2+ symmetric, toes down  --Cerebellum: FTN and HTS intact  --Gait: Deferred     Relevant Results                    Melissa Coma Scale  Best Eye Response: Spontaneous  Best Verbal Response: Confused  Best Motor Response: Follows commands  Melissa Coma Scale Score: 14                             Assessment/Plan      Principal Problem:    Rhabdomyolysis  Active Problems:    ETOH abuse    Fall    Closed head injury    Elevated lactic acid level    Neck pain    Alcohol withdrawal seizure, with unspecified complication (Multi)    Closed nondisplaced fracture of sixth cervical vertebra (Multi)     Encephalopathy - persistent today; word finding difficulty noted  - likely etoh withdrawal  - recommend MRI Brain given continued altered mental status           Dylon Quinones MD  "

## 2024-04-28 NOTE — NURSING NOTE
"2000 - start of shift note:  Handoff report given by Kaley WASHBURN.  Pt. Observed lying in bed.  Arousable to verbal stimuli.  Response is delayed and pt. Appears somnolent.  AxOx2 with disorientation to time and situation.  Pt. Cooperative with care.   IVF running at 50ml/hr.  Order states IVF to run at 75ml/hr.  Rate adjusted.  Bed alarm on/audible.  Call light within reach.     2119 - New IV placed to L FA.  22g IV inserted without complaint and flushed well.  Continuous IVF switched to new IV.  R Wrist with small amount of leaking.  Dressing redressed and secured.  Will continue to monitor.    2135 - Pain c/o pain after repositioned.  C/o pain at neck and R knee.  Soft moaning heard from pt.   Pt. States her pain is at a 4/10.   PRN tyelonol given per order.    0500 - Pt. Is awake and hollering out.  Pt. Asking for  and is confused.   Expresses pain at back and neck at this time and rates a \"13/10\".  PRN Oxy 5mg given per order.  IVF running continuous per order.  New bag hung.   Pt. Repositioned with assistance.      0630 - Second IV removed inadvertently by pt.  IV at L FA still infusing and is working well.    De La Rosa draining clear/yellow urine.   Pt. Is calm and expresses comfort. Bed alarm on /audible.  Call light within reach.   "

## 2024-04-28 NOTE — DOCUMENTATION CLARIFICATION NOTE
"    PATIENT:               KYLEIGH YOO  ACCT #:                  [id number]  MRN:                       [id number]  :                       1950  ADMIT DATE:       2024 8:18 AM  DISCH DATE:  RESPONDING PROVIDER #:        [id number]          PROVIDER RESPONSE TEXT:    Toxic Encephalopathy 2/2 alcohol withdrawal    CDI QUERY TEXT:    Clarification    Instruction:    Based on your assessment of the patient and the clinical information, please provide the requested documentation by clicking on the appropriate radio button and enter any additional information if prompted.    Question: Please further clarify the type of Encephalopathy as    When answering this query, please exercise your independent professional judgment. The fact that a question is being asked, does not imply that any particular answer is desired or expected.    The patient's clinical indicators include:  Clinical Information: 75 y/o F admitted s/p fall with cervical fracture s/p discectomy and fusion    Clinical Indicators:    ED note  Dr. Jolly: \"unwitnessed fall, she was found at the bottom of the stairs,  and EMS reporting that she likely fell last night and did have some alcoholic drinks.  Per EMS report, she was down for prolonged period of time.  labs consistent with dehydration and rhabdomyolysis\"    H/P  Enedina: \"Rhabdomyolysis, EtoH abuse, closed head injury.  Pt is  confused now alert and oriented to person. Spoke to the  spouse verified her baseline mental status is memory loss and confused h/o dementia\"    H/P  Dr. Arriaga: \"ETOH abuse\"    OP note  DR. Min: \"Closed nondisplaced fracture of sixth cervical vertebrae.  Procedure: Anterior cervical C6-7 discectomy and fusion with plating, Reduction of fracture\"    psychiatry consult  Dr. Mohamud: \"Pt is needing high dose of Ativan 4 mg so far since this morning. Agitation gets worse close to every 4 hrs period.  Use ativan only for any " "breakthrough withdrawal symptoms.  presenting with fall and fracture of spine, intoxicated. Pt is alert but disoriented.  Pt has been perseverating during the interview, repeating the same response and questions.  Pt admit to drinking alcohol but unable to tell the severity of use. Pt is currently going through withdrawal\"    neurology consult 4/25 DR. Quinones: \"Encephalopathy- likely secondary to alcohol withdrawal.  mgmt of alcohol withdrawal per psychiatry.  Shortly afterwards, she had CIWA activated for alcohol withdrawal protocol. Primary care team concerned today because pt has been repeating herself when asked questions or to follow commands. She had an unremarkable CT head on admission\"    MD PN 4/25 Dr. Dempsey: \"patient is awake but confused.  Altered mental status\"    Treatment: CT head, neurology consult, psychiatry consult, CIWA scale, PO folic acid daily, multivitamin daily, IV thiamine daily    Risk Factors: age, alcohol withdrawal, AMS  Options provided:  -- Toxic Encephalopathy 2/2 alcohol withdrawal  -- Other - I will add my own diagnosis  -- Refer to Clinical Documentation Reviewer    Query created by: Rosalia Benitez on 4/26/2024 10:31 AM      Electronically signed by:  JESIKA DEMPSEY MD 4/28/2024 7:21 AM          "

## 2024-04-28 NOTE — NURSING NOTE
Patient alert to self and has been confused throughout shift. Patient confused and trying to get out of bed. Patient pulling off tele. De La Rosa removed this morning at 0830. Bladder scan completed around 1400 and was 259. Patient then bladder scanned around 1730 and showed 500cc. [facility] notified and waiting for orders.     Patient with poor appetite and needing fed. Patient took meds crushed in applesauce. Bed alarm remains on, seizure precautions in place.     Patient urinated and new bladder scan completed for 129cc and voided 300cc

## 2024-04-29 ENCOUNTER — APPOINTMENT (OUTPATIENT)
Dept: RADIOLOGY | Facility: HOSPITAL | Age: 74
DRG: 028 | End: 2024-04-29
Payer: MEDICARE

## 2024-04-29 LAB
AMMONIA PLAS-SCNC: 26 UMOL/L (ref 16–53)
ANION GAP SERPL CALC-SCNC: 18 MMOL/L (ref 10–20)
BUN SERPL-MCNC: 13 MG/DL (ref 6–23)
CALCIUM SERPL-MCNC: 9.2 MG/DL (ref 8.6–10.3)
CHLORIDE SERPL-SCNC: 105 MMOL/L (ref 98–107)
CO2 SERPL-SCNC: 18 MMOL/L (ref 21–32)
CREAT SERPL-MCNC: 0.55 MG/DL (ref 0.5–1.05)
EGFRCR SERPLBLD CKD-EPI 2021: >90 ML/MIN/1.73M*2
ERYTHROCYTE [DISTWIDTH] IN BLOOD BY AUTOMATED COUNT: 13 % (ref 11.5–14.5)
GLUCOSE SERPL-MCNC: 75 MG/DL (ref 74–99)
HCT VFR BLD AUTO: 36.6 % (ref 36–46)
HGB BLD-MCNC: 12.2 G/DL (ref 12–16)
MCH RBC QN AUTO: 32.5 PG (ref 26–34)
MCHC RBC AUTO-ENTMCNC: 33.3 G/DL (ref 32–36)
MCV RBC AUTO: 98 FL (ref 80–100)
MRSA DNA SPEC QL NAA+PROBE: NOT DETECTED
NRBC BLD-RTO: 0 /100 WBCS (ref 0–0)
PLATELET # BLD AUTO: 270 X10*3/UL (ref 150–450)
POTASSIUM SERPL-SCNC: 4.2 MMOL/L (ref 3.5–5.3)
PROCALCITONIN SERPL-MCNC: 0.09 NG/ML
RBC # BLD AUTO: 3.75 X10*6/UL (ref 4–5.2)
SODIUM SERPL-SCNC: 137 MMOL/L (ref 136–145)
WBC # BLD AUTO: 14.8 X10*3/UL (ref 4.4–11.3)

## 2024-04-29 PROCEDURE — 82140 ASSAY OF AMMONIA: CPT | Performed by: NURSE PRACTITIONER

## 2024-04-29 PROCEDURE — 2500000004 HC RX 250 GENERAL PHARMACY W/ HCPCS (ALT 636 FOR OP/ED): Performed by: NURSE PRACTITIONER

## 2024-04-29 PROCEDURE — 99232 SBSQ HOSP IP/OBS MODERATE 35: CPT | Performed by: PSYCHIATRY & NEUROLOGY

## 2024-04-29 PROCEDURE — 97116 GAIT TRAINING THERAPY: CPT | Mod: GP,CQ

## 2024-04-29 PROCEDURE — 97530 THERAPEUTIC ACTIVITIES: CPT | Mod: GP,CQ

## 2024-04-29 PROCEDURE — 87641 MR-STAPH DNA AMP PROBE: CPT | Performed by: NURSE PRACTITIONER

## 2024-04-29 PROCEDURE — 2500000001 HC RX 250 WO HCPCS SELF ADMINISTERED DRUGS (ALT 637 FOR MEDICARE OP): Performed by: PSYCHIATRY & NEUROLOGY

## 2024-04-29 PROCEDURE — 1200000002 HC GENERAL ROOM WITH TELEMETRY DAILY

## 2024-04-29 PROCEDURE — 84145 PROCALCITONIN (PCT): CPT | Mod: STJLAB | Performed by: NURSE PRACTITIONER

## 2024-04-29 PROCEDURE — 82374 ASSAY BLOOD CARBON DIOXIDE: CPT | Performed by: NURSE PRACTITIONER

## 2024-04-29 PROCEDURE — 70551 MRI BRAIN STEM W/O DYE: CPT

## 2024-04-29 PROCEDURE — 92610 EVALUATE SWALLOWING FUNCTION: CPT | Mod: GN | Performed by: SPEECH-LANGUAGE PATHOLOGIST

## 2024-04-29 PROCEDURE — 36415 COLL VENOUS BLD VENIPUNCTURE: CPT | Performed by: NURSE PRACTITIONER

## 2024-04-29 PROCEDURE — 2500000001 HC RX 250 WO HCPCS SELF ADMINISTERED DRUGS (ALT 637 FOR MEDICARE OP): Performed by: STUDENT IN AN ORGANIZED HEALTH CARE EDUCATION/TRAINING PROGRAM

## 2024-04-29 PROCEDURE — 2500000006 HC RX 250 W HCPCS SELF ADMINISTERED DRUGS (ALT 637 FOR ALL PAYERS): Performed by: STUDENT IN AN ORGANIZED HEALTH CARE EDUCATION/TRAINING PROGRAM

## 2024-04-29 PROCEDURE — 71045 X-RAY EXAM CHEST 1 VIEW: CPT

## 2024-04-29 PROCEDURE — 70551 MRI BRAIN STEM W/O DYE: CPT | Performed by: RADIOLOGY

## 2024-04-29 PROCEDURE — 97535 SELF CARE MNGMENT TRAINING: CPT | Mod: GO,CO

## 2024-04-29 PROCEDURE — 99232 SBSQ HOSP IP/OBS MODERATE 35: CPT | Performed by: SPECIALIST

## 2024-04-29 PROCEDURE — 71045 X-RAY EXAM CHEST 1 VIEW: CPT | Performed by: RADIOLOGY

## 2024-04-29 PROCEDURE — 2500000004 HC RX 250 GENERAL PHARMACY W/ HCPCS (ALT 636 FOR OP/ED): Performed by: STUDENT IN AN ORGANIZED HEALTH CARE EDUCATION/TRAINING PROGRAM

## 2024-04-29 PROCEDURE — 85027 COMPLETE CBC AUTOMATED: CPT | Performed by: NURSE PRACTITIONER

## 2024-04-29 RX ORDER — ACETAMINOPHEN 500 MG
5 TABLET ORAL NIGHTLY PRN
Status: DISCONTINUED | OUTPATIENT
Start: 2024-04-29 | End: 2024-05-01 | Stop reason: HOSPADM

## 2024-04-29 RX ADMIN — POLYETHYLENE GLYCOL 3350 17 G: 17 POWDER, FOR SOLUTION ORAL at 08:47

## 2024-04-29 RX ADMIN — HEPARIN SODIUM 5000 UNITS: 5000 INJECTION INTRAVENOUS; SUBCUTANEOUS at 23:55

## 2024-04-29 RX ADMIN — HEPARIN SODIUM 5000 UNITS: 5000 INJECTION INTRAVENOUS; SUBCUTANEOUS at 14:48

## 2024-04-29 RX ADMIN — CHLORDIAZEPOXIDE HYDROCHLORIDE 25 MG: 25 CAPSULE ORAL at 08:48

## 2024-04-29 RX ADMIN — LORAZEPAM 0.5 MG: 0.5 TABLET ORAL at 04:38

## 2024-04-29 RX ADMIN — ZONISAMIDE 200 MG: 100 CAPSULE ORAL at 08:48

## 2024-04-29 RX ADMIN — SODIUM CHLORIDE, POTASSIUM CHLORIDE, SODIUM LACTATE AND CALCIUM CHLORIDE 75 ML/HR: 600; 310; 30; 20 INJECTION, SOLUTION INTRAVENOUS at 20:45

## 2024-04-29 RX ADMIN — SODIUM CHLORIDE, POTASSIUM CHLORIDE, SODIUM LACTATE AND CALCIUM CHLORIDE 75 ML/HR: 600; 310; 30; 20 INJECTION, SOLUTION INTRAVENOUS at 04:29

## 2024-04-29 RX ADMIN — CYCLOBENZAPRINE HYDROCHLORIDE 5 MG: 5 TABLET, FILM COATED ORAL at 08:48

## 2024-04-29 RX ADMIN — CYCLOBENZAPRINE HYDROCHLORIDE 5 MG: 5 TABLET, FILM COATED ORAL at 20:37

## 2024-04-29 RX ADMIN — FOLIC ACID 1 MG: 1 TABLET ORAL at 08:48

## 2024-04-29 RX ADMIN — CYCLOBENZAPRINE HYDROCHLORIDE 5 MG: 5 TABLET, FILM COATED ORAL at 14:48

## 2024-04-29 RX ADMIN — Medication 1 TABLET: at 08:48

## 2024-04-29 RX ADMIN — ACETAMINOPHEN 650 MG: 325 TABLET ORAL at 04:37

## 2024-04-29 RX ADMIN — HEPARIN SODIUM 5000 UNITS: 5000 INJECTION INTRAVENOUS; SUBCUTANEOUS at 08:47

## 2024-04-29 ASSESSMENT — COGNITIVE AND FUNCTIONAL STATUS - GENERAL
TURNING FROM BACK TO SIDE WHILE IN FLAT BAD: A LOT
TOILETING: A LOT
PERSONAL GROOMING: A LOT
DRESSING REGULAR LOWER BODY CLOTHING: A LOT
EATING MEALS: A LITTLE
EATING MEALS: A LOT
HELP NEEDED FOR BATHING: A LOT
MOVING FROM LYING ON BACK TO SITTING ON SIDE OF FLAT BED WITH BEDRAILS: A LOT
CLIMB 3 TO 5 STEPS WITH RAILING: TOTAL
MOBILITY SCORE: 10
WALKING IN HOSPITAL ROOM: TOTAL
MOVING FROM LYING ON BACK TO SITTING ON SIDE OF FLAT BED WITH BEDRAILS: A LOT
DAILY ACTIVITIY SCORE: 12
MOVING TO AND FROM BED TO CHAIR: A LOT
TURNING FROM BACK TO SIDE WHILE IN FLAT BAD: A LOT
STANDING UP FROM CHAIR USING ARMS: A LOT
DRESSING REGULAR UPPER BODY CLOTHING: A LOT
DRESSING REGULAR LOWER BODY CLOTHING: A LOT
WALKING IN HOSPITAL ROOM: TOTAL
MOBILITY SCORE: 10
CLIMB 3 TO 5 STEPS WITH RAILING: TOTAL
MOVING TO AND FROM BED TO CHAIR: A LOT
STANDING UP FROM CHAIR USING ARMS: A LOT
HELP NEEDED FOR BATHING: A LOT
DAILY ACTIVITIY SCORE: 12
TOILETING: TOTAL
PERSONAL GROOMING: A LOT
DRESSING REGULAR UPPER BODY CLOTHING: A LOT

## 2024-04-29 ASSESSMENT — PAIN SCALES - GENERAL
PAINLEVEL_OUTOF10: 0 - NO PAIN
PAINLEVEL_OUTOF10: 3
PAINLEVEL_OUTOF10: 4
PAINLEVEL_OUTOF10: 4

## 2024-04-29 ASSESSMENT — LIFESTYLE VARIABLES
PAROXYSMAL SWEATS: NO SWEAT VISIBLE
AGITATION: NORMAL ACTIVITY
NAUSEA AND VOMITING: NO NAUSEA AND NO VOMITING
HEADACHE, FULLNESS IN HEAD: NOT PRESENT
ANXIETY: NO ANXIETY, AT EASE
AUDITORY DISTURBANCES: NOT PRESENT
ANXIETY: NO ANXIETY, AT EASE
PAROXYSMAL SWEATS: NO SWEAT VISIBLE
TOTAL SCORE: 0
TREMOR: NO TREMOR
TOTAL SCORE: 0
VISUAL DISTURBANCES: NOT PRESENT
ORIENTATION AND CLOUDING OF SENSORIUM: ORIENTED AND CAN DO SERIAL ADDITIONS
ORIENTATION AND CLOUDING OF SENSORIUM: ORIENTED AND CAN DO SERIAL ADDITIONS
ANXIETY: NO ANXIETY, AT EASE
NAUSEA AND VOMITING: NO NAUSEA AND NO VOMITING
AGITATION: NORMAL ACTIVITY
AUDITORY DISTURBANCES: NOT PRESENT
ORIENTATION AND CLOUDING OF SENSORIUM: ORIENTED AND CAN DO SERIAL ADDITIONS
VISUAL DISTURBANCES: NOT PRESENT
VISUAL DISTURBANCES: NOT PRESENT
PAROXYSMAL SWEATS: NO SWEAT VISIBLE
AGITATION: NORMAL ACTIVITY
TREMOR: NO TREMOR
TREMOR: NO TREMOR
AUDITORY DISTURBANCES: NOT PRESENT
HEADACHE, FULLNESS IN HEAD: NOT PRESENT
AGITATION: NORMAL ACTIVITY
PAROXYSMAL SWEATS: NO SWEAT VISIBLE
VISUAL DISTURBANCES: NOT PRESENT
TOTAL SCORE: 0
HEADACHE, FULLNESS IN HEAD: NOT PRESENT
NAUSEA AND VOMITING: NO NAUSEA AND NO VOMITING
ANXIETY: NO ANXIETY, AT EASE
HEADACHE, FULLNESS IN HEAD: NOT PRESENT
NAUSEA AND VOMITING: NO NAUSEA AND NO VOMITING
AUDITORY DISTURBANCES: NOT PRESENT
TREMOR: NO TREMOR
ORIENTATION AND CLOUDING OF SENSORIUM: ORIENTED AND CAN DO SERIAL ADDITIONS
TOTAL SCORE: 0

## 2024-04-29 ASSESSMENT — PAIN - FUNCTIONAL ASSESSMENT
PAIN_FUNCTIONAL_ASSESSMENT: 0-10

## 2024-04-29 ASSESSMENT — ACTIVITIES OF DAILY LIVING (ADL): HOME_MANAGEMENT_TIME_ENTRY: 42

## 2024-04-29 ASSESSMENT — PAIN DESCRIPTION - DESCRIPTORS: DESCRIPTORS: ACHING

## 2024-04-29 ASSESSMENT — PAIN DESCRIPTION - LOCATION: LOCATION: BACK

## 2024-04-29 NOTE — NURSING NOTE
Pt calm and cooperative through shift. Pt did not complain of pain and repositioned every two hours. Pt went for MRI and ate her meals. IV fluid finished.

## 2024-04-29 NOTE — PROGRESS NOTES
"Katya Sandhu is a 74 y.o. female on day 6 of admission presenting with Rhabdomyolysis.    SUBJECTIVE:    Patient is awake alert and oriented x 1  Fluctuating level of consciousness  Patient completed the detox with Librium and her vitals are within normal limits.  Patient has been receiving oxycodone from as needed medication which could contribute to the confusion  Exam:  Vital Signs: /64 (BP Location: Right arm, Patient Position: Lying)   Pulse 84   Temp 36.5 °C (97.7 °F)   Resp 18   Ht 1.651 m (5' 5\")   Wt 54.9 kg (121 lb 0.5 oz)   LMP  (LMP Unknown)   SpO2 98%   BMI 20.14 kg/m²   Musculoskeletal: normal  Gait/Station: in bed      Mental Status Exam:  General Appearance:   Speech: incoherent  Mood: anxious  Affect: anxious  Thought Process: [place]  Thought Associations: Mild loosening of associations  Thought Content:  unable to assess  Perception:  VH  Level of Consciousness: Alert  Orientation:  disoriented  Attention and Concentration: Mild impairment in attention  Cognition:  Insight: poor  Judgment: poor     Results for orders placed or performed during the hospital encounter of 04/23/24 (from the past 96 hour(s))   CBC   Result Value Ref Range    WBC 11.8 (H) 4.4 - 11.3 x10*3/uL    nRBC 0.0 0.0 - 0.0 /100 WBCs    RBC 4.34 4.00 - 5.20 x10*6/uL    Hemoglobin 14.2 12.0 - 16.0 g/dL    Hematocrit 43.4 36.0 - 46.0 %     80 - 100 fL    MCH 32.7 26.0 - 34.0 pg    MCHC 32.7 32.0 - 36.0 g/dL    RDW 13.2 11.5 - 14.5 %    Platelets 147 (L) 150 - 450 x10*3/uL   Basic Metabolic Panel   Result Value Ref Range    Glucose 96 74 - 99 mg/dL    Sodium 135 (L) 136 - 145 mmol/L    Potassium 3.9 3.5 - 5.3 mmol/L    Chloride 104 98 - 107 mmol/L    Bicarbonate 19 (L) 21 - 32 mmol/L    Anion Gap 16 10 - 20 mmol/L    Urea Nitrogen 18 6 - 23 mg/dL    Creatinine 0.51 0.50 - 1.05 mg/dL    eGFR >90 >60 mL/min/1.73m*2    Calcium 8.7 8.6 - 10.3 mg/dL   CBC   Result Value Ref Range    WBC 9.3 4.4 - 11.3 " x10*3/uL    nRBC 0.0 0.0 - 0.0 /100 WBCs    RBC 3.90 (L) 4.00 - 5.20 x10*6/uL    Hemoglobin 13.0 12.0 - 16.0 g/dL    Hematocrit 38.1 36.0 - 46.0 %    MCV 98 80 - 100 fL    MCH 33.3 26.0 - 34.0 pg    MCHC 34.1 32.0 - 36.0 g/dL    RDW 13.1 11.5 - 14.5 %    Platelets 252 150 - 450 x10*3/uL   Basic Metabolic Panel   Result Value Ref Range    Glucose 105 (H) 74 - 99 mg/dL    Sodium 134 (L) 136 - 145 mmol/L    Potassium 3.5 3.5 - 5.3 mmol/L    Chloride 104 98 - 107 mmol/L    Bicarbonate 21 21 - 32 mmol/L    Anion Gap 13 10 - 20 mmol/L    Urea Nitrogen 21 6 - 23 mg/dL    Creatinine 0.50 0.50 - 1.05 mg/dL    eGFR >90 >60 mL/min/1.73m*2    Calcium 8.7 8.6 - 10.3 mg/dL   POCT GLUCOSE   Result Value Ref Range    POCT Glucose 100 (H) 74 - 99 mg/dL   CBC   Result Value Ref Range    WBC 12.2 (H) 4.4 - 11.3 x10*3/uL    nRBC 0.0 0.0 - 0.0 /100 WBCs    RBC 3.69 (L) 4.00 - 5.20 x10*6/uL    Hemoglobin 12.3 12.0 - 16.0 g/dL    Hematocrit 35.9 (L) 36.0 - 46.0 %    MCV 97 80 - 100 fL    MCH 33.3 26.0 - 34.0 pg    MCHC 34.3 32.0 - 36.0 g/dL    RDW 12.9 11.5 - 14.5 %    Platelets 266 150 - 450 x10*3/uL   Basic Metabolic Panel   Result Value Ref Range    Glucose 111 (H) 74 - 99 mg/dL    Sodium 137 136 - 145 mmol/L    Potassium 3.6 3.5 - 5.3 mmol/L    Chloride 105 98 - 107 mmol/L    Bicarbonate 21 21 - 32 mmol/L    Anion Gap 15 10 - 20 mmol/L    Urea Nitrogen 19 6 - 23 mg/dL    Creatinine 0.50 0.50 - 1.05 mg/dL    eGFR >90 >60 mL/min/1.73m*2    Calcium 8.6 8.6 - 10.3 mg/dL   Basic Metabolic Panel   Result Value Ref Range    Glucose 75 74 - 99 mg/dL    Sodium 137 136 - 145 mmol/L    Potassium 4.2 3.5 - 5.3 mmol/L    Chloride 105 98 - 107 mmol/L    Bicarbonate 18 (L) 21 - 32 mmol/L    Anion Gap 18 10 - 20 mmol/L    Urea Nitrogen 13 6 - 23 mg/dL    Creatinine 0.55 0.50 - 1.05 mg/dL    eGFR >90 >60 mL/min/1.73m*2    Calcium 9.2 8.6 - 10.3 mg/dL   CBC   Result Value Ref Range    WBC 14.8 (H) 4.4 - 11.3 x10*3/uL    nRBC 0.0 0.0 - 0.0 /100 WBCs     RBC 3.75 (L) 4.00 - 5.20 x10*6/uL    Hemoglobin 12.2 12.0 - 16.0 g/dL    Hematocrit 36.6 36.0 - 46.0 %    MCV 98 80 - 100 fL    MCH 32.5 26.0 - 34.0 pg    MCHC 33.3 32.0 - 36.0 g/dL    RDW 13.0 11.5 - 14.5 %    Platelets 270 150 - 450 x10*3/uL   Ammonia   Result Value Ref Range    Ammonia 26 16 - 53 umol/L   MRSA Surveillance for Vancomycin De-escalation, PCR    Specimen: Anterior Nares; Swab   Result Value Ref Range    MRSA PCR Not Detected Not Detected         Impression:   Alcohol use disorder  Delirium-resolving  Dementia    Recommendations:    1.  DC librium and ativan. Pt completed detox and she is not in any active withdrawal    2. Minimize the other medication like opiates and flexeril which could exacerbate the confusion    3. Change Melatonin to PRN for sleep    4. Use Haldol for any agitation or sundowning as needed      Can be discharged when medically stable    Thank you for allowing us to participate in the care of this patient.       Yury Mohamud MD  4/29/2024  3:57 PM2

## 2024-04-29 NOTE — PROGRESS NOTES
Occupational Therapy    OT Treatment    Patient Name: Katya Sandhu  MRN: [id number]  Today's Date: 4/29/2024  Time Calculation  Start Time: 1202  Stop Time: 1244  Time Calculation (min): 42 min         Assessment:  End of Session Communication: Bedside nurse  End of Session Patient Position: Bed, 3 rail up, Alarm on       Plan:  OT Frequency: Daily  OT Discharge Recommendations: Moderate intensity level of continued care     Subjective        04/29/24 1202   OT Last Visit   OT Received On 04/29/24   General   Prior to Session Communication Bedside nurse   Patient Position Received Bed, 3 rail up;Alarm on   Preferred Learning Style verbal;visual   General Comment Pt attempting to get off chair without assist, ALMEIDA in to assist.   Precautions   Post-Surgical Precautions Spinal precautions   Cognition   Overall Cognitive Status Impaired   Orientation Level Disoriented to place;Disoriented to time;Disoriented to situation   Toileting   Toileting Level of Assistance Dependent   Where Assessed   (chair)   Toileting Comments Pt incontinent, Dep A for incontinence and keesha care.   Bed Mobility   Bed Mobility Yes   Bed Mobility 1   Bed Mobility 1 Sitting to supine   Level of Assistance 1 Maximum assistance   Transfers   Transfer Yes   Transfer 1   Transfer From 1 Chair with arms to   Transfer to 1 Stand   Technique 1 Sit to stand;To right   Transfer Device 1 Gait belt   Transfer Level of Assistance 1 Moderate assistance;Maximum assistance   Trials/Comments 1 HHA   Transfers 2   Transfer From 2 Chair with arms to   Transfer to 2 Bed   Technique 2 Stand pivot   Transfer Device 2 Gait belt   Transfer Level of Assistance 2 Moderate assistance   IP OT Assessment   End of Session Communication Bedside nurse   End of Session Patient Position Bed, 3 rail up;Alarm on   Inpatient Plan   OT Frequency Daily   OT Discharge Recommendations Moderate intensity level of continued care       Outcome Measures:[facility] Daily  Activity  Putting on and taking off regular lower body clothing: A lot  Bathing (including washing, rinsing, drying): A lot  Putting on and taking off regular upper body clothing: A lot  Toileting, which includes using toilet, bedpan or urinal: Total  Taking care of personal grooming such as brushing teeth: A lot  Eating Meals: A little  Daily Activity - Total Score: 12  Education Documentation  Body Mechanics, taught by ELPIDIO Bueno at 4/29/2024  3:42 PM.  Learner: Patient  Readiness: Acceptance  Method: Demonstration, Explanation  Response: Needs Reinforcement    Precautions, taught by ELPIDIO Bueno at 4/29/2024  3:42 PM.  Learner: Patient  Readiness: Acceptance  Method: Demonstration, Explanation  Response: Needs Reinforcement    ADL Training, taught by ELPIDIO Bueno at 4/29/2024  3:42 PM.  Learner: Patient  Readiness: Acceptance  Method: Demonstration, Explanation  Response: Needs Reinforcement    Education Comments  No comments found.            Goals:  Encounter Problems       Encounter Problems (Active)       Dressings Lower Extremities       STG - Patient to complete lower body dressing SUP (Progressing)       Start:  04/25/24    Expected End:  05/01/24               Grooming       STG - Patient completes grooming SUP (Progressing)       Start:  04/25/24    Expected End:  05/01/24            STG - Patient will tolerate standing 2-4min (Progressing)       Start:  04/25/24    Expected End:  05/01/24               OT Transfers       STG - Patient will perform toilet transfer min assist (Progressing)       Start:  04/25/24    Expected End:  05/01/24

## 2024-04-29 NOTE — NURSING NOTE
Pt's  came up to the desk to ask about how his wife did during the night.  Update given.    concerned with lethargy at this time.  Nurse assessed pt. and pt. Is arousable to verbal stimuli.  PRN Ativan given around 0445 for anxiety.   walked away from the room at this time.

## 2024-04-29 NOTE — PROGRESS NOTES
Speech-Language Pathology    SLP Adult Inpatient Speech-Language Pathology Clinical Swallow Evaluation    Patient Name: Katya Sandhu  MRN: [id number]  Today's Date: 4/29/2024   Time Calculation  Start Time: 1504  Stop Time: 1518  Time Calculation (min): 14 min         Current Problem:   1. Rhabdomyolysis        2. Closed head injury, initial encounter        3. Closed nondisplaced fracture of sixth cervical vertebra, unspecified fracture morphology, initial encounter (Multi)  Case Request Operating Room: C6-7 anterior cervical discectomy and fusion, reduction of fracture    Case Request Operating Room: C6-7 anterior cervical discectomy and fusion, reduction of fracture      4. Neck pain  acetaminophen (Tylenol) 325 mg tablet      5. Acute postoperative pain  acetaminophen (Tylenol) 325 mg tablet            Recommendations:  Risk for Aspiration: Yes  Additional Recommendations: Modified barium swallow study  Solid Diet Recommendations : Regular (IDDSI Level 7)  Liquid Diet Recommendations: Thin (IDDSI Level 0)  Compensatory Swallowing Strategies: Upright 90 degrees as possible for all oral intake, Small bites/sips, Remain upright for 20-30 minutes after meals, Eat/feed slowly  Medication Administration Recommendations: With Pureed, Crushed, Other (Comment) (or as best tolerated)  Follow up treatments: Other (Comment) (per MBSS)      Assessment:  Assessment Results:   Patient presents with concern for possible oropharyngeal dysphagia upon completion of clinical swallow evaluation at bedside this date. Examination of oral mechanism was limited due to patient difficulty following commands and imitating models (noted to perseverate on first instruction in subsequent tasks).  [place] Swallow Protocol (3oz water challenge) was not administered due to patient difficulty following commands. Patient demonstrated intermittent throat clear and cough x1 for thin liquids via straw sips. Patient appeared to tolerate puree  without difficulty or overt s/s of aspiration. Patient demonstrating prolonged oral manipulation of soft and regular solid boluses, but eventual oral clearance given extended time and cueing to promote alertness. Patient noted to swallow several times per bolus, suggestive of difficulty clearing bolus from pharynx.    Per the results of this assessment, and given concern for possible dysphagia s/p ACDF, recommend modified barium swallow study for further assessment. Patient may continue baseline diet with 1:1 supervision and assistance pending completion of MBSS. ST to complete MBSS at next availability, and will update recommendations as indicated per MBSS.    Prognosis: Good  Treatment Provided: No  Barriers: Cognition, Comorbidities      Plan:  Inpatient/Swing Bed or Outpatient: Inpatient  Treatment/Interventions: Complete MBSS, Diet recommendations, Patient/family education  SLP Plan: Skilled SLP  SLP Frequency: 1x per week  Duration: Current admission  SLP Discharge Recommendations: Other (Comment) (per MBSS)  Next Treatment Priority: MBSS  Discussed POC: Patient, Nursing, Other (Comment) (medical team)  Patient/Caregiver Agreeable: Yes    Goals (established 4/29/24):  1. Patient will tolerate baseline diet absent of overt s/s of aspiration in 90% of observed therapeutic trials.  2. Patient will implement safe swallowing strategies to reduce risk of aspiration in 90% of trials given caregiver assistance/cueing as needed.  Patient will complete modified barium swallow study (MBSS) for objective assessment of oropharyngeal swallow function, to assess for aspiration, and to guide further recommendations and treatment plan.      Subjective   Patient reports dysphagia, coughing/choking with meals, and globus sensation.      General Visit Information:  Patient Class: Inpatient  Living Environment: Home, Live with __, Other (comment) (spouse per chart)  Caregiver Feedback: RN reports patient appeared to tolerate meds  "crushed in puree and thin liquids this date. Patient has required assistance for feeding.  Ordering Physician: MAXIMILIANO Awan-CNP  Reason for Referral: Suspected oral or pharyngeal dysphagia  Past Medical History Relevant to Rehab: s/p ACDF C6-7 on 4/24; dementia, depression, hypertension, brain lesion, hyperlipidemia, hypothyroidism, alcohol withdrawal seizure,  EtOH, and recurrent falls  Developmental Status: Unknown  Patient Seen During This Visit: Yes  Total Number of Visits : 1  Prior to Session Communication: Bedside nurse  Date of Onset: 04/23/24  Date of Order: 04/29/24  [place] Diet: Regular solids and thin liquids  Current Diet : Regular solids and thin liquids  Dysphagia Diagnosis: Other (Comment) (suspected pharyngeal dysphagia)      Objective       Baseline Assessment:  Respiratory Status: Room air  History of Intubation: No  Behavior/Cognition: Confused, Other (comment) (perseverative, inconsistent in following commands)  Patient Positioning: Upright in Bed  Baseline Vocal Quality: Weak  Volitional Cough: Other (Comment) (elicited throat clear rather than cough)  Volitional Swallow: Other (Comment) (did not elicit or imitate)    Relevant Imaging:    Chest XR 4/29/24: \"IMPRESSION:  Left basilar airspace consolidation, as above. Clinical correlation  and continued follow-up until clearing is recommended.\"    MRI 4/28/24: \"IMPRESSION  * There is no evidence of mass, cerebral infarction or hemorrhage.\"    Pain:  Pain Assessment: 0-10  Pain Score: 3  Pain Location: Throat       Oral/Motor Assessment:  Oral Hygiene: Good  Dentition: Adequate/Natural  Oral Motor: Unable to Complete  Apraxia: None present  Intelligibility: Intelligible      Consistencies Trialed:  Consistencies Trialed: Yes  Consistencies Trialed: Thin (IDDSI Level 0) - Straw, Pureed/extremely thick (IDDSI Level 4), Soft & bite sized/chopped (IDDSI Level 6), Regular (IDDSI Level 7)      Clinical Observations:  Patient Positioning: " Upright in Bed  Management of Oral Secretions: Adequate  Was The 3 oz Swallow Protocol Completed: No, Other (Comment) (patient inconsistent in following commands)  Signs/Symptoms of Aspiration: Throat Clearing, Cough  Overt Signs or Symptoms of Aspiration: Thin (IDDSI Level 0) - Straw  Poor Management of Oral Secretions: No  Prolonged Oral Manipulation: Soft & Bite Sized/Chopped (IDDSI Level 6), Regular (IDDSI Level 7)  Multiple Swallows: All consistencies Trialed      Inpatient:    Education:  Learner patient;    Barriers to Learning acuteness of illness barrier; cognitive limitations barrier   Method demonstration; verbal   Education - Topic ST provided patient education regarding role of ST, purpose of assessment, clinical impressions, goals of treatment, and plan of care. Patient verbalized questionable full comprehension, consistent with cognitive status. Education will be reinforced. ST further coordinated with RN regarding recommendations and precautions per this assessment, with RN verbalizing understanding.     Outcome    Needs review/reinforcement

## 2024-04-29 NOTE — PROGRESS NOTES
Internal Medicine Progress Note    Patient Name: Katya Sandhu          MRN: [id number]  Today's Date: April 29, 2024          Attending: Toan Arriaga MD    Subjective:  Patient was seen and examined at bedside, pateint is drowsy today patient looks more oriented.    Review Of Systems:  Complains of neck pain  Negative for chest pain, shortness of breath, abdominal pain,    Objective:  Vitals:    04/29/24 0000 04/29/24 0400 04/29/24 0800 04/29/24 1200   BP: 130/71 150/80 112/68 107/64   BP Location: Right arm Right arm Right arm Right arm   Patient Position: Lying Lying Lying Lying   Pulse: 88 90 92 84   Resp: 16 16 18 18   Temp: 36.4 °C (97.5 °F) 36.7 °C (98.1 °F) 37 °C (98.6 °F) 36.5 °C (97.7 °F)   TempSrc: Temporal Temporal Temporal    SpO2: 97% 96% 99% 98%   Weight:       Height:           Physical Exam:   General appearance: Alert, in no acute distress  Lungs: Clear to auscultation, no wheezing or rhonchi  Heart: RRR without murmur, or rubs.   Abdomen: Soft, non-tender.  Neuro: Awake, cooperative    Labs:  Results for orders placed or performed during the hospital encounter of 04/23/24 (from the past 24 hour(s))   Basic Metabolic Panel   Result Value Ref Range    Glucose 75 74 - 99 mg/dL    Sodium 137 136 - 145 mmol/L    Potassium 4.2 3.5 - 5.3 mmol/L    Chloride 105 98 - 107 mmol/L    Bicarbonate 18 (L) 21 - 32 mmol/L    Anion Gap 18 10 - 20 mmol/L    Urea Nitrogen 13 6 - 23 mg/dL    Creatinine 0.55 0.50 - 1.05 mg/dL    eGFR >90 >60 mL/min/1.73m*2    Calcium 9.2 8.6 - 10.3 mg/dL   CBC   Result Value Ref Range    WBC 14.8 (H) 4.4 - 11.3 x10*3/uL    nRBC 0.0 0.0 - 0.0 /100 WBCs    RBC 3.75 (L) 4.00 - 5.20 x10*6/uL    Hemoglobin 12.2 12.0 - 16.0 g/dL    Hematocrit 36.6 36.0 - 46.0 %    MCV 98 80 - 100 fL    MCH 32.5 26.0 - 34.0 pg    MCHC 33.3 32.0 - 36.0 g/dL    RDW 13.0 11.5 - 14.5 %    Platelets 270 150 - 450 x10*3/uL   Ammonia   Result Value Ref Range    Ammonia 26 16 - 53 umol/L  "      Medications:  Scheduled medications  chlordiazePOXIDE, 25 mg, oral, q12h  cyclobenzaprine, 5 mg, oral, TID  folic acid, 1 mg, oral, Daily  heparin (porcine), 5,000 Units, subcutaneous, q8h  melatonin, 5 mg, oral, Nightly  multivitamin with minerals, 1 tablet, oral, Daily  polyethylene glycol, 17 g, oral, Daily  zonisamide, 200 mg, oral, Daily      Continuous medications  lactated Ringer's, 75 mL/hr, Last Rate: 75 mL/hr (04/29/24 0429)      PRN medications  PRN medications: acetaminophen, dextrose, dextrose, glucagon, glucagon, haloperidol lactate, HYDROmorphone, LORazepam, naloxone, ondansetron ODT **OR** ondansetron, oxyCODONE, oxyCODONE      Assessment/Plan:    Rhabdomyolysis  Resolved  Continue monitoring       ETOH abuse  Continue current medications and measures  Psychiatry is following       Fall  PT/OT       Elevated lactic acid level  IV fluids       Neck pain    Closed nondisplaced fracture of sixth cervical vertebra (Multi)  Patient underwent Anterior cervical C6-7 discectomy and fusion with plating  Reduction of fracture  Neurosurgery is following  Pain control      Mental status  Improving  Neurology and psychiatry following    Discussed with patient, RN    Toan Arriaga MD   Date: 04/29/24  Time: 12:57 PM    This note was partially created using voice recognition software and is inherently subject to errors including those of syntax and \"sound-alike\" substitutions which may escape proofreading. In such instances, original meaning may be extrapolated by contextual derivation    "

## 2024-04-29 NOTE — PROGRESS NOTES
04/29/24 1042   Discharge Planning   Living Arrangements Spouse/significant other   Support Systems Spouse/significant other   Type of Residence Private residence   Number of Stairs Within Residence 13   Who is requesting discharge planning? Provider   Home or Post Acute Services Post acute facilities (Rehab/SNF/etc)   Type of Post Acute Facility Services Skilled nursing   Patient expects to be discharged to: [facility]   Patient Choice   Provider Choice list and CMS website (https://medicare.gov/care-compare#search) for post-acute Quality and Resource Measure Data were provided and reviewed with: Family     [facility] will have a bed. Asked the DSC to start precert.     1106 Received auth.

## 2024-04-29 NOTE — NURSING NOTE
EOS note:   Beginning of shift.  Pt. Has been voiding well after enriquez removed on day shift.  External cath replaced once this shift.  IVF continued per order.   Pt. Tolerated meds crushed with applesauce.   C/o pain with her back, elbows and neck.  Pad and protect placed on elbows for comfort.    Pt. Repositioned with assistance q2h.   Mentation improving from previous day.   Pt. Is conversational and able to make some needs known.  Awaiting repeat MRI to be completed.  PT-OT to see pt. Today.  Will continue to monitor this shift.

## 2024-04-29 NOTE — PROGRESS NOTES
Physical Therapy    Physical Therapy Treatment    Patient Name: Katya Sandhu  MRN: [id number]  Today's Date: 4/29/2024  Time Calculation  Start Time: 0920  Stop Time: 0945  Time Calculation (min): 25 min        04/29/24 0920   PT  Visit   PT Received On 04/29/24   Response to Previous Treatment Patient with no complaints from previous session.   General   Prior to Session Communication Bedside nurse   Patient Position Received Bed, 3 rail up;Alarm on   Preferred Learning Style verbal;visual   General Comment patient lethargic but cooperative.  mostly mod to max assist to ccomplete all transfers, however, patient did tolerate dynamic activity edge of bed with good effort and followthrough.   Precautions   Medical Precautions Fall precautions   Post-Surgical Precautions Spinal precautions   Pain Assessment   Pain Assessment 0-10   Pain Score   (unable to rate)   Pain Type Chronic pain   Pain Location Back   Pain Orientation Upper   Pain Descriptors Aching   Pain Interventions Medication (See MAR);Repositioned   Cognition   Overall Cognitive Status Impaired   Orientation Level Disoriented to place;Disoriented to time;Disoriented to situation   Following Commands Follows one step commands with increased time   Safety Judgment Good awareness of safety precautions   Sustained Attention Impaired   Insight Severe   Impulsive Moderately   Task Initiation Delayed initiation   Processing Speed Delayed   Postural Control   Postural Control Impaired   Righting Reactions fair   Protective Responses fair   Posture Comment initial retro lean.  able to correct with activity seated edge of bed progressing to edge of bed sitting with self support.   Dynamic Sitting Balance   Dynamic Sitting-Balance Support Bilateral upper extremity supported   Dynamic Sitting-Balance Forward lean;Reaching for objects;Reaching across midline   Static Standing Balance   Static Standing-Balance Support Bilateral upper extremity supported  (self  support)   Static Standing-Level of Assistance Close supervision;Contact guard   Bed Mobility   Bed Mobility Yes   Bed Mobility 1   Bed Mobility 1 Supine to sitting   Level of Assistance 1 Maximum assistance   Ambulation/Gait Training   Ambulation/Gait Training Performed Yes   Ambulation/Gait Training 1   Surface 1 Level tile   Device 1 Rolling walker   Gait Support Devices Gait belt   Assistance 1 Moderate verbal cues   Quality of Gait 1 Decreased step length;Diminished heel strike;Forward flexed posture;Festinating;Shuffling gait   Comments/Distance (ft) 1 4'   Transfers   Transfer Yes   Transfer 1   Transfer From 1 Bed to   Transfer to 1 Stand   Technique 1 Sit to stand;Stand to sit   Transfer Device 1 Gait belt   Transfer Level of Assistance 1 Moderate assistance   Trials/Comments 1 HHA   Activity Tolerance   Endurance Tolerates 10 - 20 min exercise with multiple rests;Decreased tolerance for upright activites   PT Assessment   PT Assessment Results Decreased strength;Decreased endurance;Impaired balance;Decreased mobility   Rehab Prognosis Fair   End of Session Communication Bedside nurse   Assessment Comment increased difficulty with transfer this session.  lethargic and extremely unsteady for short ambulation to chair.  good tolerance to forward and cross midline reaching sitting edge of bed though limited by increased back pain.   End of Session Patient Position Up in chair;Alarm on   Outpatient Education   Individual(s) Educated Patient   Education Provided Fall Risk;Body Mechanics;Posture;POC   Risk and Benefits Discussed with Patient/Caregiver/Other yes   Patient/Caregiver Demonstrated Understanding yes   Plan of Care Discussed and Agreed Upon yes   Patient Response to Education Patient/Caregiver Verbalized Understanding of Information   PT Plan   Inpatient/Swing Bed or Outpatient Inpatient   PT Plan   Treatment/Interventions Bed mobility;Transfer training;Gait training;Strengthening;Therapeutic  exercise;Balance training   PT Plan Skilled PT   PT Frequency 5 times per week   PT Discharge Recommendations Moderate intensity level of continued care   Equipment Recommended upon Discharge Wheeled walker   PT Recommended Transfer Status Assist x2;Assistive device       Outcome Measures:  [facility] Basic Mobility  Turning from your back to your side while in a flat bed without using bedrails: A lot  Moving from lying on your back to sitting on the side of a flat bed without using bedrails: A lot  Moving to and from bed to chair (including a wheelchair): A lot  Standing up from a chair using your arms (e.g. wheelchair or bedside chair): A lot  To walk in hospital room: Total  Climbing 3-5 steps with railing: Total  Basic Mobility - Total Score: 10      EDUCATION:  Outpatient Education  Individual(s) Educated: Patient  Education Provided: Fall Risk, Body Mechanics, Posture, POC  Risk and Benefits Discussed with Patient/Caregiver/Other: yes  Patient/Caregiver Demonstrated Understanding: yes  Plan of Care Discussed and Agreed Upon: yes  Patient Response to Education: Patient/Caregiver Verbalized Understanding of Information      GOALS:  Encounter Problems       Encounter Problems (Active)       PT Problem       Pt will demonstrate mod I for all bed mobility   (Progressing)       Start:  04/25/24    Expected End:  05/01/24            Pt will demonstrate mod I for all transfers with WW  (Progressing)       Start:  04/25/24    Expected End:  05/01/24            Pt will ambulate 50 ft with WW and mod I (Progressing)       Start:  04/25/24    Expected End:  05/01/24            Pt will demonstrate good dynamic standing balance while performing a functional task.   (Progressing)       Start:  04/25/24    Expected End:  05/01/24               Pain - Adult

## 2024-04-30 ENCOUNTER — APPOINTMENT (OUTPATIENT)
Dept: RADIOLOGY | Facility: HOSPITAL | Age: 74
DRG: 028 | End: 2024-04-30
Payer: MEDICARE

## 2024-04-30 LAB
ANION GAP SERPL CALC-SCNC: 16 MMOL/L (ref 10–20)
BUN SERPL-MCNC: 11 MG/DL (ref 6–23)
CALCIUM SERPL-MCNC: 8.7 MG/DL (ref 8.6–10.3)
CHLORIDE SERPL-SCNC: 107 MMOL/L (ref 98–107)
CO2 SERPL-SCNC: 18 MMOL/L (ref 21–32)
CREAT SERPL-MCNC: 0.55 MG/DL (ref 0.5–1.05)
EGFRCR SERPLBLD CKD-EPI 2021: >90 ML/MIN/1.73M*2
ERYTHROCYTE [DISTWIDTH] IN BLOOD BY AUTOMATED COUNT: 13.2 % (ref 11.5–14.5)
GLUCOSE SERPL-MCNC: 93 MG/DL (ref 74–99)
HCT VFR BLD AUTO: 36.6 % (ref 36–46)
HGB BLD-MCNC: 12.1 G/DL (ref 12–16)
MCH RBC QN AUTO: 33.2 PG (ref 26–34)
MCHC RBC AUTO-ENTMCNC: 33.1 G/DL (ref 32–36)
MCV RBC AUTO: 101 FL (ref 80–100)
NRBC BLD-RTO: 0 /100 WBCS (ref 0–0)
PLATELET # BLD AUTO: 185 X10*3/UL (ref 150–450)
POTASSIUM SERPL-SCNC: 3.8 MMOL/L (ref 3.5–5.3)
RBC # BLD AUTO: 3.64 X10*6/UL (ref 4–5.2)
SODIUM SERPL-SCNC: 137 MMOL/L (ref 136–145)
WBC # BLD AUTO: 11.4 X10*3/UL (ref 4.4–11.3)

## 2024-04-30 PROCEDURE — 36415 COLL VENOUS BLD VENIPUNCTURE: CPT | Performed by: NURSE PRACTITIONER

## 2024-04-30 PROCEDURE — 1200000002 HC GENERAL ROOM WITH TELEMETRY DAILY

## 2024-04-30 PROCEDURE — 2500000004 HC RX 250 GENERAL PHARMACY W/ HCPCS (ALT 636 FOR OP/ED): Performed by: NURSE PRACTITIONER

## 2024-04-30 PROCEDURE — 92611 MOTION FLUOROSCOPY/SWALLOW: CPT | Mod: GN | Performed by: STUDENT IN AN ORGANIZED HEALTH CARE EDUCATION/TRAINING PROGRAM

## 2024-04-30 PROCEDURE — 97535 SELF CARE MNGMENT TRAINING: CPT | Mod: GO,CO

## 2024-04-30 PROCEDURE — 84520 ASSAY OF UREA NITROGEN: CPT | Mod: 91 | Performed by: NURSE PRACTITIONER

## 2024-04-30 PROCEDURE — 74230 X-RAY XM SWLNG FUNCJ C+: CPT

## 2024-04-30 PROCEDURE — 85027 COMPLETE CBC AUTOMATED: CPT | Performed by: NURSE PRACTITIONER

## 2024-04-30 PROCEDURE — 2500000006 HC RX 250 W HCPCS SELF ADMINISTERED DRUGS (ALT 637 FOR ALL PAYERS): Performed by: STUDENT IN AN ORGANIZED HEALTH CARE EDUCATION/TRAINING PROGRAM

## 2024-04-30 PROCEDURE — 2500000006 HC RX 250 W HCPCS SELF ADMINISTERED DRUGS (ALT 637 FOR ALL PAYERS): Performed by: NURSE PRACTITIONER

## 2024-04-30 PROCEDURE — 2500000001 HC RX 250 WO HCPCS SELF ADMINISTERED DRUGS (ALT 637 FOR MEDICARE OP): Performed by: STUDENT IN AN ORGANIZED HEALTH CARE EDUCATION/TRAINING PROGRAM

## 2024-04-30 PROCEDURE — 99232 SBSQ HOSP IP/OBS MODERATE 35: CPT | Performed by: PSYCHIATRY & NEUROLOGY

## 2024-04-30 PROCEDURE — 2500000004 HC RX 250 GENERAL PHARMACY W/ HCPCS (ALT 636 FOR OP/ED): Performed by: STUDENT IN AN ORGANIZED HEALTH CARE EDUCATION/TRAINING PROGRAM

## 2024-04-30 PROCEDURE — 74230 X-RAY XM SWLNG FUNCJ C+: CPT | Performed by: RADIOLOGY

## 2024-04-30 PROCEDURE — 2500000001 HC RX 250 WO HCPCS SELF ADMINISTERED DRUGS (ALT 637 FOR MEDICARE OP): Performed by: INTERNAL MEDICINE

## 2024-04-30 RX ORDER — ACETAMINOPHEN 500 MG
5 TABLET ORAL NIGHTLY PRN
Start: 2024-04-30 | End: 2024-05-30

## 2024-04-30 RX ORDER — FOLIC ACID 1 MG/1
1 TABLET ORAL DAILY
Qty: 30 TABLET | Refills: 1 | Status: SHIPPED | OUTPATIENT
Start: 2024-05-01 | End: 2024-06-30

## 2024-04-30 RX ORDER — MULTIVIT-MIN/IRON FUM/FOLIC AC 7.5 MG-4
1 TABLET ORAL DAILY
Qty: 30 TABLET | Refills: 1 | Status: SHIPPED | OUTPATIENT
Start: 2024-05-01 | End: 2024-06-30

## 2024-04-30 RX ORDER — POLYETHYLENE GLYCOL 3350 17 G/17G
17 POWDER, FOR SOLUTION ORAL DAILY
Qty: 30 PACKET | Refills: 0 | Status: SHIPPED | OUTPATIENT
Start: 2024-05-01 | End: 2024-05-31

## 2024-04-30 RX ORDER — POTASSIUM CHLORIDE 20 MEQ/1
20 TABLET, EXTENDED RELEASE ORAL ONCE
Status: COMPLETED | OUTPATIENT
Start: 2024-04-30 | End: 2024-04-30

## 2024-04-30 RX ORDER — OXYCODONE HYDROCHLORIDE 5 MG/1
5 TABLET ORAL EVERY 6 HOURS PRN
Status: DISCONTINUED | OUTPATIENT
Start: 2024-04-30 | End: 2024-05-01 | Stop reason: HOSPADM

## 2024-04-30 RX ADMIN — SODIUM CHLORIDE, POTASSIUM CHLORIDE, SODIUM LACTATE AND CALCIUM CHLORIDE 75 ML/HR: 600; 310; 30; 20 INJECTION, SOLUTION INTRAVENOUS at 23:49

## 2024-04-30 RX ADMIN — HEPARIN SODIUM 5000 UNITS: 5000 INJECTION INTRAVENOUS; SUBCUTANEOUS at 15:19

## 2024-04-30 RX ADMIN — Medication 1 TABLET: at 10:01

## 2024-04-30 RX ADMIN — HEPARIN SODIUM 5000 UNITS: 5000 INJECTION INTRAVENOUS; SUBCUTANEOUS at 10:01

## 2024-04-30 RX ADMIN — BARIUM SULFATE 25 ML: 400 SUSPENSION ORAL at 09:13

## 2024-04-30 RX ADMIN — HEPARIN SODIUM 5000 UNITS: 5000 INJECTION INTRAVENOUS; SUBCUTANEOUS at 23:46

## 2024-04-30 RX ADMIN — POLYETHYLENE GLYCOL 3350 17 G: 17 POWDER, FOR SOLUTION ORAL at 10:02

## 2024-04-30 RX ADMIN — ZONISAMIDE 200 MG: 100 CAPSULE ORAL at 10:02

## 2024-04-30 RX ADMIN — BARIUM SULFATE 90 ML: 0.81 POWDER, FOR SUSPENSION ORAL at 09:07

## 2024-04-30 RX ADMIN — SODIUM CHLORIDE, POTASSIUM CHLORIDE, SODIUM LACTATE AND CALCIUM CHLORIDE 75 ML/HR: 600; 310; 30; 20 INJECTION, SOLUTION INTRAVENOUS at 11:49

## 2024-04-30 RX ADMIN — BARIUM SULFATE 10 ML: 400 PASTE ORAL at 09:17

## 2024-04-30 RX ADMIN — POTASSIUM CHLORIDE 20 MEQ: 1500 TABLET, EXTENDED RELEASE ORAL at 10:09

## 2024-04-30 RX ADMIN — PIPERACILLIN SODIUM AND TAZOBACTAM SODIUM 3.38 G: 3; .375 INJECTION, SOLUTION INTRAVENOUS at 11:49

## 2024-04-30 RX ADMIN — FOLIC ACID 1 MG: 1 TABLET ORAL at 10:02

## 2024-04-30 ASSESSMENT — LIFESTYLE VARIABLES
AGITATION: NORMAL ACTIVITY
AUDITORY DISTURBANCES: NOT PRESENT
TOTAL SCORE: 0
BLOOD PRESSURE: 135/80
HEADACHE, FULLNESS IN HEAD: NOT PRESENT
PAROXYSMAL SWEATS: NO SWEAT VISIBLE
PULSE: 82
TREMOR: NO TREMOR
VISUAL DISTURBANCES: NOT PRESENT
ANXIETY: NO ANXIETY, AT EASE
AGITATION: NORMAL ACTIVITY
HEADACHE, FULLNESS IN HEAD: NOT PRESENT
ORIENTATION AND CLOUDING OF SENSORIUM: ORIENTED AND CAN DO SERIAL ADDITIONS
PAROXYSMAL SWEATS: NO SWEAT VISIBLE
NAUSEA AND VOMITING: NO NAUSEA AND NO VOMITING
AGITATION: NORMAL ACTIVITY
NAUSEA AND VOMITING: NO NAUSEA AND NO VOMITING
HEADACHE, FULLNESS IN HEAD: NOT PRESENT
VISUAL DISTURBANCES: NOT PRESENT
ANXIETY: NO ANXIETY, AT EASE
TOTAL SCORE: 0
VISUAL DISTURBANCES: NOT PRESENT
AUDITORY DISTURBANCES: NOT PRESENT
TREMOR: NO TREMOR
ANXIETY: NO ANXIETY, AT EASE
NAUSEA AND VOMITING: NO NAUSEA AND NO VOMITING
TOTAL SCORE: 0
AUDITORY DISTURBANCES: NOT PRESENT
ORIENTATION AND CLOUDING OF SENSORIUM: ORIENTED AND CAN DO SERIAL ADDITIONS
TREMOR: NO TREMOR
ORIENTATION AND CLOUDING OF SENSORIUM: ORIENTED AND CAN DO SERIAL ADDITIONS
PAROXYSMAL SWEATS: NO SWEAT VISIBLE

## 2024-04-30 ASSESSMENT — PAIN SCALES - GENERAL
PAINLEVEL_OUTOF10: 0 - NO PAIN
PAINLEVEL_OUTOF10: 0 - NO PAIN

## 2024-04-30 ASSESSMENT — ACTIVITIES OF DAILY LIVING (ADL)
BATHING_LEVEL_OF_ASSISTANCE: MAXIMUM ASSISTANCE
HOME_MANAGEMENT_TIME_ENTRY: 57
BATHING_WHERE_ASSESSED: EDGE OF BED

## 2024-04-30 ASSESSMENT — COGNITIVE AND FUNCTIONAL STATUS - GENERAL
HELP NEEDED FOR BATHING: A LOT
PERSONAL GROOMING: A LOT
DAILY ACTIVITIY SCORE: 12
DRESSING REGULAR LOWER BODY CLOTHING: A LOT
TOILETING: A LOT
DRESSING REGULAR UPPER BODY CLOTHING: A LOT
EATING MEALS: A LOT

## 2024-04-30 NOTE — NURSING NOTE
0700: took over care for this pt    0900: Pt down for MBS  0910:  at bedside waiting for pt to get back from test, updated him on care    1400: Pt up in chair

## 2024-04-30 NOTE — PROGRESS NOTES
Katya Sandhu is a 74 y.o. female on day 6 of admission presenting with Rhabdomyolysis.      Subjective   The patient was followed up for further evaluation ofL     Principal Problem:    Rhabdomyolysis  Active Problems:    ETOH abuse    Fall    Closed head injury    Elevated lactic acid level    Neck pain    Alcohol withdrawal seizure, with unspecified complication (Multi)    Closed nondisplaced fracture of sixth cervical vertebra (Multi)     Subjective:    Keiry presents today feeling generally lethargic, which she attributes to her medications. She reports no issues with eating or swallowing and is currently able to drink without problems. Keiry does not mention experiencing any headaches or other specific symptoms during this visit.    Medical History:  - Recent alcohol withdrawal  - Scheduled for an MRI to further investigate her condition  - Past medical history includes a decompression of the cervical spine    Scheduled medications  cyclobenzaprine, 5 mg, oral, TID  folic acid, 1 mg, oral, Daily  heparin (porcine), 5,000 Units, subcutaneous, q8h  multivitamin with minerals, 1 tablet, oral, Daily  polyethylene glycol, 17 g, oral, Daily  zonisamide, 200 mg, oral, Daily      Continuous medications  lactated Ringer's, 75 mL/hr, Last Rate: 75 mL/hr (04/29/24 2045)      PRN medications  PRN medications: acetaminophen, dextrose, dextrose, glucagon, glucagon, haloperidol lactate, HYDROmorphone, melatonin, naloxone, ondansetron ODT **OR** ondansetron, oxyCODONE, oxyCODONE      Results for orders placed or performed during the hospital encounter of 04/23/24 (from the past 96 hour(s))   CBC   Result Value Ref Range    WBC 11.8 (H) 4.4 - 11.3 x10*3/uL    nRBC 0.0 0.0 - 0.0 /100 WBCs    RBC 4.34 4.00 - 5.20 x10*6/uL    Hemoglobin 14.2 12.0 - 16.0 g/dL    Hematocrit 43.4 36.0 - 46.0 %     80 - 100 fL    MCH 32.7 26.0 - 34.0 pg    MCHC 32.7 32.0 - 36.0 g/dL    RDW 13.2 11.5 - 14.5 %    Platelets 147 (L)  150 - 450 x10*3/uL   Basic Metabolic Panel   Result Value Ref Range    Glucose 96 74 - 99 mg/dL    Sodium 135 (L) 136 - 145 mmol/L    Potassium 3.9 3.5 - 5.3 mmol/L    Chloride 104 98 - 107 mmol/L    Bicarbonate 19 (L) 21 - 32 mmol/L    Anion Gap 16 10 - 20 mmol/L    Urea Nitrogen 18 6 - 23 mg/dL    Creatinine 0.51 0.50 - 1.05 mg/dL    eGFR >90 >60 mL/min/1.73m*2    Calcium 8.7 8.6 - 10.3 mg/dL   CBC   Result Value Ref Range    WBC 9.3 4.4 - 11.3 x10*3/uL    nRBC 0.0 0.0 - 0.0 /100 WBCs    RBC 3.90 (L) 4.00 - 5.20 x10*6/uL    Hemoglobin 13.0 12.0 - 16.0 g/dL    Hematocrit 38.1 36.0 - 46.0 %    MCV 98 80 - 100 fL    MCH 33.3 26.0 - 34.0 pg    MCHC 34.1 32.0 - 36.0 g/dL    RDW 13.1 11.5 - 14.5 %    Platelets 252 150 - 450 x10*3/uL   Basic Metabolic Panel   Result Value Ref Range    Glucose 105 (H) 74 - 99 mg/dL    Sodium 134 (L) 136 - 145 mmol/L    Potassium 3.5 3.5 - 5.3 mmol/L    Chloride 104 98 - 107 mmol/L    Bicarbonate 21 21 - 32 mmol/L    Anion Gap 13 10 - 20 mmol/L    Urea Nitrogen 21 6 - 23 mg/dL    Creatinine 0.50 0.50 - 1.05 mg/dL    eGFR >90 >60 mL/min/1.73m*2    Calcium 8.7 8.6 - 10.3 mg/dL   POCT GLUCOSE   Result Value Ref Range    POCT Glucose 100 (H) 74 - 99 mg/dL   CBC   Result Value Ref Range    WBC 12.2 (H) 4.4 - 11.3 x10*3/uL    nRBC 0.0 0.0 - 0.0 /100 WBCs    RBC 3.69 (L) 4.00 - 5.20 x10*6/uL    Hemoglobin 12.3 12.0 - 16.0 g/dL    Hematocrit 35.9 (L) 36.0 - 46.0 %    MCV 97 80 - 100 fL    MCH 33.3 26.0 - 34.0 pg    MCHC 34.3 32.0 - 36.0 g/dL    RDW 12.9 11.5 - 14.5 %    Platelets 266 150 - 450 x10*3/uL   Basic Metabolic Panel   Result Value Ref Range    Glucose 111 (H) 74 - 99 mg/dL    Sodium 137 136 - 145 mmol/L    Potassium 3.6 3.5 - 5.3 mmol/L    Chloride 105 98 - 107 mmol/L    Bicarbonate 21 21 - 32 mmol/L    Anion Gap 15 10 - 20 mmol/L    Urea Nitrogen 19 6 - 23 mg/dL    Creatinine 0.50 0.50 - 1.05 mg/dL    eGFR >90 >60 mL/min/1.73m*2    Calcium 8.6 8.6 - 10.3 mg/dL   Basic Metabolic Panel  "  Result Value Ref Range    Glucose 75 74 - 99 mg/dL    Sodium 137 136 - 145 mmol/L    Potassium 4.2 3.5 - 5.3 mmol/L    Chloride 105 98 - 107 mmol/L    Bicarbonate 18 (L) 21 - 32 mmol/L    Anion Gap 18 10 - 20 mmol/L    Urea Nitrogen 13 6 - 23 mg/dL    Creatinine 0.55 0.50 - 1.05 mg/dL    eGFR >90 >60 mL/min/1.73m*2    Calcium 9.2 8.6 - 10.3 mg/dL   CBC   Result Value Ref Range    WBC 14.8 (H) 4.4 - 11.3 x10*3/uL    nRBC 0.0 0.0 - 0.0 /100 WBCs    RBC 3.75 (L) 4.00 - 5.20 x10*6/uL    Hemoglobin 12.2 12.0 - 16.0 g/dL    Hematocrit 36.6 36.0 - 46.0 %    MCV 98 80 - 100 fL    MCH 32.5 26.0 - 34.0 pg    MCHC 33.3 32.0 - 36.0 g/dL    RDW 13.0 11.5 - 14.5 %    Platelets 270 150 - 450 x10*3/uL   Ammonia   Result Value Ref Range    Ammonia 26 16 - 53 umol/L   Procalcitonin   Result Value Ref Range    Procalcitonin 0.09 (H) <=0.07 ng/mL   MRSA Surveillance for Vancomycin De-escalation, PCR    Specimen: Anterior Nares; Swab   Result Value Ref Range    MRSA PCR Not Detected Not Detected       Past Medical History:   Diagnosis Date    Brain lesion     Depression     ETOH abuse     HLD (hyperlipidemia)     Hypertension     Hypothyroidism            Vital Signs:  /67 (BP Location: Right arm, Patient Position: Lying)   Pulse 90   Temp 37.1 °C (98.8 °F) (Temporal)   Resp 18   Ht 1.651 m (5' 5\")   Wt 54.9 kg (121 lb 0.5 oz)   LMP  (LMP Unknown)   SpO2 98%   BMI 20.14 kg/m²       Physical Examination:  - General: Patient appeared lethargic, potentially related to medication effects.  - Neurological: No facial asymmetry observed; patient able to perform tongue protrusion and shoulder shrugging without difficulty; able to hold arms outstretched and apply downward pressure.  - Swallowing: No difficulties reported or observed.    Diagnostic Test Results and Labs:  - MRI of the brain: Pending, not yet performed.  - History of alcohol withdrawal mentioned, no specific labs or tests provided.  - Previous surgical history of " decompression of the cervical spine noted, no specific date or details provided.                         Assessment/Plan      Principal Problem:    Rhabdomyolysis  Active Problems:    ETOH abuse    Fall    Closed head injury    Elevated lactic acid level    Neck pain    Alcohol withdrawal seizure, with unspecified complication (Multi)    Closed nondisplaced fracture of sixth cervical vertebra (Multi)     Alcohol Withdrawal    - Assessment: Currently experiencing symptoms of alcohol withdrawal.  - Plan:    - Continue monitoring symptoms.     - MRI of the brain is pending to assess neurological status further.    - Refer to physical therapy for evaluation and appropriate therapy.    - Refer to social work for assessment and support.    - Psychiatric evaluation as agreed.         I spent 30 minutes in the professional and overall care of this patient.      Kenna Casillas MD

## 2024-04-30 NOTE — PROGRESS NOTES
"Katya Sandhu is a 74 y.o. female on day 7 of admission presenting with Rhabdomyolysis.    SUBJECTIVE:    Patient is awake and oriented x 1  Out of bed, sitting in chair, want to be fed. Feeling hungry  Poor historian, sec to cognitive decline  Pt to go to SNF on discharge    Exam:  Vital Signs: /77 (BP Location: Right arm, Patient Position: Sitting)   Pulse 84   Temp 37.1 °C (98.8 °F) (Temporal)   Resp 17   Ht 1.651 m (5' 5\")   Wt 54.9 kg (121 lb 0.5 oz)   LMP  (LMP Unknown)   SpO2 93%   BMI 20.14 kg/m²   Musculoskeletal: normal  Gait/Station: in bed      Mental Status Exam:  General Appearance:   Speech: incoherent  Mood: anxious  Affect: anxious  Thought Process: [place]  Thought Associations: Mild loosening of associations  Thought Content: denies SI  Perception: denies  Level of Consciousness: Alert  Orientation:  disoriented  Attention and Concentration: Mild impairment in attention  Cognition:  Insight: poor  Judgment: poor     Results for orders placed or performed during the hospital encounter of 04/23/24 (from the past 96 hour(s))   CBC   Result Value Ref Range    WBC 9.3 4.4 - 11.3 x10*3/uL    nRBC 0.0 0.0 - 0.0 /100 WBCs    RBC 3.90 (L) 4.00 - 5.20 x10*6/uL    Hemoglobin 13.0 12.0 - 16.0 g/dL    Hematocrit 38.1 36.0 - 46.0 %    MCV 98 80 - 100 fL    MCH 33.3 26.0 - 34.0 pg    MCHC 34.1 32.0 - 36.0 g/dL    RDW 13.1 11.5 - 14.5 %    Platelets 252 150 - 450 x10*3/uL   Basic Metabolic Panel   Result Value Ref Range    Glucose 105 (H) 74 - 99 mg/dL    Sodium 134 (L) 136 - 145 mmol/L    Potassium 3.5 3.5 - 5.3 mmol/L    Chloride 104 98 - 107 mmol/L    Bicarbonate 21 21 - 32 mmol/L    Anion Gap 13 10 - 20 mmol/L    Urea Nitrogen 21 6 - 23 mg/dL    Creatinine 0.50 0.50 - 1.05 mg/dL    eGFR >90 >60 mL/min/1.73m*2    Calcium 8.7 8.6 - 10.3 mg/dL   POCT GLUCOSE   Result Value Ref Range    POCT Glucose 100 (H) 74 - 99 mg/dL   CBC   Result Value Ref Range    WBC 12.2 (H) 4.4 - 11.3 x10*3/uL "    nRBC 0.0 0.0 - 0.0 /100 WBCs    RBC 3.69 (L) 4.00 - 5.20 x10*6/uL    Hemoglobin 12.3 12.0 - 16.0 g/dL    Hematocrit 35.9 (L) 36.0 - 46.0 %    MCV 97 80 - 100 fL    MCH 33.3 26.0 - 34.0 pg    MCHC 34.3 32.0 - 36.0 g/dL    RDW 12.9 11.5 - 14.5 %    Platelets 266 150 - 450 x10*3/uL   Basic Metabolic Panel   Result Value Ref Range    Glucose 111 (H) 74 - 99 mg/dL    Sodium 137 136 - 145 mmol/L    Potassium 3.6 3.5 - 5.3 mmol/L    Chloride 105 98 - 107 mmol/L    Bicarbonate 21 21 - 32 mmol/L    Anion Gap 15 10 - 20 mmol/L    Urea Nitrogen 19 6 - 23 mg/dL    Creatinine 0.50 0.50 - 1.05 mg/dL    eGFR >90 >60 mL/min/1.73m*2    Calcium 8.6 8.6 - 10.3 mg/dL   Basic Metabolic Panel   Result Value Ref Range    Glucose 75 74 - 99 mg/dL    Sodium 137 136 - 145 mmol/L    Potassium 4.2 3.5 - 5.3 mmol/L    Chloride 105 98 - 107 mmol/L    Bicarbonate 18 (L) 21 - 32 mmol/L    Anion Gap 18 10 - 20 mmol/L    Urea Nitrogen 13 6 - 23 mg/dL    Creatinine 0.55 0.50 - 1.05 mg/dL    eGFR >90 >60 mL/min/1.73m*2    Calcium 9.2 8.6 - 10.3 mg/dL   CBC   Result Value Ref Range    WBC 14.8 (H) 4.4 - 11.3 x10*3/uL    nRBC 0.0 0.0 - 0.0 /100 WBCs    RBC 3.75 (L) 4.00 - 5.20 x10*6/uL    Hemoglobin 12.2 12.0 - 16.0 g/dL    Hematocrit 36.6 36.0 - 46.0 %    MCV 98 80 - 100 fL    MCH 32.5 26.0 - 34.0 pg    MCHC 33.3 32.0 - 36.0 g/dL    RDW 13.0 11.5 - 14.5 %    Platelets 270 150 - 450 x10*3/uL   Ammonia   Result Value Ref Range    Ammonia 26 16 - 53 umol/L   Procalcitonin   Result Value Ref Range    Procalcitonin 0.09 (H) <=0.07 ng/mL   MRSA Surveillance for Vancomycin De-escalation, PCR    Specimen: Anterior Nares; Swab   Result Value Ref Range    MRSA PCR Not Detected Not Detected   CBC   Result Value Ref Range    WBC 11.4 (H) 4.4 - 11.3 x10*3/uL    nRBC 0.0 0.0 - 0.0 /100 WBCs    RBC 3.64 (L) 4.00 - 5.20 x10*6/uL    Hemoglobin 12.1 12.0 - 16.0 g/dL    Hematocrit 36.6 36.0 - 46.0 %     (H) 80 - 100 fL    MCH 33.2 26.0 - 34.0 pg    MCHC 33.1  32.0 - 36.0 g/dL    RDW 13.2 11.5 - 14.5 %    Platelets 185 150 - 450 x10*3/uL   Basic Metabolic Panel   Result Value Ref Range    Glucose 93 74 - 99 mg/dL    Sodium 137 136 - 145 mmol/L    Potassium 3.8 3.5 - 5.3 mmol/L    Chloride 107 98 - 107 mmol/L    Bicarbonate 18 (L) 21 - 32 mmol/L    Anion Gap 16 10 - 20 mmol/L    Urea Nitrogen 11 6 - 23 mg/dL    Creatinine 0.55 0.50 - 1.05 mg/dL    eGFR >90 >60 mL/min/1.73m*2    Calcium 8.7 8.6 - 10.3 mg/dL         Impression:   Alcohol use disorder  Delirium-resolving  Dementia    Recommendations:    Delirium improving.   Can be discharged when medically stable to SNF    Thank you for allowing us to participate in the care of this patient.       Yury Mohamud MD  4/30/2024  3:20 PM2

## 2024-04-30 NOTE — PROGRESS NOTES
"Katya Sandhu is a 74 y.o. female on day 7 of admission presenting with Rhabdomyolysis.      Subjective   Pt up in recliner chair.  Falls asleep frequently during evaluation but answers most questions appropriately.       Objective     Last Recorded Vitals  Blood pressure 126/77, pulse 84, temperature 37.1 °C (98.8 °F), temperature source Temporal, resp. rate 17, height 1.651 m (5' 5\"), weight 54.9 kg (121 lb 0.5 oz), SpO2 93%.    Physical Exam  Neurological Exam  Mental Status: Lethargic. Oriented to person, place. Speech was slow but clear. Naming, repetition  were intact.   CN: (CN2)- Blinks to threat. (CN 2,3) PERRL. (CN 3,4,6) EOMI (CN 5)Facial sensation was intact to light touch bilaterally. (CN 7)Facial expression was symmetric (CN 12) Tongue protruded midline.   Motor: Normal muscle bulk and tone. Strength (confrontation testing) was (R/L) 5/5 shoulder abduction, elbow flexion/extension,  strenght, hip flexion, knee flexion/extension, ankle dorsi- and plantar flexion. There were no abnormal movements.   Sensory: Intact to light touch in all 4 extremities. Coordination (cerebellar function): Finger to nose slow but intact with no dysmetria.   Gait: deferred    Relevant Results  Scheduled medications  folic acid, 1 mg, oral, Daily  heparin (porcine), 5,000 Units, subcutaneous, q8h  multivitamin with minerals, 1 tablet, oral, Daily  polyethylene glycol, 17 g, oral, Daily  zonisamide, 200 mg, oral, Daily      Continuous medications  lactated Ringer's, 75 mL/hr, Last Rate: 75 mL/hr (04/30/24 1615)      PRN medications  PRN medications: acetaminophen, dextrose, dextrose, glucagon, glucagon, haloperidol lactate, melatonin, naloxone, ondansetron ODT **OR** ondansetron, oxyCODONE.  Results for orders placed or performed during the hospital encounter of 04/23/24 (from the past 24 hour(s))   CBC   Result Value Ref Range    WBC 11.4 (H) 4.4 - 11.3 x10*3/uL    nRBC 0.0 0.0 - 0.0 /100 WBCs    RBC 3.64 (L) " 4.00 - 5.20 x10*6/uL    Hemoglobin 12.1 12.0 - 16.0 g/dL    Hematocrit 36.6 36.0 - 46.0 %     (H) 80 - 100 fL    MCH 33.2 26.0 - 34.0 pg    MCHC 33.1 32.0 - 36.0 g/dL    RDW 13.2 11.5 - 14.5 %    Platelets 185 150 - 450 x10*3/uL   Basic Metabolic Panel   Result Value Ref Range    Glucose 93 74 - 99 mg/dL    Sodium 137 136 - 145 mmol/L    Potassium 3.8 3.5 - 5.3 mmol/L    Chloride 107 98 - 107 mmol/L    Bicarbonate 18 (L) 21 - 32 mmol/L    Anion Gap 16 10 - 20 mmol/L    Urea Nitrogen 11 6 - 23 mg/dL    Creatinine 0.55 0.50 - 1.05 mg/dL    eGFR >90 >60 mL/min/1.73m*2    Calcium 8.7 8.6 - 10.3 mg/dL       Scheduled medications  folic acid, 1 mg, oral, Daily  heparin (porcine), 5,000 Units, subcutaneous, q8h  multivitamin with minerals, 1 tablet, oral, Daily  polyethylene glycol, 17 g, oral, Daily  zonisamide, 200 mg, oral, Daily      Continuous medications  lactated Ringer's, 75 mL/hr, Last Rate: 75 mL/hr (04/30/24 1687)      PRN medications  PRN medications: acetaminophen, dextrose, dextrose, glucagon, glucagon, haloperidol lactate, melatonin, naloxone, ondansetron ODT **OR** ondansetron, oxyCODONE    FL modified barium swallow study    Result Date: 4/30/2024  Interpreted By:  Les Palafox,  and Kaylan Mitchell STUDY: FL MODIFIED BARIUM SWALLOW STUDY;; 4/30/2024 9:38 am   INDICATION: Signs/Symptoms:s/p ACDF, rule out aspiration.   COMPARISON: None.   ACCESSION NUMBER(S): [id number]   ORDERING CLINICIAN: PAULA LAGOS   TECHNIQUE: Modified Barium Swallow Study completed after informed verbal consent. Trials of thin, nectar/mildly thick liquid, puree and regular solids were given.   Paula Kimbrough MA, CCC-SLP Phone/Pager: Epic/[employer] secure chat   SPEECH FINDINGS: Reason for referral: Suspected oropharyngeal dysphagia during the clinical swallow evaluation. Patient hx: s/p ACDF C6-7 on 4/24; dementia, depression, hypertension, brain lesion, hyperlipidemia, hypothyroidism, alcohol withdrawal seizure,  EtOH, and recurrent falls Respiratory status: WFL Current diet: Regular/thin   FINAL SPEECH RECOMMENDATIONS DIET: Regular/thin STRATEGIES: -Sit upright 90 degrees for all PO -Remain upright 20-30 minutes after meals -Feed/eat at a slow rate -Small bite/sip -Swallow 2-3 times per bite/sip -Alternate liquids/solids -Medications crushed in puree -Diligent and regular oral Care   Plan: Treatment/Interventions: Pharyngeal exercises, Oral motor exercises, Patient/family education, Bolus trials. Diet tolerance/advancement SLP Plan: Skilled SLP warranted SLP Frequency: 1-2 follow up sessions Duration:   Discussed POC: Patient Discussed Risks/Benefits: Yes Patient/Caregiver Agreeable: Yes   Goals (established 4/29/24): 1. Patient will tolerate baseline diet absent of overt s/s of aspiration in 90% of observed therapeutic trials. Status: Progressing, continue with goal   2. Patient will implement safe swallowing strategies to reduce risk of aspiration in 90% of trials given caregiver assistance/cueing as needed. Status: Progressing, continue with goal   3. Patient will complete modified barium swallow study (MBSS) for objective assessment of oropharyngeal swallow function, to assess for aspiration, and to guide further recommendations and treatment plan. GOAL MET   Long term goal: Maintain adequate nutrition and hydration with the least restrictive oral diet with no overt s/s of aspiration or pulmonary compromise.   Education provided: ST provided education to Patient regarding MBSS impressions, recommendations and POC at this time. Verbal understanding and agreement given on all accounts.   Additional consult suggested: No   Repeat study/ dc plan: No   Mechanics of the swallow summary: ORAL PHASE: Bolus prep/mastication - Slow prolonged mastication with complete re-collection necessary Bolus transport/lingual motion - Slowed Tongue Motion for A-P movement of the bolus   PHARYNGEAL PHASE: Initiation of pharyngeal swallow -  Bolus head at vallecular pit Laryngeal elevation - Partial superior movement of thyroid cartilage and/or partial approximation of arytenoids to epiglottic petiole Anterior hyoid excursion - No anterior movement Epiglottic movement - Partial inversion, inconsistently Laryngeal vestibule closure - Incomplete - narrow column of air/contrast in laryngeal vestibule Pharyngeal stripping wave - Present, however, diminished Pharyngoesophageal segment opening - Partial distension/partial duration with partial obstruction of flow of bolus Tongue base retraction - Trace column of contrast or air between tongue base and pharyngeal wall Pharyngeal residue - Collection of residue within or on the pharyngeal structures   ESOPHAGEAL PHASE: Esophageal clearance - Not evaluated   SLP Impressions with severity rating: Pt presents with mild oropharyngeal dysphagia characterized by deficits noted above. Most significant deficits impacting swallowing safety and efficiency include partial distention and duration of the PES, incomplete laryngeal closure and diminished stripping wave. Transient penetration observed with thin liquids across all trials. Collection of residues on or within the laryngeal structures increased proportional to the viscosity of the bolus. Trace-mild pharyngeal residues with thin. Mild vallecular residues and trace pyriform sinus residues observed with mildly thick (nectar). Collection of puree residues remained in the vallecula despite multiple spontaneous swallows. Majority of the solid bolus remained in the pharynx after the initial swallow. Subsequent swallow partially cleared residues to moderate. A liquid wash following the solid trial did not significantly clear residues. No aspiration was observed with any trial. Based on the results of the study it is recommended the patient continue with the current diet and aspiration precautions above.   Functional Oral Intake Scale Functional Oral Intake Scale: Level 7         total oral diet with no restrictions   Rosenbek's Penetration Aspiration Scale Thin Liquids: 2. PENETRATION that CLEARS - contrast enter airway, above vocal cords, no residue Nectar Thick Liquids: 1. NO ASPIRATION & NO PENETRATION - no aspiration, contrast does not enter airway Puree: 1. NO ASPIRATION & NO PENETRATION - no aspiration, contrast does not enter airway Solids: 1. NO ASPIRATION & NO PENETRATION - no aspiration, contrast does not enter airway   Speech Therapy section of this report signed by Paula Kimbrough MA, CCC-SLP on 4/30/2024 at 10:05 am.   RADIOLOGY FINDINGS: Anterior fusion hardware at C6-7.   Radiology section of this report signed by Les Palafox.       No aspiration observed.   MACRO: None   Signed by: Les Palafox 4/30/2024 11:08 AM Dictation workstation:   [id number]    MR brain wo IV contrast    Result Date: 4/29/2024  Interpreted By:  Jose Lim, STUDY: MR BRAIN WO IV CONTRAST;  4/29/2024 1:04 pm   INDICATION: Signs/Symptoms:AMS.   COMPARISON: None.   ACCESSION NUMBER(S): [id number]   ORDERING CLINICIAN: ADIA GROSS   TECHNIQUE: The brain was studied in the sagittal axial and coronal planes utilizing FLAIR, T1 and T2 weighted images. Examination is limited due to motion artifact   FINDINGS: There is moderate prominence of the cortical sulci and sylvian fissures. There is moderate ventricular dilatation.  There are a few scattered foci of abnormal signal within the basal ganglia and subcortical white matter bilaterally.  These are compatible with minimal small vessel ischemic changes.  These nonspecific findings could also be produced by a demyelinating or post inflammatory process.  The visualized skull base paranasal sinuses and orbital structures are unremarkable. Diffusion weighted images and associated ADC maps of the brain were unremarkable.  There is no evidence of diffusion restriction to suggest the presence of acute infarction. Gradient echo T2 weighted images  fail to demonstrate hemosiderin deposition or other evidence of hemorrhage.   IMPRESSION * There is no evidence of mass, cerebral infarction or hemorrhage.     MACRO: none   Signed by: Jose Lim 4/29/2024 1:42 PM Dictation workstation:   [id number]    XR chest 1 view    Result Date: 4/29/2024  Interpreted By:  Tray Tejada, STUDY: XR CHEST 1 VIEW  4/29/2024 12:21 pm   INDICATION: Signs/Symptoms:leukocytosis   COMPARISON: 05/06/2022   ACCESSION NUMBER(S): [id number]   ORDERING CLINICIAN: TRUONG GARCIA   TECHNIQUE: A single AP portable radiograph of the chest was obtained.   FINDINGS: Multiple cardiac monitoring leads are seen over the chest.  Left basilar airspace consolidation is seen and may represent small pleural effusion, atelectasis and/or pneumonia. No pneumothorax is identified. The cardiac silhouette is enlarged, similar to prior studies.       Left basilar airspace consolidation, as above. Clinical correlation and continued follow-up until clearing is recommended.   MACRO: None.   Signed by: Tray Tejada 4/29/2024 1:12 PM Dictation workstation:   [id number]    CT head wo IV contrast    Result Date: 4/26/2024  Interpreted By:  Elan Gilliland, STUDY: CT HEAD WO IV CONTRAST;  4/25/2024 5:20 pm   INDICATION: Signs/Symptoms:perseverating.   COMPARISON: 04/23/2024   ACCESSION NUMBER(S): [id number]   ORDERING CLINICIAN: ALKA GOMEZ   TECHNIQUE: Axial CT images of the head were obtained without intravenous contrast administration.   FINDINGS: There is again evidence of moderate brain parenchymal volume loss   Nonspecific white matter changes are again noted within the cerebral hemispheres bilaterally which while nonspecific, given the patient's age, likely represent sequelae of small-vessel ischemic change.   No hyperdense acute intracranial hemorrhage is noted.   There is no midline shift.   The visualized paranasal sinuses and mastoid air cells are clear.   No acute fracture is noted.   There  is persistent but improved extracranial soft tissue swelling noted within the right parietal scalp when compared with 04/23/2024 likely posttraumatic in etiology.       There is again evidence of moderate brain parenchymal volume loss.   Nonspecific white matter changes are again noted within the cerebral hemispheres bilaterally which while nonspecific, given the patient's age, likely represent sequelae of small-vessel ischemic change.   There is persistent but improved extracranial soft tissue swelling noted within the right parietal scalp when compared with 04/23/2024 likely posttraumatic in etiology.   MACRO: None.   Signed by: Elan Gilliland 4/26/2024 6:29 AM Dictation workstation:   [id number]    FL fluoro images no charge    Result Date: 4/24/2024  These images are not reportable by radiology and will not be interpreted by  Radiologists.    CT head wo IV contrast    Addendum Date: 4/24/2024    Interpreted By:  Guerrero Zurita, ADDENDUM: Especially with the benefit of subsequently performed MRI, review of the preceding CT shows interval new splaying of the posterior elements at C6-7; probably acute fragmentation of the lower tip of the left facet and near perching of facets also on the left. Focal kyphosis at same level. Cord impingement confirmed on subsequent MRI   Signed by: Guerrero Zurita 4/24/2024 3:00 PM   -------- ORIGINAL REPORT -------- Dictation workstation:   [id number]    Result Date: 4/24/2024  Interpreted By:  Guerrero Zurita, STUDY: CT HEAD WO IV CONTRAST; CT CERVICAL SPINE WO IV CONTRAST;  4/23/2024 10:20 am   INDICATION: Signs/Symptoms:Fall head injury.   COMPARISON: CT brain and cervical spine both from 28 December 2023   ACCESSION NUMBER(S): [id number]; [id number]   ORDERING CLINICIAN: CECI HUYNH   TECHNIQUE: CT of the brain from the skull vertex to the skull base, without intravenous contrast   CT cervical spine from the craniocervical junction through the cervicothoracic junction  without IV contrast, including sagittal and coronal reformatted images   FINDINGS: CT BRAIN   TRAUMA-RELATED   Brain Injury (BIG) guidelines CT values:   Skull fracture: No SDH (subdural hematoma): None detected EDH (epidural hemtoma): None detected IPH (intraparenchymal hemorrhage): None detected SAH (subarachnoid hemorrhage): None detected IVH (intraventricular hemorrhage): None detected   Reference: Bert CAMPOS, John Paul RS, Madelin M, et al. The BIG (brain injury guidelines) project: defining the management of traumatic brain injury by acute care surgeons. J Trauma Acute Care Surg. 2014;76:267f828.   OTHER   ACUTE INTRACRANIAL MASS EFFECT:  Negative   CT EVIDENCE OF ACUTE / SUBACUTE TERRITORIAL ISCHEMIA:  Negative   VENTRICLES:  Normal caliber and configuration   OTHER BRAIN FINDINGS:  No additional findings to note   INCLUDED PARANASAL SINUSES: All clear   INCLUDED MASTOID AIR CELLS: All clear   SKULL:  No lytic or blastic lesion   EXTRACRANIAL SOFT TISSUES:  Large right paramidline scalp hematoma. The skin surface over the area of hematoma is cut out of the field of view. Field-of-view contains no subcutaneous gas or subcutaneous radiopaque foreign body   -------   CT CERVICAL SPINE   COUNTING REFERENCE: Craniocervical junction   CRANIOCERVICAL JUNCTION:  Intact   CERVICAL ALIGNMENT:  Anatomic; no acute traumatic alignment abnormality such as subluxation   ACUTE FRACTURE: Negative   AGGRESSIVE OSSEOUS LESION: Negative   BONY CANAL AND FORAMINA:  No significant interval change from prior. This exam was interpreted on a time sensitive basis in the context of trauma, not for radiculopathy as an outpatient   PARASPINAL SOFT TISSUES:  No large acute hematoma or other acute posttraumatic finding   OTHER INCLUDED STRUCTURES:  No acute or contributory soft tissue abnormality in the other cervical and upper thoracic soft tissues       LARGE SCALP HEMATOMA. SKIN SURFACE OVER THE SCALP HEMATOMA IS NOT INCLUDED IN THE FIELD OF  VIEW. FIELD-OF-VIEW DOES NOT INCLUDE ANY SUBCUTANEOUS GAS OR SUBCUTANEOUS RADIOPAQUE FOREIGN BODY   NO ACUTE INTRACRANIAL PROCESS. SKULL INTACT   NO ACUTE FRACTURE OR SUBLUXATION IN THE CERVICAL SPINE   THIS REPORT SERVES AS THE DIAGNOSTIC INTERPRETATION FOR TWO EXAMS PERFORMED CONCURRENTLY: CT BRAIN WITHOUT IV CONTRAST AND CT CERVICAL SPINE WITHOUT IV CONTRAST   MACRO: None   Signed by: Guerrero Zurita 4/23/2024 10:42 AM Dictation workstation:   [id number]    CT cervical spine wo IV contrast    Addendum Date: 4/24/2024    Interpreted By:  Guerrero Zurita, ADDENDUM: Especially with the benefit of subsequently performed MRI, review of the preceding CT shows interval new splaying of the posterior elements at C6-7; probably acute fragmentation of the lower tip of the left facet and near perching of facets also on the left. Focal kyphosis at same level. Cord impingement confirmed on subsequent MRI   Signed by: Guerrero Zurita 4/24/2024 3:00 PM   -------- ORIGINAL REPORT -------- Dictation workstation:   [id number]    Result Date: 4/24/2024  Interpreted By:  Guerrero Zurita, STUDY: CT HEAD WO IV CONTRAST; CT CERVICAL SPINE WO IV CONTRAST;  4/23/2024 10:20 am   INDICATION: Signs/Symptoms:Fall head injury.   COMPARISON: CT brain and cervical spine both from 28 December 2023   ACCESSION NUMBER(S): [id number]; [id number]   ORDERING CLINICIAN: CECI HUYNH   TECHNIQUE: CT of the brain from the skull vertex to the skull base, without intravenous contrast   CT cervical spine from the craniocervical junction through the cervicothoracic junction without IV contrast, including sagittal and coronal reformatted images   FINDINGS: CT BRAIN   TRAUMA-RELATED   Brain Injury (BIG) guidelines CT values:   Skull fracture: No SDH (subdural hematoma): None detected EDH (epidural hemtoma): None detected IPH (intraparenchymal hemorrhage): None detected SAH (subarachnoid hemorrhage): None detected IVH (intraventricular hemorrhage): None detected    Reference: Bert B, John Paul RS, Madelin M, et al. The BIG (brain injury guidelines) project: defining the management of traumatic brain injury by acute care surgeons. J Trauma Acute Care Surg. 2014;76:356d429.   OTHER   ACUTE INTRACRANIAL MASS EFFECT:  Negative   CT EVIDENCE OF ACUTE / SUBACUTE TERRITORIAL ISCHEMIA:  Negative   VENTRICLES:  Normal caliber and configuration   OTHER BRAIN FINDINGS:  No additional findings to note   INCLUDED PARANASAL SINUSES: All clear   INCLUDED MASTOID AIR CELLS: All clear   SKULL:  No lytic or blastic lesion   EXTRACRANIAL SOFT TISSUES:  Large right paramidline scalp hematoma. The skin surface over the area of hematoma is cut out of the field of view. Field-of-view contains no subcutaneous gas or subcutaneous radiopaque foreign body   -------   CT CERVICAL SPINE   COUNTING REFERENCE: Craniocervical junction   CRANIOCERVICAL JUNCTION:  Intact   CERVICAL ALIGNMENT:  Anatomic; no acute traumatic alignment abnormality such as subluxation   ACUTE FRACTURE: Negative   AGGRESSIVE OSSEOUS LESION: Negative   BONY CANAL AND FORAMINA:  No significant interval change from prior. This exam was interpreted on a time sensitive basis in the context of trauma, not for radiculopathy as an outpatient   PARASPINAL SOFT TISSUES:  No large acute hematoma or other acute posttraumatic finding   OTHER INCLUDED STRUCTURES:  No acute or contributory soft tissue abnormality in the other cervical and upper thoracic soft tissues       LARGE SCALP HEMATOMA. SKIN SURFACE OVER THE SCALP HEMATOMA IS NOT INCLUDED IN THE FIELD OF VIEW. FIELD-OF-VIEW DOES NOT INCLUDE ANY SUBCUTANEOUS GAS OR SUBCUTANEOUS RADIOPAQUE FOREIGN BODY   NO ACUTE INTRACRANIAL PROCESS. SKULL INTACT   NO ACUTE FRACTURE OR SUBLUXATION IN THE CERVICAL SPINE   THIS REPORT SERVES AS THE DIAGNOSTIC INTERPRETATION FOR TWO EXAMS PERFORMED CONCURRENTLY: CT BRAIN WITHOUT IV CONTRAST AND CT CERVICAL SPINE WITHOUT IV CONTRAST   MACRO: None   Signed by: Guerrero  Burtmarthakathirn 4/23/2024 10:42 AM Dictation workstation:   [id number]    MR cervical spine wo IV contrast    Result Date: 4/23/2024  Interpreted By:  Lisa James, STUDY: MR CERVICAL SPINE WO IV CONTRAST;  4/23/2024 7:47 pm   INDICATION: Signs/Symptoms:Neck pain. S/P fall down flight of steps.   COMPARISON: CT cervical spine 04/23/2024   ACCESSION NUMBER(S): [id number]   ORDERING CLINICIAN: ANA LAURA KEY   TECHNIQUE: Sagittal T1, T2, STIR, axial T1 and axial T2 weighted images were acquired through the cervical spine.   FINDINGS: There is destruction of the ligamentum flavum at the level of C6-C7 best identified on sagittal T2 imaging, with widening of the interspinous space and mild focal C6-C7 kyphosis. There is facet joint edema and slightly perched facets noted bilaterally. There is anterior buckling of the cortex of the anterior superior C7 vertebral body and minimal prevertebral edema noted. There is minimal irregularity of the posterior longitudinal ligament at C6-C7. Intervertebral disc space edema is noted. Findings results in severe canal stenosis with cord deformity at C6-C7. There is slightly edematous appearance of the cord superior and inferior to this level with mild cord T2/STIR hyperintense signal dorsal to the C7 vertebral body. There is a small dorsal epidural collection which extends from C6-C7 through T1-T2 measuring up to 2 mm (AP). There is associated diffuse paraspinal edema which extends predominately from the level of C6-C7 cranially to the suboccipital region   Mild superior endplate edema noted at T1 without significant vertebral body height loss. Vertebral body and facet joint alignment is otherwise maintained. Intervertebral disc spaces are otherwise maintained.   C1-C2: The cervicomedullary junction appears unremarkable. There is no central canal stenosis.   C2-C3: There is no significant central canal or neural foraminal stenosis.   C3-C4: Mild right foraminal narrowing, otherwise  there is no significant central canal or neural foraminal stenosis.   C4-C5: Minimal canal and right foraminal narrowing present. The left foramen is patent.   C5-C6: Mild canal and moderate to severe right foraminal narrowing. No significant left foraminal narrowing.   C6-C7: Canal stenosis an epidural collection as well as additional findings as detailed above.   C7-T1: For is epidural collection. There is no significant central canal or neural foraminal stenosis.       There is traumatic kyphosis at C6-C7 with persistence and disruption of the ligamentum flavum. There is irregularity of the C6-C7 posterior longitudinal ligament with buckling and impaction anteriorly of the anterior longitudinal ligament and C7 vertebral body. Findings result in severe canal stenosis with secondary cord edema and trace epidural hematoma. Correlation for two-column spinal injury also recommended. Neuro surgical consult recommended.   Endplate edema at T1 concerning for a nondisplaced superior endplate fracture.   Extensive paraspinal edema with probable strain/sprain and mild prevertebral edema.   MACRO: Lisa James discussed the significance and urgency of this critical finding by telephone with  ANA LAURA KEY on 4/23/2024 at 9:58 pm.  (**-RCF-**) Findings:  See findings.   Signed by: Lisa James 4/23/2024 10:02 PM Dictation workstation:   [id number]    CT chest abdomen pelvis w IV contrast    Result Date: 4/23/2024  STUDY: CT Abdomen, and Pelvis with IV Contrast, CT Thoracic Spine and Lumbar Spine without IV Contrast; 4/23/2024 10:23 AM INDICATION: Back pain post fall. COMPARISON: 53/3/2022 XR abdomen. ACCESSION NUMBER(S): [id number], [id number], [id number] ORDERING CLINICIAN: CECI HUYNH TECHNIQUE: CT of the abdomen and pelvis was performed.  Contiguous axial images were obtained at 3 mm slice thickness through the abdomen and pelvis. Coronal and sagittal reconstructions at 3 mm slice thickness were performed.   Omnipaque 350 65 mL was administered intravenously. Please note that spinal images were generated from the original CT abdomen and pelvis imaging. FINDINGS: CHEST: MEDIASTINUM: The heart is normal in size without pericardial effusion.  Central vascular structures opacify normally.  There is prominent fluid collection along the left aspect of the mediastinum measuring 14.4 x 9.8 x 3.8 cm suggesting large pericardial cyst. LUNGS/PLEURA: There is no pleural effusion or pneumothorax.  The airways are patent. There is mild atelectasis adjacent to the left pericardial fluid collection.  There is small cystic focus right middle lobe.  There are no pulmonary nodules or masses. LYMPH NODES: Thoracic lymph nodes are not enlarged. ABDOMEN:  LIVER: Small hypodensity adjacent to the gallbladder suggests hepatic cyst.  BILE DUCTS: No intrahepatic or extrahepatic biliary ductal dilatation.  GALLBLADDER: The gallbladder is present and demonstrates small dependent stones versus sludge. STOMACH: No abnormalities identified.  PANCREAS: No masses or ductal dilatation.  SPLEEN: No splenomegaly or focal splenic lesion.  ADRENAL GLANDS: No thickening or nodules.  KIDNEYS AND URETERS: Kidneys are normal in size and location.  No renal or ureteral calculi.  Tiny hypodensities within the kidney suggest renal cyst.  PELVIS:  BLADDER: There is a small amount of gas in the anterior aspect of the bladder. Please correlate for recent instrumentation.  REPRODUCTIVE ORGANS: No abnormalities identified.  BOWEL: No abnormalities identified.  The appendix is normal.  VESSELS: No abnormalities identified.  Abdominal aorta is normal in caliber.  PERITONEUM/RETROPERITONEUM/LYMPH NODES: No free fluid.  No pneumoperitoneum. No lymphadenopathy.  ABDOMINAL WALL: No abnormalities identified. SOFT TISSUES: No abnormalities identified.  BONES: No acute fracture or aggressive osseous lesion. THORACIC SPINE: The alignment is anatomic.  There is no fracture or  traumatic subluxation.  There is mild disc space narrowing.  There are small anterior marginal osteophytes in the lower thoracic spine extending T8-T10. The vertebral body heights are well maintained.  Disc spaces are preserved.  No significant central canal stenosis is demonstrated. The neural foramina are patent throughout.  The paravertebral soft tissues are within normal limits. LUMBAR SPINE: The alignment is anatomic.  There is no fracture or traumatic subluxation. There is superior endplate concavity and Schmorl's node deformity at L1 with mild anterior wedging which appears chronic.  There is disc space narrowing and mild endplate degenerative changes.  There is mild to moderate facet arthrosis. At L2-3, there is mild annular disc bulge and mild facet arthrosis. There is mild central canal stenosis with mild to moderate bilateral neural foraminal stenosis. At L3-4 there is broad-based disc bulge and moderate facet arthrosis. There is moderate to advanced central canal stenosis and advanced right with moderate to advanced left neural foraminal stenosis. At L4-5 there is broad-based disc bulge and central disc protrusion with facet arthrosis contributing to advanced high-grade central canal stenosis as well as advanced bilateral neural foraminal stenosis. At L5-S1 is broad-based disc bulge and central disc protrusion with facet arthrosis.  There is at least moderate central canal stenosis with moderate to advanced right and mild left neural foraminal stenosis. The paravertebral soft tissues are within normal limits.      Chest: No acute traumatic injury. Large cystic focus along the left aspect of the mediastinum suggesting pericardial cyst.  No other acute cardiopulmonary disease. Abdomen: No acute traumatic injury and no acute inflammatory changes. Gallstones versus sludge within the gallbladder. Pelvis: No acute traumatic injury or inflammatory changes. Small amount gas in the bladder.  Please correlate for  recent instrumentation. Thoracic spine: No acute fracture or malalignment. Lumbar spine: No acute fracture or malalignment.  Degenerative changes as described.  Findings are greater at L4-5.  Recommend follow-up MRI for further evaluation unless contraindicated. Signed by Saulo Castillo, DO    CT thoracic spine wo IV contrast    Result Date: 4/23/2024  STUDY: CT Abdomen, and Pelvis with IV Contrast, CT Thoracic Spine and Lumbar Spine without IV Contrast; 4/23/2024 10:23 AM INDICATION: Back pain post fall. COMPARISON: 53/3/2022 XR abdomen. ACCESSION NUMBER(S): [id number], [id number], [id number] ORDERING CLINICIAN: CECI HUYNH TECHNIQUE: CT of the abdomen and pelvis was performed.  Contiguous axial images were obtained at 3 mm slice thickness through the abdomen and pelvis. Coronal and sagittal reconstructions at 3 mm slice thickness were performed.  Omnipaque 350 65 mL was administered intravenously. Please note that spinal images were generated from the original CT abdomen and pelvis imaging. FINDINGS: CHEST: MEDIASTINUM: The heart is normal in size without pericardial effusion.  Central vascular structures opacify normally.  There is prominent fluid collection along the left aspect of the mediastinum measuring 14.4 x 9.8 x 3.8 cm suggesting large pericardial cyst. LUNGS/PLEURA: There is no pleural effusion or pneumothorax.  The airways are patent. There is mild atelectasis adjacent to the left pericardial fluid collection.  There is small cystic focus right middle lobe.  There are no pulmonary nodules or masses. LYMPH NODES: Thoracic lymph nodes are not enlarged. ABDOMEN:  LIVER: Small hypodensity adjacent to the gallbladder suggests hepatic cyst.  BILE DUCTS: No intrahepatic or extrahepatic biliary ductal dilatation.  GALLBLADDER: The gallbladder is present and demonstrates small dependent stones versus sludge. STOMACH: No abnormalities identified.  PANCREAS: No masses or ductal dilatation.  SPLEEN: No  splenomegaly or focal splenic lesion.  ADRENAL GLANDS: No thickening or nodules.  KIDNEYS AND URETERS: Kidneys are normal in size and location.  No renal or ureteral calculi.  Tiny hypodensities within the kidney suggest renal cyst.  PELVIS:  BLADDER: There is a small amount of gas in the anterior aspect of the bladder. Please correlate for recent instrumentation.  REPRODUCTIVE ORGANS: No abnormalities identified.  BOWEL: No abnormalities identified.  The appendix is normal.  VESSELS: No abnormalities identified.  Abdominal aorta is normal in caliber.  PERITONEUM/RETROPERITONEUM/LYMPH NODES: No free fluid.  No pneumoperitoneum. No lymphadenopathy.  ABDOMINAL WALL: No abnormalities identified. SOFT TISSUES: No abnormalities identified.  BONES: No acute fracture or aggressive osseous lesion. THORACIC SPINE: The alignment is anatomic.  There is no fracture or traumatic subluxation.  There is mild disc space narrowing.  There are small anterior marginal osteophytes in the lower thoracic spine extending T8-T10. The vertebral body heights are well maintained.  Disc spaces are preserved.  No significant central canal stenosis is demonstrated. The neural foramina are patent throughout.  The paravertebral soft tissues are within normal limits. LUMBAR SPINE: The alignment is anatomic.  There is no fracture or traumatic subluxation. There is superior endplate concavity and Schmorl's node deformity at L1 with mild anterior wedging which appears chronic.  There is disc space narrowing and mild endplate degenerative changes.  There is mild to moderate facet arthrosis. At L2-3, there is mild annular disc bulge and mild facet arthrosis. There is mild central canal stenosis with mild to moderate bilateral neural foraminal stenosis. At L3-4 there is broad-based disc bulge and moderate facet arthrosis. There is moderate to advanced central canal stenosis and advanced right with moderate to advanced left neural foraminal stenosis. At  L4-5 there is broad-based disc bulge and central disc protrusion with facet arthrosis contributing to advanced high-grade central canal stenosis as well as advanced bilateral neural foraminal stenosis. At L5-S1 is broad-based disc bulge and central disc protrusion with facet arthrosis.  There is at least moderate central canal stenosis with moderate to advanced right and mild left neural foraminal stenosis. The paravertebral soft tissues are within normal limits.      Chest: No acute traumatic injury. Large cystic focus along the left aspect of the mediastinum suggesting pericardial cyst.  No other acute cardiopulmonary disease. Abdomen: No acute traumatic injury and no acute inflammatory changes. Gallstones versus sludge within the gallbladder. Pelvis: No acute traumatic injury or inflammatory changes. Small amount gas in the bladder.  Please correlate for recent instrumentation. Thoracic spine: No acute fracture or malalignment. Lumbar spine: No acute fracture or malalignment.  Degenerative changes as described.  Findings are greater at L4-5.  Recommend follow-up MRI for further evaluation unless contraindicated. Signed by Saulo Castillo, DO    CT lumbar spine wo IV contrast    Result Date: 4/23/2024  STUDY: CT Abdomen, and Pelvis with IV Contrast, CT Thoracic Spine and Lumbar Spine without IV Contrast; 4/23/2024 10:23 AM INDICATION: Back pain post fall. COMPARISON: 53/3/2022 XR abdomen. ACCESSION NUMBER(S): [id number], [id number], [id number] ORDERING CLINICIAN: CECI HUYNH TECHNIQUE: CT of the abdomen and pelvis was performed.  Contiguous axial images were obtained at 3 mm slice thickness through the abdomen and pelvis. Coronal and sagittal reconstructions at 3 mm slice thickness were performed.  Omnipaque 350 65 mL was administered intravenously. Please note that spinal images were generated from the original CT abdomen and pelvis imaging. FINDINGS: CHEST: MEDIASTINUM: The heart is normal in size  without pericardial effusion.  Central vascular structures opacify normally.  There is prominent fluid collection along the left aspect of the mediastinum measuring 14.4 x 9.8 x 3.8 cm suggesting large pericardial cyst. LUNGS/PLEURA: There is no pleural effusion or pneumothorax.  The airways are patent. There is mild atelectasis adjacent to the left pericardial fluid collection.  There is small cystic focus right middle lobe.  There are no pulmonary nodules or masses. LYMPH NODES: Thoracic lymph nodes are not enlarged. ABDOMEN:  LIVER: Small hypodensity adjacent to the gallbladder suggests hepatic cyst.  BILE DUCTS: No intrahepatic or extrahepatic biliary ductal dilatation.  GALLBLADDER: The gallbladder is present and demonstrates small dependent stones versus sludge. STOMACH: No abnormalities identified.  PANCREAS: No masses or ductal dilatation.  SPLEEN: No splenomegaly or focal splenic lesion.  ADRENAL GLANDS: No thickening or nodules.  KIDNEYS AND URETERS: Kidneys are normal in size and location.  No renal or ureteral calculi.  Tiny hypodensities within the kidney suggest renal cyst.  PELVIS:  BLADDER: There is a small amount of gas in the anterior aspect of the bladder. Please correlate for recent instrumentation.  REPRODUCTIVE ORGANS: No abnormalities identified.  BOWEL: No abnormalities identified.  The appendix is normal.  VESSELS: No abnormalities identified.  Abdominal aorta is normal in caliber.  PERITONEUM/RETROPERITONEUM/LYMPH NODES: No free fluid.  No pneumoperitoneum. No lymphadenopathy.  ABDOMINAL WALL: No abnormalities identified. SOFT TISSUES: No abnormalities identified.  BONES: No acute fracture or aggressive osseous lesion. THORACIC SPINE: The alignment is anatomic.  There is no fracture or traumatic subluxation.  There is mild disc space narrowing.  There are small anterior marginal osteophytes in the lower thoracic spine extending T8-T10. The vertebral body heights are well maintained.  Disc  spaces are preserved.  No significant central canal stenosis is demonstrated. The neural foramina are patent throughout.  The paravertebral soft tissues are within normal limits. LUMBAR SPINE: The alignment is anatomic.  There is no fracture or traumatic subluxation. There is superior endplate concavity and Schmorl's node deformity at L1 with mild anterior wedging which appears chronic.  There is disc space narrowing and mild endplate degenerative changes.  There is mild to moderate facet arthrosis. At L2-3, there is mild annular disc bulge and mild facet arthrosis. There is mild central canal stenosis with mild to moderate bilateral neural foraminal stenosis. At L3-4 there is broad-based disc bulge and moderate facet arthrosis. There is moderate to advanced central canal stenosis and advanced right with moderate to advanced left neural foraminal stenosis. At L4-5 there is broad-based disc bulge and central disc protrusion with facet arthrosis contributing to advanced high-grade central canal stenosis as well as advanced bilateral neural foraminal stenosis. At L5-S1 is broad-based disc bulge and central disc protrusion with facet arthrosis.  There is at least moderate central canal stenosis with moderate to advanced right and mild left neural foraminal stenosis. The paravertebral soft tissues are within normal limits.      Chest: No acute traumatic injury. Large cystic focus along the left aspect of the mediastinum suggesting pericardial cyst.  No other acute cardiopulmonary disease. Abdomen: No acute traumatic injury and no acute inflammatory changes. Gallstones versus sludge within the gallbladder. Pelvis: No acute traumatic injury or inflammatory changes. Small amount gas in the bladder.  Please correlate for recent instrumentation. Thoracic spine: No acute fracture or malalignment. Lumbar spine: No acute fracture or malalignment.  Degenerative changes as described.  Findings are greater at L4-5.  Recommend  follow-up MRI for further evaluation unless contraindicated. Signed by Saulo Castillo DO    ECG 12 lead    Result Date: 4/23/2024  Normal sinus rhythm Right bundle branch block Abnormal ECG When compared with ECG of 01-MAY-2022 01:32, QRS voltage has increased Nonspecific T wave abnormality no longer evident in Inferior leads T wave amplitude has increased in Anterolateral leads    XR elbow right 3+ views    Result Date: 4/23/2024  STUDY: Elbow Radiographs; 4/23/2024 9:25 AM. INDICATION: Fall injury. COMPARISON: None Available. ACCESSION NUMBER(S): [id number] ORDERING CLINICIAN: CECI HUYNH TECHNIQUE:  Five views of the right elbow. FINDINGS:  There is no displaced fracture.  Underlying osteopenia.  Mild to moderate degenerative overhanging osteophytes along the radial head. Changes noted.  The alignment is anatomic.  No soft tissue abnormality is seen.  There is no joint effusion.    No acute osseous abnormality. Signed by Teo Ponce MD                Assessment/Plan      Principal Problem:    Rhabdomyolysis  Active Problems:    ETOH abuse    Fall    Closed head injury    Elevated lactic acid level    Neck pain    Alcohol withdrawal seizure, with unspecified complication (Multi)    Closed nondisplaced fracture of sixth cervical vertebra (Multi)    Encephalopathy- Unclear etiology  Alcohol withdrawal- currently resolved (medications discontinued by psych); remains lethargic.  MRI brain unremarkable.  Continue supportive care.  Ensure pt remains awake during the day and sleeps at night.  Psychiatry is following, see recs.    Case/plan discussed and pt seen with Dr. Casillas.         I spent  minutes in the professional and overall care of this patient.      Diane Ramirez, APRN-CNP  No evidence of an acute focal CNS pathology by history, physical exam, and brain images.   I agree with medical follow up, to discontinue future ETOH use, PT/OT evaluation once more awake.     Kenna Casillas M.D.

## 2024-04-30 NOTE — CONSULTS
Infectious Disease Consult    PATIENT NAME: Katya Sandhu    MRN: [id number]  SERVICE DATE:  4/30/2024   SERVICE TIME:  2:04 PM    SIGNATURE: Titus Aly MD    PRIMARY CARE PHYSICIAN: Josselin Abdi MD  REASON FOR CONSULT: Pneumonia  REQUESTING PHYSICIAN: Dr.Zraik REHMAN  74-year-old female with past medical history as listed below who was admitted initially status post fall found to have rhabdomyolysis.  The patient had multiple closed nondisplaced fracture of the cervical vertebra, I was consulted with a concern for pneumonia, chest x-ray was done because of leukocytosis, no hypoxia no fever.  The patient was started on Zosyn    PAST MEDICAL HISTORY:   Past Medical History:   Diagnosis Date    Brain lesion     Depression     ETOH abuse     HLD (hyperlipidemia)     Hypertension     Hypothyroidism      PAST SURGICAL HISTORY:   Past Surgical History:   Procedure Laterality Date    BRAIN SURGERY      CT ANGIO NECK  04/24/2022    CT NECK ANGIO W AND WO IV CONTRAST 4/24/2022 [facility]    CT HEAD ANGIO W AND WO IV CONTRAST  04/24/2022    CT HEAD ANGIO W AND WO IV CONTRAST 4/24/2022 [facility] CLINICAL [facility]GAC[facility]    FOOT SURGERY      TONSILLECTOMY       FAMILY HISTORY:   Family History   Problem Relation Name Age of Onset    Other (HTN) Mother      Hypertension Father      Alzheimer's disease Father      Aneurysm Father      Hypertension Sister      Hypertension Brother       SOCIAL HISTORY:   Social History     Tobacco Use    Smoking status: Former     Types: Cigarettes    Smokeless tobacco: Never   Substance Use Topics    Alcohol use: Yes     Alcohol/week: 3.0 standard drinks of alcohol     Types: 3 Glasses of wine per week     Comment: 1 glass of vodka sometines    Drug use: Never     CURRENT ALLERGIES:   Allergies as of 04/23/2024 - Reviewed 04/23/2024   Allergen Reaction Noted    Penicillins Hives, Itching, and Rash 12/29/2004     MEDICATIONS:    Current Facility-Administered Medications:      acetaminophen (Tylenol) tablet 650 mg, 650 mg, oral, q4h PRN, Jessica Lepe PA-C, 650 mg at 04/29/24 0437    dextrose 50 % injection 12.5 g, 12.5 g, intravenous, q15 min PRN, Ron Palm MD    dextrose 50 % injection 25 g, 25 g, intravenous, q15 min PRN, Ron Palm MD    folic acid (Folvite) tablet 1 mg, 1 mg, oral, Daily, Ron Palm MD, 1 mg at 04/30/24 1002    glucagon (Glucagen) injection 1 mg, 1 mg, intramuscular, q15 min PRN, Ron Palm MD    glucagon (Glucagen) injection 1 mg, 1 mg, intramuscular, q15 min PRN, Ron Palm MD    haloperidol lactate (Haldol) injection 2 mg, 2 mg, intramuscular, q6h PRN, Yury Mohamud MD    heparin (porcine) injection 5,000 Units, 5,000 Units, subcutaneous, q8h, Ron Palm MD, 5,000 Units at 04/30/24 1001    lactated Ringer's infusion, 75 mL/hr, intravenous, Continuous, Keisha Logan, APRN-CNP, Last Rate: 75 mL/hr at 04/30/24 1235, 75 mL/hr at 04/30/24 1235    melatonin tablet 5 mg, 5 mg, oral, Nightly PRN, Yury Mohamud MD    multivitamin with minerals 1 tablet, 1 tablet, oral, Daily, Ron Palm MD, 1 tablet at 04/30/24 1001    naloxone (Narcan) injection 0.2 mg, 0.2 mg, intravenous, q5 min PRN, Ron Palm MD    ondansetron ODT (Zofran-ODT) disintegrating tablet 4 mg, 4 mg, oral, q8h PRN **OR** ondansetron (Zofran) injection 4 mg, 4 mg, intravenous, q8h PRN, Ron Palm MD    oxyCODONE (Roxicodone) immediate release tablet 5 mg, 5 mg, oral, q6h PRN, Paula Muhammad, APRN-CNP    polyethylene glycol (Glycolax, Miralax) packet 17 g, 17 g, oral, Daily, Ron Palm MD, 17 g at 04/30/24 1002    zonisamide (Zonegran) capsule 200 mg, 200 mg, oral, Daily, Ron Palm MD, 200 mg at 04/30/24 1002       COMPLETE REVIEW OF SYSTEMS:    Review of systems shows no findings other than what is described in the narrative above.  Specifically, the patient has had no significant changes in eyesight, no sore throat, no post nasal drip, no ear pains.  There has  "been no cough or chest pains, pleuritic or otherwise.  There has been no abdominal pain, no nausea or vomiting, nor diarrhea.   There has been no dysuria, urinary frequency, or flank pain.  There is nothing unusual in the joints or muscles, or any skin rashes or skin swellings or abscesses noted.   All other systems were reviewed and are negative.        PHYSICAL EXAM:  Patient Vitals for the past 24 hrs:   BP Temp Temp src Pulse Resp SpO2   04/30/24 1200 126/77 37.1 °C (98.8 °F) Temporal 84 17 93 %   04/30/24 0800 118/76 36.8 °C (98.2 °F) Temporal 82 15 93 %   04/30/24 0400 135/80 36.5 °C (97.7 °F) Temporal 82 16 98 %   04/29/24 2000 113/67 37.1 °C (98.8 °F) Temporal 90 18 98 %   04/29/24 1600 130/76 36 °C (96.8 °F) -- 85 18 94 %     Body mass index is 20.14 kg/m².  Gen: NAD  Neck: symmetric, no mass  Cardiovascular: RRR  Respiratory: No distress   GI: Abd soft, nontender, non-distended  Extremities: no leg edema  Skin: Warm and dry.  Neuro: alert and oriented times 3  : no enriquez     Labs:  Lab Results   Component Value Date    WBC 11.4 (H) 04/30/2024    HGB 12.1 04/30/2024    HCT 36.6 04/30/2024     (H) 04/30/2024     04/30/2024     Lab Results   Component Value Date    GLUCOSE 93 04/30/2024    CALCIUM 8.7 04/30/2024     04/30/2024    K 3.8 04/30/2024    CO2 18 (L) 04/30/2024     04/30/2024    BUN 11 04/30/2024    CREATININE 0.55 04/30/2024   ESR: --No results found for: \"SEDRATE\"No results found for: \"CRP\"  Lab Results   Component Value Date    ALT 33 04/23/2024    AST 46 (H) 04/23/2024    ALKPHOS 70 04/23/2024    BILITOT 0.8 04/23/2024       DATA:   Diagnostic tests reviewed for today's visit:    Labs this admission reviewed  Imagings this admission reviewed  Cultures: Reviewed        ASSESSMENT :   74-year-old female with past medical history of alcohol abuse who presented status post fall found to have rhabdomyolysis, ID team was consulted with a concern of pneumonia.  Chest x-ray " "was done due to leukocytosis and showed left lower lobe consolidation versus atelectasis, no hypoxia or fever.      PLAN:  Less likely bacterial pneumonia.  Can discontinue antibiotics.    Will continue to follow     Thank you so much for this consultation         Titus Aly MD.   Infectious Disease Attending        This note was partially created using voice recognition software and is inherently subject to errors including those of syntax and \"sound-alike\" substitutions which may escape proofreading. In such instances, original meaning may be extrapolated by contextual derivation       "

## 2024-04-30 NOTE — CARE PLAN
Pt had an uneventful night. No acute changes was noted. Pt CIWA remains unchanged. Pt was turned Q2 this shift. Pt safety been maintained.

## 2024-04-30 NOTE — PROCEDURES
Speech-Language Pathology    SLP Adult Inpatient MBSS/FEES Evaluation    Patient Name: Katya Sandhu  MRN: [id number]  Today's Date: 4/30/2024   Time Calculation  Start Time: 0905  Stop Time: 0920  Time Calculation (min): 15 min    Current Problem:   1. Rhabdomyolysis        2. Closed head injury, initial encounter        3. Closed nondisplaced fracture of sixth cervical vertebra, unspecified fracture morphology, initial encounter (Multi)  Case Request Operating Room: C6-7 anterior cervical discectomy and fusion, reduction of fracture    Case Request Operating Room: C6-7 anterior cervical discectomy and fusion, reduction of fracture      4. Neck pain  acetaminophen (Tylenol) 325 mg tablet      5. Acute postoperative pain  acetaminophen (Tylenol) 325 mg tablet      6. Oropharyngeal dysphagia          Modified Barium Swallow Study completed after informed verbal consent. Trials of thin, nectar/mildly thick liquid, puree and regular solids were given.     Paula Kimbrough MA, CCC-SLP  Phone/Pager: Epic/Haiku secure chat    SPEECH FINDINGS:   Reason for referral: Suspected oropharyngeal dysphagia during the clinical swallow evaluation.  Patient hx: s/p ACDF C6-7 on 4/24; dementia, depression, hypertension, brain lesion, hyperlipidemia, hypothyroidism, alcohol withdrawal seizure, EtOH, and recurrent falls   Respiratory status: WFL  Current diet: Regular/thin    FINAL SPEECH RECOMMENDATIONS  DIET: Regular/thin  STRATEGIES:   -Sit upright 90 degrees for all PO  -Remain upright 20-30 minutes after meals  -Feed/eat at a slow rate  -Small bite/sip  -Swallow 2-3 times per bite/sip  -Alternate liquids/solids  -Medications crushed in puree  -Diligent and regular oral Care     Plan:  Treatment/Interventions: Pharyngeal exercises, Oral motor exercises, Patient/family education, Bolus trials. Diet tolerance/advancement  SLP Plan: Skilled SLP warranted  SLP Frequency: 1-2 follow up sessions  Duration:    Discussed POC:  Patient  Discussed Risks/Benefits: Yes  Patient/Caregiver Agreeable: Yes    Goals (established 4/29/24):  1. Patient will tolerate baseline diet absent of overt s/s of aspiration in 90% of observed therapeutic trials.  Status: Progressing, continue with goal    2. Patient will implement safe swallowing strategies to reduce risk of aspiration in 90% of trials given caregiver assistance/cueing as needed.  Status: Progressing, continue with goal    3. Patient will complete modified barium swallow study (MBSS) for objective assessment of oropharyngeal swallow function, to assess for aspiration, and to guide further recommendations and treatment plan. GOAL MET    Long term goal: Maintain adequate nutrition and hydration with the least restrictive oral diet with no overt s/s of aspiration or pulmonary compromise.    Education provided: ST provided education to Patient regarding MBSS impressions, recommendations and POC at this time. Verbal understanding and agreement given on all accounts.     Additional consult suggested: No    Repeat study/ dc plan: No    Mechanics of the swallow summary:  ORAL PHASE:  Bolus prep/mastication - Slow prolonged mastication with complete re-collection necessary   Bolus transport/lingual motion - Slowed Tongue Motion for A-P movement of the bolus     PHARYNGEAL PHASE:  Initiation of pharyngeal swallow - Bolus head at vallecular pit   Laryngeal elevation - Partial superior movement of thyroid cartilage and/or partial approximation of arytenoids to epiglottic petiole   Anterior hyoid excursion - No anterior movement   Epiglottic movement - Partial inversion, inconsistently  Laryngeal vestibule closure - Incomplete - narrow column of air/contrast in laryngeal vestibule   Pharyngeal stripping wave - Present, however, diminished   Pharyngoesophageal segment opening - Partial distension/partial duration with partial obstruction of flow of bolus   Tongue base retraction - Trace column of contrast  or air between tongue base and pharyngeal wall   Pharyngeal residue - Collection of residue within or on the pharyngeal structures     ESOPHAGEAL PHASE:  Esophageal clearance - Not evaluated     SLP Impressions with severity rating:   Pt presents with mild oropharyngeal dysphagia characterized by deficits noted above. Most significant deficits impacting swallowing safety and efficiency include partial distention and duration of the PES, incomplete laryngeal closure and diminished stripping wave. Transient penetration observed with thin liquids across all trials. Collection of residues on or within the laryngeal structures increased proportional to the viscosity of the bolus. Trace-mild pharyngeal residues with thin. Mild vallecular residues and trace pyriform sinus residues observed with mildly thick (nectar). Collection of puree residues remained in the vallecula despite multiple spontaneous swallows. Majority of the solid bolus remained in the pharynx after the initial swallow. Subsequent swallow partially cleared residues to moderate. A liquid wash following the solid trial did not significantly clear residues. No aspiration was observed with any trial. Based on the results of the study it is recommended the patient continue with the current diet and aspiration precautions above.     Functional Oral Intake Scale  Functional Oral Intake Scale: Level 7        total oral diet with no restrictions    Rosenbek's Penetration Aspiration Scale  Thin Liquids: 2. PENETRATION that CLEARS - contrast enter airway, above vocal cords, no residue  Nectar Thick Liquids: 1. NO ASPIRATION & NO PENETRATION - no aspiration, contrast does not enter airway  Puree: 1. NO ASPIRATION & NO PENETRATION - no aspiration, contrast does not enter airway  Solids: 1. NO ASPIRATION & NO PENETRATION - no aspiration, contrast does not enter airway

## 2024-04-30 NOTE — PROGRESS NOTES
Occupational Therapy    OT Treatment    Patient Name: Katya Sandhu  MRN: [id number]  Today's Date: 4/30/2024  Time Calculation  Start Time: 1010  Stop Time: 1107  Time Calculation (min): 57 min         Assessment:  End of Session Communication: Bedside nurse  End of Session Patient Position: Alarm on, Up in chair       Plan:  OT Frequency: Daily  OT Discharge Recommendations: Moderate intensity level of continued care     Subjective        04/30/24 1010   OT Last Visit   OT Received On 04/30/24   General   Prior to Session Communication Bedside nurse   Patient Position Received Bed, 3 rail up;Alarm on   Preferred Learning Style verbal;visual   General Comment Pt in bed upon arrival, pt fatigued and confused, max cues for participation, RN cleared for therapy.   Grooming   Grooming Level of Assistance Minimum assistance   Grooming Where Assessed Chair   Grooming Comments Pt performed grooming sitting edge of chair, mod cues for initiation to task.   UE Bathing   UE Bathing Level of Assistance Moderate assistance   UE Bathing Where Assessed Bed level   UE Bathing Comments Pt incontinent in bed and bath given, Mod A for UE bathing, max cues   LE Bathing   LE Bathing Level of Assistance Maximum assistance   LE Bathing Where Assessed Edge of bed   UE Dressing   UE Dressing Level of Assistance Moderate assistance   UE Dressing Where Assessed Edge of bed   UE Dressing Comments doff soiled and don clean gown   Toileting   Toileting Level of Assistance Dependent   Where Assessed   (Pt incontinent, Dep A for incontinence and keesha care, purewick placed however encouraged pt to utilize BSC.)   Bed Mobility   Bed Mobility Yes   Bed Mobility 1   Bed Mobility 1 Sitting to supine   Level of Assistance 1 Maximum assistance   Transfers   Transfer Yes   Transfer 1   Transfer From 1 Sit to   Transfer to 1 Stand   Technique 1 Sit to stand;Stand to sit   Transfer Device 1 Gait belt   Transfer Level of Assistance 1 Moderate  assistance;Maximum assistance   Trials/Comments 1 Pt initially Max A for sit<>stand however with multiple trials improved to Mod A.   Transfers 2   Transfer From 2 Bed to   Transfer to 2 Chair with arms   Technique 2 Stand pivot   Transfer Device 2 Gait belt   Transfer Level of Assistance 2 Maximum assistance, max cues   IP OT Assessment   End of Session Communication Bedside nurse   End of Session Patient Position Alarm on;Up in chair   Inpatient Plan   OT Frequency Daily   OT Discharge Recommendations Moderate intensity level of continued care       Outcome Measures:[facility] Daily Activity  Putting on and taking off regular lower body clothing: A lot  Bathing (including washing, rinsing, drying): A lot  Putting on and taking off regular upper body clothing: A lot  Toileting, which includes using toilet, bedpan or urinal: A lot  Taking care of personal grooming such as brushing teeth: A lot  Eating Meals: A lot  Daily Activity - Total Score: 12  Education Documentation  Body Mechanics, taught by ELPIDIO Bueno at 4/30/2024  2:53 PM.  Learner: Patient  Readiness: Acceptance  Method: Demonstration  Response: Needs Reinforcement    Precautions, taught by ELPIDIO Bueno at 4/30/2024  2:53 PM.  Learner: Patient  Readiness: Acceptance  Method: Demonstration  Response: Needs Reinforcement    ADL Training, taught by ELPIDIO Bueno at 4/30/2024  2:53 PM.  Learner: Patient  Readiness: Acceptance  Method: Demonstration  Response: Needs Reinforcement    Body Mechanics, taught by ELPIDIO Bueno at 4/29/2024  3:42 PM.  Learner: Patient  Readiness: Acceptance  Method: Demonstration, Explanation  Response: Needs Reinforcement    Precautions, taught by ELPIDIO Bueno at 4/29/2024  3:42 PM.  Learner: Patient  Readiness: Acceptance  Method: Demonstration, Explanation  Response: Needs Reinforcement    ADL Training, taught by ELPIDIO Bueno at 4/29/2024  3:42  PM.  Learner: Patient  Readiness: Acceptance  Method: Demonstration, Explanation  Response: Needs Reinforcement    Education Comments  No comments found.            Goals:  Encounter Problems       Encounter Problems (Active)       Dressings Lower Extremities       STG - Patient to complete lower body dressing SUP (Progressing)       Start:  04/25/24    Expected End:  05/01/24               Grooming       STG - Patient completes grooming SUP (Progressing)       Start:  04/25/24    Expected End:  05/01/24            STG - Patient will tolerate standing 2-4min (Progressing)       Start:  04/25/24    Expected End:  05/01/24               OT Transfers       STG - Patient will perform toilet transfer min assist (Progressing)       Start:  04/25/24    Expected End:  05/01/24

## 2024-05-01 VITALS
HEIGHT: 65 IN | DIASTOLIC BLOOD PRESSURE: 79 MMHG | HEART RATE: 96 BPM | TEMPERATURE: 97.5 F | RESPIRATION RATE: 18 BRPM | OXYGEN SATURATION: 96 % | WEIGHT: 121.03 LBS | BODY MASS INDEX: 20.17 KG/M2 | SYSTOLIC BLOOD PRESSURE: 130 MMHG

## 2024-05-01 LAB
ANION GAP SERPL CALC-SCNC: 14 MMOL/L (ref 10–20)
BUN SERPL-MCNC: 9 MG/DL (ref 6–23)
CALCIUM SERPL-MCNC: 9.4 MG/DL (ref 8.6–10.3)
CHLORIDE SERPL-SCNC: 107 MMOL/L (ref 98–107)
CO2 SERPL-SCNC: 22 MMOL/L (ref 21–32)
CREAT SERPL-MCNC: 0.58 MG/DL (ref 0.5–1.05)
EGFRCR SERPLBLD CKD-EPI 2021: >90 ML/MIN/1.73M*2
ERYTHROCYTE [DISTWIDTH] IN BLOOD BY AUTOMATED COUNT: 13.3 % (ref 11.5–14.5)
GLUCOSE SERPL-MCNC: 103 MG/DL (ref 74–99)
HCT VFR BLD AUTO: 37.4 % (ref 36–46)
HGB BLD-MCNC: 12.4 G/DL (ref 12–16)
MCH RBC QN AUTO: 32.9 PG (ref 26–34)
MCHC RBC AUTO-ENTMCNC: 33.2 G/DL (ref 32–36)
MCV RBC AUTO: 99 FL (ref 80–100)
NRBC BLD-RTO: 0 /100 WBCS (ref 0–0)
PLATELET # BLD AUTO: 302 X10*3/UL (ref 150–450)
POTASSIUM SERPL-SCNC: 3.9 MMOL/L (ref 3.5–5.3)
RBC # BLD AUTO: 3.77 X10*6/UL (ref 4–5.2)
SODIUM SERPL-SCNC: 139 MMOL/L (ref 136–145)
WBC # BLD AUTO: 10.1 X10*3/UL (ref 4.4–11.3)

## 2024-05-01 PROCEDURE — 92526 ORAL FUNCTION THERAPY: CPT | Mod: GN | Performed by: SPEECH-LANGUAGE PATHOLOGIST

## 2024-05-01 PROCEDURE — 97535 SELF CARE MNGMENT TRAINING: CPT | Mod: GO,CO

## 2024-05-01 PROCEDURE — 2500000004 HC RX 250 GENERAL PHARMACY W/ HCPCS (ALT 636 FOR OP/ED): Performed by: STUDENT IN AN ORGANIZED HEALTH CARE EDUCATION/TRAINING PROGRAM

## 2024-05-01 PROCEDURE — 2500000001 HC RX 250 WO HCPCS SELF ADMINISTERED DRUGS (ALT 637 FOR MEDICARE OP): Performed by: NURSE PRACTITIONER

## 2024-05-01 PROCEDURE — 2500000006 HC RX 250 W HCPCS SELF ADMINISTERED DRUGS (ALT 637 FOR ALL PAYERS): Performed by: STUDENT IN AN ORGANIZED HEALTH CARE EDUCATION/TRAINING PROGRAM

## 2024-05-01 PROCEDURE — 2500000001 HC RX 250 WO HCPCS SELF ADMINISTERED DRUGS (ALT 637 FOR MEDICARE OP): Performed by: STUDENT IN AN ORGANIZED HEALTH CARE EDUCATION/TRAINING PROGRAM

## 2024-05-01 PROCEDURE — 85027 COMPLETE CBC AUTOMATED: CPT | Performed by: NURSE PRACTITIONER

## 2024-05-01 PROCEDURE — 82374 ASSAY BLOOD CARBON DIOXIDE: CPT | Performed by: NURSE PRACTITIONER

## 2024-05-01 PROCEDURE — 36415 COLL VENOUS BLD VENIPUNCTURE: CPT | Performed by: NURSE PRACTITIONER

## 2024-05-01 RX ADMIN — POLYETHYLENE GLYCOL 3350 17 G: 17 POWDER, FOR SOLUTION ORAL at 08:16

## 2024-05-01 RX ADMIN — HEPARIN SODIUM 5000 UNITS: 5000 INJECTION INTRAVENOUS; SUBCUTANEOUS at 08:15

## 2024-05-01 RX ADMIN — ZONISAMIDE 200 MG: 100 CAPSULE ORAL at 08:15

## 2024-05-01 RX ADMIN — Medication 1 TABLET: at 08:15

## 2024-05-01 RX ADMIN — FOLIC ACID 1 MG: 1 TABLET ORAL at 08:15

## 2024-05-01 RX ADMIN — OXYCODONE HYDROCHLORIDE 5 MG: 5 TABLET ORAL at 02:50

## 2024-05-01 ASSESSMENT — COGNITIVE AND FUNCTIONAL STATUS - GENERAL
HELP NEEDED FOR BATHING: A LOT
PERSONAL GROOMING: A LOT
PERSONAL GROOMING: A LOT
MOBILITY SCORE: 10
DRESSING REGULAR UPPER BODY CLOTHING: A LOT
MOVING FROM LYING ON BACK TO SITTING ON SIDE OF FLAT BED WITH BEDRAILS: A LOT
DRESSING REGULAR LOWER BODY CLOTHING: A LOT
EATING MEALS: A LOT
DRESSING REGULAR UPPER BODY CLOTHING: A LOT
DAILY ACTIVITIY SCORE: 12
STANDING UP FROM CHAIR USING ARMS: A LOT
EATING MEALS: A LOT
WALKING IN HOSPITAL ROOM: TOTAL
CLIMB 3 TO 5 STEPS WITH RAILING: TOTAL
MOVING TO AND FROM BED TO CHAIR: A LOT
HELP NEEDED FOR BATHING: A LOT
TOILETING: A LOT
DAILY ACTIVITIY SCORE: 12
DRESSING REGULAR LOWER BODY CLOTHING: A LOT
TURNING FROM BACK TO SIDE WHILE IN FLAT BAD: A LOT
TOILETING: A LOT

## 2024-05-01 ASSESSMENT — LIFESTYLE VARIABLES
ORIENTATION AND CLOUDING OF SENSORIUM: ORIENTED AND CAN DO SERIAL ADDITIONS
TOTAL SCORE: 0
TREMOR: NO TREMOR
ANXIETY: NO ANXIETY, AT EASE
NAUSEA AND VOMITING: NO NAUSEA AND NO VOMITING
TOTAL SCORE: 0
VISUAL DISTURBANCES: NOT PRESENT
AGITATION: NORMAL ACTIVITY
PAROXYSMAL SWEATS: NO SWEAT VISIBLE
HEADACHE, FULLNESS IN HEAD: NOT PRESENT
TREMOR: NO TREMOR
PAROXYSMAL SWEATS: NO SWEAT VISIBLE
HEADACHE, FULLNESS IN HEAD: NOT PRESENT
VISUAL DISTURBANCES: NOT PRESENT
AUDITORY DISTURBANCES: NOT PRESENT
ANXIETY: NO ANXIETY, AT EASE
AUDITORY DISTURBANCES: NOT PRESENT
AGITATION: NORMAL ACTIVITY
ORIENTATION AND CLOUDING OF SENSORIUM: ORIENTED AND CAN DO SERIAL ADDITIONS
NAUSEA AND VOMITING: NO NAUSEA AND NO VOMITING

## 2024-05-01 ASSESSMENT — PAIN SCALES - GENERAL
PAINLEVEL_OUTOF10: 6
PAINLEVEL_OUTOF10: 10 - WORST POSSIBLE PAIN
PAINLEVEL_OUTOF10: 0 - NO PAIN

## 2024-05-01 ASSESSMENT — PAIN - FUNCTIONAL ASSESSMENT
PAIN_FUNCTIONAL_ASSESSMENT: 0-10
PAIN_FUNCTIONAL_ASSESSMENT: 0-10

## 2024-05-01 ASSESSMENT — ACTIVITIES OF DAILY LIVING (ADL)
BATHING_LEVEL_OF_ASSISTANCE: MAXIMUM ASSISTANCE
HOME_MANAGEMENT_TIME_ENTRY: 39
BATHING_WHERE_ASSESSED: EDGE OF BED
BATHING_COMMENTS: MAX CUES

## 2024-05-01 ASSESSMENT — PAIN DESCRIPTION - LOCATION: LOCATION: KNEE

## 2024-05-01 NOTE — PROGRESS NOTES
Occupational Therapy    OT Treatment    Patient Name: Katya Sandhu  MRN: [id number]  Today's Date: 5/1/2024  Time Calculation  Start Time: 0955  Stop Time: 1034  Time Calculation (min): 39 min         Assessment:  End of Session Communication: Bedside nurse  End of Session Patient Position: Alarm on, Up in chair       Plan:  OT Frequency: Daily  OT Discharge Recommendations: Moderate intensity level of continued care       Subjective        05/01/24 0955   OT Last Visit   OT Received On 05/01/24   General   Prior to Session Communication Bedside nurse   Patient Position Received Bed, 3 rail up;Alarm on   Preferred Learning Style verbal;visual   General Comment Pt confused and legs leaning off bed, ALMEIDA in to assist. RN requesting pt back in bed at end of session to rest. Stated she was recently up in chair this AM.   UE Bathing   UE Bathing Level of Assistance Minimum assistance;Moderate assistance   UE Bathing Where Assessed Edge of bed   UE Bathing Comments max cues   LE Bathing   LE Bathing Level of Assistance Maximum assistance   LE Bathing Where Assessed Edge of bed   LE Bathing Comments max cues   Toileting   Toileting Level of Assistance Dependent   Where Assessed   (EOB)   Toileting Comments Incontinent upon standing, Dep A for keesha and incontinence care.   Bed Mobility   Bed Mobility Yes   Bed Mobility 1   Bed Mobility 1 Sitting to supine   Level of Assistance 1 Moderate assistance   Bed Mobility 2   Bed Mobility  2 Sitting to supine   Level of Assistance 2 Moderate assistance   Transfers   Transfer Yes   Transfer 1   Transfer From 1 Sit to   Transfer to 1 Stand   Technique 1 Sit to stand;Stand to sit   Transfer Device 1 Gait belt   Transfer Level of Assistance 1 Moderate assistance   Trials/Comments 1 Max cues for initiation to task. Multiple sit<>stands during ADL completion.   Inpatient Plan   OT Frequency Daily   OT Discharge Recommendations Moderate intensity level of continued care        Outcome Measures:[facility] Daily Activity  Putting on and taking off regular lower body clothing: A lot  Bathing (including washing, rinsing, drying): A lot  Putting on and taking off regular upper body clothing: A lot  Toileting, which includes using toilet, bedpan or urinal: A lot  Taking care of personal grooming such as brushing teeth: A lot  Eating Meals: A lot  Daily Activity - Total Score: 12  Education Documentation  Body Mechanics, taught by ELPIDIO Bueno at 5/1/2024  3:26 PM.  Learner: Patient  Readiness: Acceptance  Method: Demonstration, Explanation  Response: Needs Reinforcement    Precautions, taught by ELPIDIO Bueno at 5/1/2024  3:26 PM.  Learner: Patient  Readiness: Acceptance  Method: Demonstration, Explanation  Response: Needs Reinforcement    ADL Training, taught by ELPIDIO Bueno at 5/1/2024  3:26 PM.  Learner: Patient  Readiness: Acceptance  Method: Demonstration, Explanation  Response: Needs Reinforcement    Education Comments  No comments found.            Goals:  Encounter Problems       Encounter Problems (Resolved)       Dressings Lower Extremities       STG - Patient to complete lower body dressing SUP (Adequate for Discharge)       Start:  04/25/24    Expected End:  05/03/24    Resolved:  05/01/24            Grooming       STG - Patient completes grooming SUP (Adequate for Discharge)       Start:  04/25/24    Expected End:  05/03/24    Resolved:  05/01/24         STG - Patient will tolerate standing 2-4min (Adequate for Discharge)       Start:  04/25/24    Expected End:  05/03/24    Resolved:  05/01/24            OT Transfers       STG - Patient will perform toilet transfer min assist (Adequate for Discharge)       Start:  04/25/24    Expected End:  05/03/24    Resolved:  05/01/24

## 2024-05-01 NOTE — PROGRESS NOTES
05/01/24 1059   Discharge Planning   Home or Post Acute Services Post acute facilities (Rehab/SNF/etc)   Type of Post Acute Facility Services Skilled nursing   Patient expects to be discharged to: [facility]     Updated the pt's  with transport time and he agrees.

## 2024-05-01 NOTE — PROGRESS NOTES
05/01/24 0834   Discharge Planning   Patient expects to be discharged to: [facility]   Does the patient need discharge transport arranged? Yes   RoundTrip coordination needed? Yes   Has discharge transport been arranged? Yes   What day is the transport expected? 05/01/24   What time is the transport expected? 1245     Patient has d/c order in. Capac and AVS sent to facility. Stretcher transport arranged for 12:45 . Facility and nursing aware. Message left for patient's  regarding transport time.

## 2024-05-01 NOTE — PROGRESS NOTES
"INPATIENT PROGRESS NOTES    PATIENT NAME: Katya Sandhu    MRN: [id number]  SERVICE DATE:  5/1/2024   SERVICE TIME:  2:45 PM    SIGNATURE: Titus Aly MD      SUBJECTIVE  Afebrile  No events overnight       OBJECTIVE  PHYSICAL EXAM:   Patient Vitals for the past 24 hrs:   BP Temp Temp src Pulse Resp SpO2   05/01/24 1200 135/87 36.8 °C (98.2 °F) -- 96 18 97 %   05/01/24 0800 133/89 36.8 °C (98.2 °F) -- 96 18 99 %   05/01/24 0400 140/81 36.4 °C (97.5 °F) Temporal 88 18 98 %   05/01/24 0000 130/85 36.5 °C (97.7 °F) Temporal 90 18 97 %   04/30/24 2000 129/80 36.2 °C (97.2 °F) Temporal 87 16 98 %   04/30/24 1600 121/78 36.8 °C (98.2 °F) Temporal 83 17 98 %         Gen: NAD  Neck: symmetric, no mass  Cardiovascular: RRR  Respiratory: No distress   GI: Abd soft, nontender, non-distended  Extremities: no leg edema    Labs:  Lab Results   Component Value Date    WBC 10.1 05/01/2024    HGB 12.4 05/01/2024    HCT 37.4 05/01/2024    MCV 99 05/01/2024     05/01/2024     Lab Results   Component Value Date    GLUCOSE 103 (H) 05/01/2024    CALCIUM 9.4 05/01/2024     05/01/2024    K 3.9 05/01/2024    CO2 22 05/01/2024     05/01/2024    BUN 9 05/01/2024    CREATININE 0.58 05/01/2024   ESR: --No results found for: \"SEDRATE\"No results found for: \"CRP\"  Lab Results   Component Value Date    ALT 33 04/23/2024    AST 46 (H) 04/23/2024    ALKPHOS 70 04/23/2024    BILITOT 0.8 04/23/2024         DATA:   Diagnostic tests reviewed for today's visit:    Labs this admission reviewed  Imagings this admission reviewed  Cultures: Reviewed        ASSESSMENT :   74-year-old female with past medical history of alcohol abuse who presented status post fall found to have rhabdomyolysis, ID team was consulted with a concern of pneumonia.  Chest x-ray was done due to leukocytosis and showed left lower lobe consolidation versus atelectasis, no hypoxia or fever.        PLAN:  Remains clinically stable off antibiotics, no " "shortness of breath or hypoxia, no indication for antibiotics treatment at this point.  The patient is cleared for discharge from ID standpoint       Titus Aly MD.   Infectious Diseases Attending            This note was partially created using voice recognition software and is inherently subject to errors including those of syntax and \"sound-alike\" substitutions which may escape proofreading. In such instances, original meaning may be extrapolated by contextual derivation      "

## 2024-05-01 NOTE — PROGRESS NOTES
Internal Medicine Progress Note    Patient Name: Katya Sandhu          MRN: [id number]  Today's Date: April 30, 2024          Attending: Toan Arriaga MD    Subjective:  Patient was seen and examined at bedside.    Review Of Systems:  Negative for chest pain, shortness of breath, abdominal pain,    Objective:  Vitals:    04/30/24 0400 04/30/24 0800 04/30/24 1200 04/30/24 1600   BP: 135/80 118/76 126/77 121/78   BP Location:  Right arm Right arm Right arm   Patient Position:  Lying Sitting Sitting   Pulse: 82 82 84 83   Resp: 16 15 17 17   Temp: 36.5 °C (97.7 °F) 36.8 °C (98.2 °F) 37.1 °C (98.8 °F) 36.8 °C (98.2 °F)   TempSrc: Temporal Temporal Temporal Temporal   SpO2: 98% 93% 93% 98%   Weight:       Height:           Physical Exam:   General appearance: Alert, in no acute distress  Neck: Surgical wounds on the anterior aspect of the neck  Lungs: Clear to auscultation, no wheezing or rhonchi  Heart: RRR without murmur.   Abdomen: Soft, non-tender.  Neuro: Awake, cooperative    Labs:  Results for orders placed or performed during the hospital encounter of 04/23/24 (from the past 24 hour(s))   CBC   Result Value Ref Range    WBC 11.4 (H) 4.4 - 11.3 x10*3/uL    nRBC 0.0 0.0 - 0.0 /100 WBCs    RBC 3.64 (L) 4.00 - 5.20 x10*6/uL    Hemoglobin 12.1 12.0 - 16.0 g/dL    Hematocrit 36.6 36.0 - 46.0 %     (H) 80 - 100 fL    MCH 33.2 26.0 - 34.0 pg    MCHC 33.1 32.0 - 36.0 g/dL    RDW 13.2 11.5 - 14.5 %    Platelets 185 150 - 450 x10*3/uL   Basic Metabolic Panel   Result Value Ref Range    Glucose 93 74 - 99 mg/dL    Sodium 137 136 - 145 mmol/L    Potassium 3.8 3.5 - 5.3 mmol/L    Chloride 107 98 - 107 mmol/L    Bicarbonate 18 (L) 21 - 32 mmol/L    Anion Gap 16 10 - 20 mmol/L    Urea Nitrogen 11 6 - 23 mg/dL    Creatinine 0.55 0.50 - 1.05 mg/dL    eGFR >90 >60 mL/min/1.73m*2    Calcium 8.7 8.6 - 10.3 mg/dL       Medications:  Scheduled medications  folic acid, 1 mg, oral, Daily  heparin (porcine), 5,000 Units,  "subcutaneous, q8h  multivitamin with minerals, 1 tablet, oral, Daily  polyethylene glycol, 17 g, oral, Daily  zonisamide, 200 mg, oral, Daily      Continuous medications  lactated Ringer's, 75 mL/hr, Last Rate: 75 mL/hr (04/30/24 1615)      PRN medications  PRN medications: acetaminophen, dextrose, dextrose, glucagon, glucagon, haloperidol lactate, melatonin, naloxone, ondansetron ODT **OR** ondansetron, oxyCODONE      Assessment/Plan:    Rhabdomyolysis  Resolved  Continue monitoring    Dysphagia  Patient passed modified barium swallow  Continue monitoring       ETOH abuse  Continue current medications and measures  Psychiatry is following       Fall  PT/OT       Elevated lactic acid level  IV fluids       Neck pain    Closed nondisplaced fracture of sixth cervical vertebra (Multi)  Patient underwent Anterior cervical C6-7 discectomy and fusion with plating  Reduction of fracture  Neurosurgery is following  Pain control    Altered Mental status  Improving  Neurology and psychiatry following    Leukocytosis  Abnormal chest x-ray  History restart patient on Zosyn  ID evaluated the patient, recommended to discontinue antibiotics  Leukocytosis improving    Possible discharge tomorrow  Discussed with patient, RN    Toan Arriaga MD   Date: 04/30/24  Time: 8:20 PM    This note was partially created using voice recognition software and is inherently subject to errors including those of syntax and \"sound-alike\" substitutions which may escape proofreading. In such instances, original meaning may be extrapolated by contextual derivation    "

## 2024-05-01 NOTE — CARE PLAN
Pt was very restless overnight, Pt was repositioned and medicated this shift. Pt had no acute changes overnight. Pt ciwa remains unchanged. Pt neurovascular checks stable. Pt has LR at 75 and tolerated well. Pt had IV replaced this shift.Pt safety been maintained.

## 2024-05-01 NOTE — NURSING NOTE
Pt calm and cooperative through shift. Pt did not complain of pain. Pt rested through shift. Pt repositioned every two hours and was up to chair for breakfast. Pt will be discharged, IV removed. Pt will use stretcher to facility.

## 2024-05-01 NOTE — PROGRESS NOTES
Speech-Language Pathology    SLP Adult Inpatient  Speech-Language Pathology Treatment     Patient Name: Katya Sandhu  MRN: [id number]  Today's Date: 5/1/2024  Time Calculation  Start Time: 1008  Stop Time: 1027  Time Calculation (min): 19 min         Current Problem:   1. Rhabdomyolysis        2. Closed head injury, initial encounter        3. Closed nondisplaced fracture of sixth cervical vertebra, unspecified fracture morphology, initial encounter (Multi)  Case Request Operating Room: C6-7 anterior cervical discectomy and fusion, reduction of fracture    Case Request Operating Room: C6-7 anterior cervical discectomy and fusion, reduction of fracture    melatonin 5 mg tablet    polyethylene glycol (Glycolax, Miralax) 17 gram packet      4. Neck pain  acetaminophen (Tylenol) 325 mg tablet      5. Acute postoperative pain  acetaminophen (Tylenol) 325 mg tablet      6. Oropharyngeal dysphagia        7. ETOH abuse  folic acid (Folvite) 1 mg tablet    multivitamin with minerals tablet            SLP Assessment:  SLP TX Intervention Outcome: Making Progress Towards Goals  SLP Assessment Results: Other (Comment) (mild oropharyngeal dysphagia per MBSS 4/30/24)  Prognosis: Good  Treatment Provided: Yes.  Patient seen this date in follow-up to MBSS completed 4/29/24. Results and recommendations per MBSS reviewed with patient. Training and instruction was provided in safe swallowing strategies including double/repeat swallows, alternating bites/sips, and effortful swallow. Written strategies provided, due to patient's fluctuating level of alertness. Patient verbalized understanding, and demonstrated use of strategies with PO trials of thin liquid via straw and pureed solids. No overt s/s of aspiration were observed. Discussed with patient that dysphagia is anticipate to improve as patient recovers from recent ACDF. Patient to continue recommendations per MBSS. ST to continue to follow during inpatient stay to ensure  diet tolerance/improvement of dysphagia as anticipated, and provide education/training in safe swallowing strategies to reduce risk of aspiration.   Treatment Tolerance: Patient limited by fatigue  Education Provided: Yes       Plan:  Inpatient/Swing Bed or Outpatient: Inpatient  Treatment/Interventions: Other (Comment) (dysphagia management)  SLP TX Plan: Continue Plan of Care  SLP Plan: Skilled SLP  SLP Frequency: 1x per week  Duration: Current admission  SLP Discharge Recommendations: Other (Comment)  Next Treatment Priority: Compensatory strategies, diet tolerance  Discussed POC: Patient  Patient/Caregiver Agreeable: Yes      Subjective   Patient was responsive to stimulation, but would fall back asleep easily. Patient with language of confusion noted.    Most Recent Visit:  SLP Received On: 05/01/24    General Visit Information:   Reason for Referral: Swallow evaluation - suspected oral or pharyngeal dysphagia, rule out aspiration, determine if MBS is needed  Referred By: MAXIMILIANO Awan-CNP  Past Medical History Relevant to Rehab: s/p ACDF C6-7 on 4/24; dementia, depression, hypertension, brain lesion, hyperlipidemia, hypothyroidism, alcohol withdrawal seizure,  EtOH, and recurrent falls  Patient Seen During This Visit: Yes  Caregiver Feedback: RN reports patient has been doing well with PO meals and medications.  Total Number of Visits : 3  Prior to Session Communication: Bedside nurse      Pain Assessment:   Pain Assessment: 0-10  Pain Score: 10 - Worst possible pain  Pain Type: Chronic pain  Pain Location: Back      Objective   Goals (established 4/30/24):  1. Patient will tolerate baseline diet absent of overt s/s of aspiration in 90% of observed therapeutic trials.  Status: Progressing, continue with goal  Progress: Patient tolerated PO trials without overt s/s of aspiration in 90% of trials     2. Patient will implement safe swallowing strategies to reduce risk of aspiration in 90% of trials given  caregiver assistance/cueing as needed.  Status: Progressing, continue with goal  Progress: Training/instruction provided in compensatory strategies, with written reminder left at bedside.    Therapeutic Swallow:  Therapeutic Swallow Intervention : PO Trials, Compensatory Strategies, Caregiver Education  Solid Diet Recommendations: Regular (IDDSI Level 7)  Liquid Diet Recommendations: Thin (IDDSI Level 0)  Swallow Comments: STRATEGIES:   -Sit upright 90 degrees for all PO  -Remain upright 20-30 minutes after meals  -Feed/eat at a slow rate  -Small bite/sip  -Swallow 2-3 times per bite/sip  -Alternate liquids/solids  -Effortful swallow -Medications crushed in puree  -Diligent and regular oral Care    Inpatient:  Education:  Learner patient   Barriers to Learning cognition   Method demonstration; verbal; written   Education - Topic ST provided patient education regarding results and recommendations per MBSS, goals of treatment, and plan of care. Patient verbalized comprehension. Education will be reinforced. ST further coordinated with RN regarding recommendations per MBSS, with RN verbalizing understanding.      Outcome    Verbalized understanding and agreement; needs reinforcement

## 2024-05-02 ENCOUNTER — NURSING HOME VISIT (OUTPATIENT)
Dept: POST ACUTE CARE | Facility: EXTERNAL LOCATION | Age: 74
End: 2024-05-02
Payer: MEDICARE

## 2024-05-02 ENCOUNTER — TELEPHONE (OUTPATIENT)
Dept: PRIMARY CARE | Facility: CLINIC | Age: 74
End: 2024-05-02
Payer: MEDICARE

## 2024-05-02 DIAGNOSIS — T79.6XXD TRAUMATIC RHABDOMYOLYSIS, SUBSEQUENT ENCOUNTER: ICD-10-CM

## 2024-05-02 DIAGNOSIS — F10.27 MODERATE DEMENTIA ASSOCIATED WITH ALCOHOLISM, WITHOUT BEHAVIORAL DISTURBANCE, PSYCHOTIC DISTURBANCE, MOOD DISTURBANCE, OR ANXIETY (MULTI): ICD-10-CM

## 2024-05-02 DIAGNOSIS — R56.9: Primary | ICD-10-CM

## 2024-05-02 DIAGNOSIS — E03.9 HYPOTHYROIDISM, UNSPECIFIED TYPE: ICD-10-CM

## 2024-05-02 DIAGNOSIS — F10.939: Primary | ICD-10-CM

## 2024-05-02 DIAGNOSIS — S12.501D CLOSED NONDISPLACED FRACTURE OF SIXTH CERVICAL VERTEBRA WITH ROUTINE HEALING, UNSPECIFIED FRACTURE MORPHOLOGY, SUBSEQUENT ENCOUNTER: ICD-10-CM

## 2024-05-02 DIAGNOSIS — S09.90XS CLOSED HEAD INJURY, SEQUELA: ICD-10-CM

## 2024-05-02 DIAGNOSIS — F10.10 ETOH ABUSE: ICD-10-CM

## 2024-05-02 PROBLEM — T79.6XXA TRAUMATIC RHABDOMYOLYSIS (CMS-HCC): Status: ACTIVE | Noted: 2024-04-23

## 2024-05-02 PROCEDURE — 99306 1ST NF CARE HIGH MDM 50: CPT | Performed by: FAMILY MEDICINE

## 2024-05-02 NOTE — ASSESSMENT & PLAN NOTE
This was a result of a mechanical fall.  She was treated in the hospital with a discectomy and fusion procedure on April 24.  We will pursue maximum falls risk interventions while she is here.  She is able to move the neck without pain and continues to deny any numbness or tingling in her arms.  She is eating lunch and appears to have good manual dexterity.

## 2024-05-02 NOTE — ASSESSMENT & PLAN NOTE
CK level on admission was 893 and trended down to 339 while in the hospital.  At discharge creatinine was normal at 0.5 with a normal filtration rate.  I will repeat the metabolic panel and creatinine kinase next week to ensure the trend continues downward

## 2024-05-02 NOTE — ASSESSMENT & PLAN NOTE
She seems very confused and has difficulty expressing her wishes on my examination today.  Nursing staff informs me that this is exactly baseline since she was admitted here yesterday.  Is unclear to me if this is alcoholic related encephalopathy or some other underlying dementia.  Brain imaging was performed in the hospital which did not show any acute process.  We will continue supportive care and monitor

## 2024-05-02 NOTE — ASSESSMENT & PLAN NOTE
Lab Results   Component Value Date    TSH 3.78 05/03/2022     This is well-controlled.  Will continue her thyroid supplement

## 2024-05-02 NOTE — TELEPHONE ENCOUNTER
Joaquin from [facility] had called and wanted to make sure that you had received his message yesterday, that the patient Katya was admitted on 05/01/2024 and is in   Room # 502A.  Any questions that you may have, you can reach him at # [phone]. Thank you!

## 2024-05-02 NOTE — PROGRESS NOTES
Admission H&P  Subjective   Katya Sandhu is a 74 y.o. female who is being seen for an admission H&P.  Nursing notes, vital signs, and labs were reviewed in the local facility chart and she  is being evaluated for multiple medical problems.     HPI this 74-year-old female was admitted to the hospital after a mechanical fall sustaining a C-6 fracture treated with C6-7 discectomy and fusion.  There was also significant rhabdomyolysis.  She was treated with IV hydration and monitoring.  She was also treated for alcohol withdrawals with CIWA precautions.  Objective   LMP  (LMP Unknown)   Physical Exam  Constitutional:       Appearance: Normal appearance.   HENT:      Head: Normocephalic.   Eyes:      Conjunctiva/sclera: Conjunctivae normal.   Neck:      Comments: Anterior incision is clean and dry with dissolvable sutures in place.  The wound is nearly closed.  There is a significant amount of ecchymosis around the wound and on the posterior neck area as well  Cardiovascular:      Rate and Rhythm: Normal rate and regular rhythm.      Heart sounds: Normal heart sounds.   Pulmonary:      Effort: Pulmonary effort is normal.      Breath sounds: Normal breath sounds.   Musculoskeletal:      Cervical back: Neck supple.   Skin:     General: Skin is warm and dry.   Neurological:      Mental Status: She is alert.   Psychiatric:         Mood and Affect: Mood normal.       Assessment/Plan   This 74-year-old female was admitted here after a mechanical fall likely related to alcohol abuse sustaining a neck fracture and rhabdomyolysis.  The rhabdomyolysis improved with IV fluids and she had fusion of 2 neck vertebrae's.  She comes here for physical therapy occupational therapy and supportive care.  Problem List Items Addressed This Visit       Alcohol withdrawal seizure, with unspecified complication (Multi) - Primary    Closed head injury    Closed nondisplaced fracture of sixth cervical vertebra (Multi)     This was a  result of a mechanical fall.  She was treated in the hospital with a discectomy and fusion procedure on April 24.  We will pursue maximum falls risk interventions while she is here.  She is able to move the neck without pain and continues to deny any numbness or tingling in her arms.  She is eating lunch and appears to have good manual dexterity.         ETOH abuse     She was following with the CIWA protocol in the hospital to prevent DTs.  She completed the course and was weaned completely off the benzodiazepine.  She is not exhibiting any withdrawal symptoms here.  We will strongly encourage she remains on alcohol cessation when she discharges here and provide supportive care.         Hypothyroidism     Lab Results   Component Value Date    TSH 3.78 05/03/2022     This is well-controlled.  Will continue her thyroid supplement         Moderate dementia associated with alcoholism, without behavioral disturbance, psychotic disturbance, mood disturbance, or anxiety (Multi)     She seems very confused and has difficulty expressing her wishes on my examination today.  Nursing staff informs me that this is exactly baseline since she was admitted here yesterday.  Is unclear to me if this is alcoholic related encephalopathy or some other underlying dementia.  Brain imaging was performed in the hospital which did not show any acute process.  We will continue supportive care and monitor         Traumatic rhabdomyolysis (CMS-[facility])     CK level on admission was 893 and trended down to 339 while in the hospital.  At discharge creatinine was normal at 0.5 with a normal filtration rate.  I will repeat the metabolic panel and creatinine kinase next week to ensure the trend continues downward

## 2024-05-02 NOTE — LETTER
Patient: Katya Sandhu  : 1950    Encounter Date: 2024    Admission H&P  Subjective  Katya Sandhu is a 74 y.o. female who is being seen for an admission H&P.  Nursing notes, vital signs, and labs were reviewed in the local facility chart and she  is being evaluated for multiple medical problems.     HPI this 74-year-old female was admitted to the hospital after a mechanical fall sustaining a C-6 fracture treated with C6-7 discectomy and fusion.  There was also significant rhabdomyolysis.  She was treated with IV hydration and monitoring.  She was also treated for alcohol withdrawals with CIWA precautions.  Objective  LMP  (LMP Unknown)   Physical Exam  Constitutional:       Appearance: Normal appearance.   HENT:      Head: Normocephalic.   Eyes:      Conjunctiva/sclera: Conjunctivae normal.   Neck:      Comments: Anterior incision is clean and dry with dissolvable sutures in place.  The wound is nearly closed.  There is a significant amount of ecchymosis around the wound and on the posterior neck area as well  Cardiovascular:      Rate and Rhythm: Normal rate and regular rhythm.      Heart sounds: Normal heart sounds.   Pulmonary:      Effort: Pulmonary effort is normal.      Breath sounds: Normal breath sounds.   Musculoskeletal:      Cervical back: Neck supple.   Skin:     General: Skin is warm and dry.   Neurological:      Mental Status: She is alert.   Psychiatric:         Mood and Affect: Mood normal.       Assessment/Plan  This 74-year-old female was admitted here after a mechanical fall likely related to alcohol abuse sustaining a neck fracture and rhabdomyolysis.  The rhabdomyolysis improved with IV fluids and she had fusion of 2 neck vertebrae's.  She comes here for physical therapy occupational therapy and supportive care.  Problem List Items Addressed This Visit       Alcohol withdrawal seizure, with unspecified complication (Multi) - Primary    Closed head injury     Closed nondisplaced fracture of sixth cervical vertebra (Multi)     This was a result of a mechanical fall.  She was treated in the hospital with a discectomy and fusion procedure on April 24.  We will pursue maximum falls risk interventions while she is here.  She is able to move the neck without pain and continues to deny any numbness or tingling in her arms.  She is eating lunch and appears to have good manual dexterity.         ETOH abuse     She was following with the CIWA protocol in the hospital to prevent DTs.  She completed the course and was weaned completely off the benzodiazepine.  She is not exhibiting any withdrawal symptoms here.  We will strongly encourage she remains on alcohol cessation when she discharges here and provide supportive care.         Hypothyroidism     Lab Results   Component Value Date    TSH 3.78 05/03/2022     This is well-controlled.  Will continue her thyroid supplement         Moderate dementia associated with alcoholism, without behavioral disturbance, psychotic disturbance, mood disturbance, or anxiety (Multi)     She seems very confused and has difficulty expressing her wishes on my examination today.  Nursing staff informs me that this is exactly baseline since she was admitted here yesterday.  Is unclear to me if this is alcoholic related encephalopathy or some other underlying dementia.  Brain imaging was performed in the hospital which did not show any acute process.  We will continue supportive care and monitor         Traumatic rhabdomyolysis (CMS-[facility])     CK level on admission was 893 and trended down to 339 while in the hospital.  At discharge creatinine was normal at 0.5 with a normal filtration rate.  I will repeat the metabolic panel and creatinine kinase next week to ensure the trend continues downward               Electronically Signed By: Duran Escobar MD   5/2/24 12:04 PM

## 2024-05-02 NOTE — ASSESSMENT & PLAN NOTE
She was following with the CIWA protocol in the hospital to prevent DTs.  She completed the course and was weaned completely off the benzodiazepine.  She is not exhibiting any withdrawal symptoms here.  We will strongly encourage she remains on alcohol cessation when she discharges here and provide supportive care.

## 2024-05-09 DIAGNOSIS — S09.90XS CLOSED HEAD INJURY, SEQUELA: ICD-10-CM

## 2024-05-09 DIAGNOSIS — R47.01 APHASIA: Primary | ICD-10-CM

## 2024-05-17 NOTE — DISCHARGE SUMMARY
Discharge Diagnosis  Traumatic rhabdomyolysis (CMS-HCC)  Closed nondisplaced fracture of C6  Altered mental status  Leukocytosis  Fall  Elevated lactic acid  Dysphagia    Discharge Meds     Your medication list        START taking these medications        Instructions Last Dose Given Next Dose Due   acetaminophen 325 mg tablet  Commonly known as: Tylenol      Take 2 tablets (650 mg) by mouth every 6 hours if needed for moderate pain (4 - 6), fever (temp greater than 38.0 C), headaches or mild pain (1 - 3) for up to 14 days.       folic acid 1 mg tablet  Commonly known as: Folvite      Take 1 tablet (1 mg) by mouth once daily.       melatonin 5 mg tablet      Take 1 tablet (5 mg) by mouth as needed at bedtime for sleep.       multivitamin with minerals tablet      Take 1 tablet by mouth once daily.       polyethylene glycol 17 gram packet  Commonly known as: Glycolax, Miralax      Take 17 g by mouth once daily.              CONTINUE taking these medications        Instructions Last Dose Given Next Dose Due   levothyroxine 50 mcg tablet  Commonly known as: Synthroid, Levoxyl           zonisamide 100 mg capsule  Commonly known as: Zonegran      Take 2 capsules (200 mg) by mouth once daily.              STOP taking these medications      meloxicam 7.5 mg tablet  Commonly known as: Mobic        spironolactone 50 mg tablet  Commonly known as: Aldactone        SUMAtriptan 25 mg tablet  Commonly known as: Imitrex        venlafaxine XR 75 mg 24 hr capsule  Commonly known as: Effexor-XR                  Where to Get Your Medications        These medications were sent to [facility]/pharmacy #[id number] - [place], [place] - [address] COR[address] OF [facility]  [address], [facility] [address]      Phone: [phone]   acetaminophen 325 mg tablet  folic acid 1 mg tablet  multivitamin with minerals tablet  polyethylene glycol 17 gram packet       Information about where to get these medications is not yet available    Ask your nurse or  doctor about these medications  melatonin 5 mg tablet         Test Results Pending At Discharge  Pending Labs       No current pending labs.            Hospital Course:  Patient is a 74-year-old female with history of alcohol abuse, admitted to the hospital after she had a fall, patient was complaining of severe neck pain, MRI of the C-spine showed traumatic kyphosis at C6-7, severe canal stenosis with cord edema and epidural hematoma, patient underwent C6-7 ACDF with reduction of fracture by Dr. Forrest Min, patient was evaluated by neurosurgery, neurology, psychiatry, while hospitalized patient had leukocytosis, started antibiotics and consult ID, Dr. Aly evaluated the patient and decided to discontinue antibiotics, patient was discharged to rehab facility.    Discharge time 35 minutes    Pertinent Physical Exam At Time of Discharge  Physical Exam  Constitutional:       Appearance: Normal appearance.   HENT:      Head: Normocephalic.      Mouth/Throat:      Mouth: Mucous membranes are moist.      Pharynx: Oropharynx is clear.   Eyes:      Conjunctiva/sclera: Conjunctivae normal.      Pupils: Pupils are equal, round, and reactive to light.   Cardiovascular:      Rate and Rhythm: Normal rate and regular rhythm.      Pulses: Normal pulses.      Heart sounds: Normal heart sounds.   Pulmonary:      Effort: Pulmonary effort is normal.      Breath sounds: Normal breath sounds.   Abdominal:      General: Abdomen is flat. Bowel sounds are normal.      Palpations: Abdomen is soft.   Neurological:      General: No focal deficit present.      Mental Status: She is alert and oriented to person, place, and time.         Outpatient Follow-Up  Future Appointments   Date Time Provider Department Center   6/5/2024  2:30 PM Narendra Hines PA-C [id number] [place]   3/19/2025 10:30 AM Negin Terrazas MD [id number] [place]         Toan Arriaga MD

## 2024-05-27 LAB
ATRIAL RATE: 80 BPM
P AXIS: 79 DEGREES
P OFFSET: 183 MS
P ONSET: 134 MS
PR INTERVAL: 176 MS
Q ONSET: 222 MS
QRS COUNT: 13 BEATS
QRS DURATION: 138 MS
QT INTERVAL: 420 MS
QTC CALCULATION(BAZETT): 484 MS
QTC FREDERICIA: 462 MS
R AXIS: 53 DEGREES
T AXIS: 77 DEGREES
T OFFSET: 432 MS
VENTRICULAR RATE: 80 BPM

## 2024-06-05 ENCOUNTER — OFFICE VISIT (OUTPATIENT)
Dept: NEUROSURGERY | Facility: CLINIC | Age: 74
End: 2024-06-05
Payer: MEDICARE

## 2024-06-05 VITALS
HEART RATE: 81 BPM | BODY MASS INDEX: 19.53 KG/M2 | HEIGHT: 65 IN | SYSTOLIC BLOOD PRESSURE: 133 MMHG | DIASTOLIC BLOOD PRESSURE: 78 MMHG | WEIGHT: 117.2 LBS

## 2024-06-05 DIAGNOSIS — Z98.1 STATUS POST CERVICAL SPINAL FUSION: Primary | ICD-10-CM

## 2024-06-05 PROCEDURE — 3078F DIAST BP <80 MM HG: CPT | Performed by: PHYSICIAN ASSISTANT

## 2024-06-05 PROCEDURE — 1159F MED LIST DOCD IN RCRD: CPT | Performed by: PHYSICIAN ASSISTANT

## 2024-06-05 PROCEDURE — 99024 POSTOP FOLLOW-UP VISIT: CPT | Performed by: PHYSICIAN ASSISTANT

## 2024-06-05 PROCEDURE — 3075F SYST BP GE 130 - 139MM HG: CPT | Performed by: PHYSICIAN ASSISTANT

## 2024-06-05 PROCEDURE — 1036F TOBACCO NON-USER: CPT | Performed by: PHYSICIAN ASSISTANT

## 2024-06-05 PROCEDURE — 1125F AMNT PAIN NOTED PAIN PRSNT: CPT | Performed by: PHYSICIAN ASSISTANT

## 2024-06-05 ASSESSMENT — PAIN SCALES - GENERAL: PAINLEVEL: 6

## 2024-06-05 ASSESSMENT — PATIENT HEALTH QUESTIONNAIRE - PHQ9
1. LITTLE INTEREST OR PLEASURE IN DOING THINGS: NOT AT ALL
SUM OF ALL RESPONSES TO PHQ9 QUESTIONS 1 & 2: 0
2. FEELING DOWN, DEPRESSED OR HOPELESS: NOT AT ALL

## 2024-06-05 NOTE — PROGRESS NOTES
[facility] [place]  Department of Neurological Surgery  Post Operative Patient Visit      History of Present Illness:  Katya Sandhu is a 74 y.o. year old female who presents post C6/7 urgent ACDF reduction of fracture by Dr. Forrest Min on April 24, 2024.  She does continue with some mild radicular symptoms as expected along C7 dermatome.  Also with posterior right-sided muscle tightness.  Inspection of her incision shows well-approximated, nonerythemic, nonedematous surgical incision with good fibrotic tissue spanning.  She is utilizing Tylenol for pain relief.  I recommended he as well as rest though will be given with referral to physical therapy at this time.  Updated x-rays also to be taken just prior to follow-up with Dr. Min.        14/14 systems reviewed and negative other than what is listed in the history of present illness    Patient Active Problem List   Diagnosis    ETOH abuse    Aphasia    Depression    HTN (hypertension)    Hypothyroidism    Localized, primary osteoarthritis    Migraine headache    Myocardial infarction, inferolateral wall (Multi)    Brain lesion    Seizure disorder (Multi)    Traumatic rhabdomyolysis (CMS-HCC)    Fall    Closed head injury    Elevated lactic acid level    Neck pain    Alcohol withdrawal seizure, with unspecified complication (Multi)    Closed nondisplaced fracture of sixth cervical vertebra (Multi)    Moderate dementia associated with alcoholism, without behavioral disturbance, psychotic disturbance, mood disturbance, or anxiety (Multi)     Past Medical History:   Diagnosis Date    Brain lesion     Depression     ETOH abuse     HLD (hyperlipidemia)     Hypertension     Hypothyroidism      Past Surgical History:   Procedure Laterality Date    BRAIN SURGERY      CT ANGIO NECK  04/24/2022    CT NECK ANGIO W AND WO IV CONTRAST 4/24/2022 [facility] CLINICAL LEGAC[facility]    CT HEAD ANGIO W AND WO IV CONTRAST  04/24/2022    CT HEAD ANGIO W AND WO IV  CONTRAST 4/24/2022 [facility] CLINICAL LEGACY    FOOT SURGERY      TONSILLECTOMY       Social History     Tobacco Use    Smoking status: Never    Smokeless tobacco: Never   Substance Use Topics    Alcohol use: Yes     Alcohol/week: 3.0 standard drinks of alcohol     Types: 3 Glasses of wine per week     Comment: 1 glass of vodka sometines     family history includes Alzheimer's disease in her father; Aneurysm in her father; HTN in her mother; Hypertension in her brother, father, and sister.    Current Outpatient Medications:     folic acid (Folvite) 1 mg tablet, Take 1 tablet (1 mg) by mouth once daily., Disp: 30 tablet, Rfl: 1    levothyroxine (Synthroid, Levoxyl) 50 mcg tablet, Take 1 tablet (50 mcg) by mouth once daily., Disp: , Rfl:     multivitamin with minerals tablet, Take 1 tablet by mouth once daily., Disp: 30 tablet, Rfl: 1    zonisamide (Zonegran) 100 mg capsule, Take 2 capsules (200 mg) by mouth once daily., Disp: 180 capsule, Rfl: 3  Allergies   Allergen Reactions    Penicillins Hives, Itching and Rash     Tolerated Pip/Tazo (April and May 2022)         The above clinical summary has been dictated with voice recognition software. It has not been proofread for grammatical errors, typographical mistakes, or other semantic inconsistencies.    Thank you for visiting our office today. It was our pleasure to take part in your healthcare.     Do not hesitate to call with any questions regarding your plan of care after leaving at [phone] M-F 8am-4pm.     To clinicians, thank you very much for this kind referral. It is a privilege to partner with you in the care of your patients. My office would be delighted to assist you with any further consultations or with questions regarding the plan of care outlined. Do not hesitate to call the office or contact me directly.       Sincerely,      Narendra Hines, MOISÉS, PA-C  Associate Physician Assistant, Neurosurgery  Clinical   [facility]  [employer]    [facility]  [address] [facility]  [address] [place] [address]    Phone: [phone]  Fax: [phone]

## 2024-06-12 NOTE — PROGRESS NOTES
"  Physical Therapy  Physical Therapy Evaluation    Patient Name: Katya Sandhu  MRN: [id number]  Today's Date: 6/13/2024  Time Calculation  Start Time: 1122  Stop Time: 1159  Time Calculation (min): 37 min    Insurance:  Visit number: 1  [employer] COPAY 35 DED 0 COVERAGE 100  OOP 4200([id number]) AUTH REQ#[id number] 1 VISIT 6-13-24 THRU 6-27-24     General:  Reason for visit: S/P cervical spinal fusion C6-7 ACDF 4/24/24 (7 weeks)  Referred by: Duran Escobar MD    Current Problem:  1. Neck pain  Follow Up In Physical Therapy      2. Aphasia  Referral to Physical Therapy      3. Closed head injury, sequela  Referral to Physical Therapy          Precautions:    Per MD: \"Can do some bending lifting twisting up to 20-30 pounds no problem\"      Medical History Form: Reviewed (scanned into chart)    Subjective:     HPI: Patient presents to PT with partner \"Saulo\" with c/o neck pain s/p ACDF 4/24/24. Patient presents as questionable historian as the patient demonstrated difficulty maintaining sustained attention and properly recalling events, so her partner recalled most of the history and answered majority of questions. Partner reports pain/problem began 4/23/24 when patient fell down stairs while being intoxicated. Partner reports after some time passed, it was necessary to take patient to the hospital so her took her himself. Per chart, patient received multiple XR, MRI, and full body CT scan. Imaging revealed nondisplaced fracture of C6 with spinal cord compression, requiring operative treatment. Received C6-7 ACDF 4/24/24. Partner reports patient went through detox in hospital for about 7 days then went to SNF for about 5 days before discharging home. Patient denies any complications post-operatively like falls, infection, or blood clots. Partner reports they were recommended to attend PT earlier, but did not see the benefit as she was functioning \"well enough\" without it. Today, patient reports she is " "having persistent n/t along her R arm which is the C7 dermatomal pattern along with soreness in her neck and R shoulder.     Chief Complaint: Patient presents to clinic s/p ACDF C6-C7  Onset Date: 4/24/24  JAI: Fall    Current Condition:   Better    Pertinent PMH includes: Edwin lesion 2022, aphasia, Depression, MI, seizure disorder, ETOH abuse  PSHx: CT head angio, CT angio neck, brain surgery, foot surgery    Pain:     Location: R neck and shoulder  Description: tingling along C7 dermatome in R UE; sore/achy to R neck and shoulder  Worst: 8/10  Best: 6/10  Aggravating Factors:  turning head, lifting arm  Relieving Factors:  tylenol, heat      Relevant Information (PMH & Previous Tests/Imaging):   MRI: 4/23/24: \"IMPRESSION:  There is traumatic kyphosis at C6-C7 with persistence and disruption  of the ligamentum flavum. There is irregularity of the C6-C7  posterior longitudinal ligament with buckling and impaction  anteriorly of the anterior longitudinal ligament and C7 vertebral  body. Findings result in severe canal stenosis with secondary cord  edema and trace epidural hematoma. Correlation for two-column spinal  injury also recommended. Neuro surgical consult recommended.\"    CT cervical 4/23/24: \"  Interpreted By:  Guerrero Zurita,   ADDENDUM:  Especially with the benefit of subsequently performed MRI, review of  the preceding CT shows interval new splaying of the posterior  elements at C6-7; probably acute fragmentation of the lower tip of  the left facet and near perching of facets also on the left. Focal  kyphosis at same level. Cord impingement confirmed on subsequent MRI   \"    Previous Interventions/Treatments: None    Prior Level of Function (PLOF)  Patient previously independent with all ADLs  Exercise/Physical Activity: none  Work/School: retired  Biggest limitation: pain      Patients Living Environment: Reviewed and no concern    Primary Language: English    Patient's Goal(s) for Therapy: return " PLOF    Red Flags: Do you have any of the following? Yes  Fever/chills, unexplained weight changes, dizziness/fainting, unexplained change in bowel or bladder functions, unexplained malaise or muscle weakness, night pain/sweats, numbness or tingling  N/T R hand C7 dermatome    Objective:    Observation - no sign of redness, warmth, drainage around neck/R shoulder. No openings of surgical site present.     Level: Dermatome:     C4 WNL     C5 WNL     C6 WNL     C7 N/T     C8  WNL      T1 WNL        Enrique's (-)  Clonus (-)  Inverted Supinator (-)  Dizziness (-)  Diplopia (-)  Dysphagia (-)  Dysarthria (+) - occasionally, after brain lesion from 2022  Drop attacks (-)      AROM  Cervical flexion: 25 degrees  Cervical extension: 20 degrees  Cervical rotation right: 22 degrees   Rotation left: 30 degrees    MMT  Shoulder deltoid flexion right: 4/5    Deltoid flexion left: 5/5  (C5) Shoulder deltoid abduction right: 4/5   Deltoid abduction left: 5/5  Shoulder ER @ 0 degrees abduction right: 4/5   ER @ 0 degrees abduction left: 5/5  Shoulder IR @ 0 degrees abduction right: 4/5   IR @ 0 degrees abduction left: 5/5  (C6) Biceps brachii right: 4/5   Biceps brachii left: 5/5  (C7) Triceps brachii right: 4/5   Triceps brachii left: 5/5    Palpation  TTP C7 and increased cervical paraspinal tone        Outcome Measures:  NDI: ...       EDUCATION: Pt educated in HEP, PT POC, anatomy, activity avoidance, proper positioning/posture to avoid sx provocation. pt verbalized understanding of PT info, but did not demonstrate good retention of information as patient repeatedly moved head into positions that provoked sx after frequent cueing for gentle movements. all questions answered.      HEP:    Scap retracts 2x10 5 seconds  Neck flexion/Extension gentle AROM  Neck rotation R/L gentle AROM    Goals: Set and discussed today    1.) Independent with HEP to expedite progress and promote goal achievement.  2.) Reduce worst reported  pain within last 48 hours to < 3/10 in order to allow patient to perform daily tasks without limitation/with decreased discomfort.  3.) Improve Cervical AROM to >/= WFL in order to allow patient to look in all directions without need for compensatory strategies.   4.) Improve R UE strength to >/= 4+/5 in order to allow patient to lift objects without needing to compensate with L UE.  5.) Report decrease in NDI by greater than or equal to 5 points to meet the MCID (IE --)      Plan of care was developed with input and agreement by the patient.    Treatment Performed:    Therapeutic Exercise:    5 min  Scap retracts x10 5 seconds  Neck flexion/Extension gentle AROM  Neck rotation R/L gentle AROM      Assessment: 73 y/o F presents with c/o R neck and shoulder pain s/p C6-7 ACDF 4/24/24. Throughout examination process, patient required frequent re-directing and displayed difficulty following verbal and tactile cues for performance of objective measures. Upon examination patient demonstrates decreased cervical AROM, decreased R UE strength, decreased sensation along C7 dermatome, and TTP along cervical paraspinals limiting overall functional mobility including turning her head and reaching. Activity limitations and participations restrictions include driving and performing ADLs. Pt presentation consistent with dx of acute neck and shoulder pain 2/2 C6-7 ACDF. Pt to benefit from outpatient PT to address deficits, maximize functional mobility and improve QOL.  The clinical presentation of this patient is evolving and their history and examination findings are consistent with a moderate complexity evaluation with good rehab potential.           Plan:   Progress neck and R UE strength, mobility, and muscular endurance per patient tolerance.     Planned Interventions include: therapeutic exercise, self-care home management, manual therapy, therapeutic activities, gait training, neuromuscular coordination, vasopneumatic, dry  needling  Frequency: 1-2 x Week  Duration: 8 Weeks      Jose Lorenzana, PT

## 2024-06-13 ENCOUNTER — EVALUATION (OUTPATIENT)
Dept: PHYSICAL THERAPY | Facility: CLINIC | Age: 74
End: 2024-06-13
Payer: MEDICARE

## 2024-06-13 DIAGNOSIS — S09.90XS CLOSED HEAD INJURY, SEQUELA: ICD-10-CM

## 2024-06-13 DIAGNOSIS — R47.01 APHASIA: ICD-10-CM

## 2024-06-13 DIAGNOSIS — M54.2 NECK PAIN: Primary | ICD-10-CM

## 2024-06-13 PROCEDURE — 97161 PT EVAL LOW COMPLEX 20 MIN: CPT | Mod: GP

## 2024-06-13 ASSESSMENT — PATIENT HEALTH QUESTIONNAIRE - PHQ9
1. LITTLE INTEREST OR PLEASURE IN DOING THINGS: NOT AT ALL
SUM OF ALL RESPONSES TO PHQ9 QUESTIONS 1 AND 2: 0
2. FEELING DOWN, DEPRESSED OR HOPELESS: NOT AT ALL

## 2024-06-13 ASSESSMENT — ENCOUNTER SYMPTOMS
DEPRESSION: 0
LOSS OF SENSATION IN FEET: 0
OCCASIONAL FEELINGS OF UNSTEADINESS: 1

## 2024-06-26 ENCOUNTER — TREATMENT (OUTPATIENT)
Dept: PHYSICAL THERAPY | Facility: CLINIC | Age: 74
End: 2024-06-26
Payer: MEDICARE

## 2024-06-26 DIAGNOSIS — M54.2 NECK PAIN: ICD-10-CM

## 2024-06-26 DIAGNOSIS — R47.01 APHASIA: Primary | ICD-10-CM

## 2024-06-26 DIAGNOSIS — S09.90XS CLOSED HEAD INJURY, SEQUELA: ICD-10-CM

## 2024-06-26 PROCEDURE — 97140 MANUAL THERAPY 1/> REGIONS: CPT | Mod: GP,CQ

## 2024-06-26 PROCEDURE — 97110 THERAPEUTIC EXERCISES: CPT | Mod: GP,CQ

## 2024-06-26 ASSESSMENT — PAIN SCALES - GENERAL: PAINLEVEL_OUTOF10: 6

## 2024-06-26 ASSESSMENT — PAIN DESCRIPTION - DESCRIPTORS: DESCRIPTORS: ACHING

## 2024-06-26 ASSESSMENT — PAIN - FUNCTIONAL ASSESSMENT: PAIN_FUNCTIONAL_ASSESSMENT: 0-10

## 2024-06-26 NOTE — PROGRESS NOTES
"Physical Therapy Treatment    Patient Name: Katya Sandhu  MRN: [id number]  Today's Date: 6/26/2024  Time Calculation  Start Time: 1049  Stop Time: 1122  Time Calculation (min): 33 min     PT Therapeutic Procedures Time Entry  Manual Therapy Time Entry: 8  Therapeutic Exercise Time Entry: 25                 Insurance:   Visit number: 2  [employer] COPAY 35 DED 0 COVERAGE 100  OOP 4200([id number]) AUTH REQ#[id number] 1 VISIT 6-13-24 THRU 6-27-24   General:  Reason for visit: S/P cervical spinal fusion C6-7 ACDF 4/24/24 (7 weeks)  Referred by: Duran Escobar MD    Pertinent PMH includes: Edwin lesion 2022, aphasia, Depression, MI, seizure disorder, ETOH abuse  PSHx: CT head angio, CT angio neck, brain surgery, foot surgery    Assessment:   Mod/Max cueing throughout session to stay on task, pacing w/ ex's and longer holds w/ stretches. Pt c/o pain w/ all motions/stretches/ex's performed today. Unsure of pt's compliance w/ HEP at this time dt poor carry over and ft w/ given ex's. She was able to complete ROM ex's however has mod limitations in all planes, w/ ext being most limited at this time. STM performed to bilat cervical p/s and upper traps w/ pt reporting good relief in pain and tightness. Ed pt on postural awareness throughout session and to work on at home. She will cont to benefit from skilled PT to address her deficits and improve her overall functional ability.     Plan:   Progress neck and R UE strength, mobility, and muscular endurance per patient tolerance.   MD fu 7/19.     Current Problem  1. Aphasia        2. Closed head injury, sequela        3. Neck pain  Follow Up In Physical Therapy          Subjective   General   Pt states \"my neck is terrible.\" States her neck \"hurts\" as well as her R shoulder . \"Are you going to work on my R shoulder too?\"   Precautions       Pain  Pain Assessment: 0-10  0-10 (Numeric) Pain Score: 6  Pain Location: Neck (R shoulder >L)  Pain Radiating Towards: R UE  Pain " Descriptors: Aching    Objective       Treatments:  Therapeutic Exercise (51570):  Chin tucks (neutral position) x5  Scap retracts 2x10 5 seconds  Neck flexion/Extension gentle AROM x10  Neck rotation R/L gentle AROM x10   Supine Cane Chest Press, Flex x10   Supine Bilat ER AROM x15      Manual (34397): STM Cervical p/s, Uts - 8 min     EDUCATION:   Access Code: [email or web address]  URL: [email or web address]  Date: 06/26/2024  Prepared by: Matt Liriano    Exercises  - Prone Quadriceps Stretch with Strap  - 1 x daily - 7 x weekly - 1 sets - 10 reps - 10 second  hold  - Prone Knee Flexion AROM  - 1 x daily - 7 x weekly - 1 sets - 15 reps  - Forward Step Down  - 1 x daily - 7 x weekly - 1 sets - 10 reps

## 2024-07-09 ENCOUNTER — TREATMENT (OUTPATIENT)
Dept: PHYSICAL THERAPY | Facility: CLINIC | Age: 74
End: 2024-07-09
Payer: MEDICARE

## 2024-07-09 DIAGNOSIS — R47.01 APHASIA: Primary | ICD-10-CM

## 2024-07-09 DIAGNOSIS — S09.90XS CLOSED HEAD INJURY, SEQUELA: ICD-10-CM

## 2024-07-09 DIAGNOSIS — M54.2 NECK PAIN: ICD-10-CM

## 2024-07-09 PROCEDURE — 97140 MANUAL THERAPY 1/> REGIONS: CPT | Mod: GP,CQ

## 2024-07-09 PROCEDURE — 97110 THERAPEUTIC EXERCISES: CPT | Mod: GP,CQ

## 2024-07-09 ASSESSMENT — PAIN SCALES - GENERAL: PAINLEVEL_OUTOF10: 5 - MODERATE PAIN

## 2024-07-09 ASSESSMENT — PAIN - FUNCTIONAL ASSESSMENT: PAIN_FUNCTIONAL_ASSESSMENT: 0-10

## 2024-07-09 ASSESSMENT — PAIN DESCRIPTION - DESCRIPTORS: DESCRIPTORS: ACHING;SQUEEZING;TIGHTNESS

## 2024-07-09 NOTE — PROGRESS NOTES
Physical Therapy Treatment    Patient Name: Katya Sandhu  MRN: [id number]  Today's Date: 7/9/2024  Time Calculation  Start Time: 1100  Stop Time: 1140  Time Calculation (min): 40 min     PT Therapeutic Procedures Time Entry  Manual Therapy Time Entry: 10  Therapeutic Exercise Time Entry: 28              Non-Billable Time  Non-billable time: 2  Insurance:   Visit number: 3    ANTHEM APPROVED  11  PT  VISITS   6-18-24 THRU 9-15-24   AUTH# [id number]  [id number]  Assessment:  NDI administered and documented in chart. Therapist had to assist pt in completion of. Cont'd w/ given ROM/postural ex's today however pt has pain and c/o w/ all motions/ex's.   Pt w/  limited R > L neck rotation as well as more pain c/o at end range. Most of her pain c/o is along medial scapular border. Cues for ex's/pacing/holds w/ fair f/t.   Added supine chin tuck into pillow and UBE (retro) to help improve posterior shoulder strength and emphasize postural awareness - fair alix observed. Mod fatigue w/ retro UBE and could not complete full 3 min.   She will cont to benefit from skilled PT to address her ROM, Strength and Endurance to return to PLOF.   Plan:    Progress neck and R UE strength, mobility, and muscular endurance per patient tolerance.   MD fu 7/19.     Current Problem  1. Aphasia        2. Closed head injury, sequela        3. Neck pain            Subjective   General   Pt states she cont's to have pain in her neck and shoulders.   Precautions       Pain  Pain Assessment: 0-10  0-10 (Numeric) Pain Score: 5 - Moderate pain  Pain Location: Neck  Pain Radiating Towards: L & R scapular regions  Pain Descriptors: Aching, Squeezing, Tightness    Objective       Treatments:  Therapeutic Exercise (68048):  Chin tucks (neutral position) x5  Chin tucks (supine into pillow) x10   Scap retracts 2x10 5 seconds  Neck flexion/Extension gentle AROM x10  Neck rotation R/L gentle AROM x10   Supine Cane Chest Press, Flex x10   Supine Bilat  ER AROM x10    UBE (Retro) 3 min      Manual (39242):  STM Cervical p/s, Uts - 10 min (seated today)     EDUCATION:  -Pt reports she misplaced her ex's given at the 1st visit. Re-printed and added ex to HEP.    Access Code: [email or web address]  URL: [email or web address]  Date: 07/09/2024  Prepared by: Matt Liriano    Exercises  - Supine Shoulder Flexion Extension AAROM with Dowel  - 1 x daily - 7 x weekly - 1 sets - 10 reps  - Seated Scapular Retraction  - 1 x daily - 7 x weekly - 1 sets - 10 reps - 3 second hold  - Seated Cervical Retraction  - 1 x daily - 7 x weekly - 1 sets - 10 reps - 3 second hold  - Seated Cervical Rotation AROM  - 1 x daily - 7 x weekly - 1 sets - 10 reps  - Seated Cervical Extension AROM  - 1 x daily - 7 x weekly - 1 sets - 10 reps  - Seated Cervical Flexion AROM  - 1 x daily - 7 x weekly - 1 sets - 10 reps  - Seated Shoulder External Rotation with Dumbbells  - 1 x daily - 7 x weekly - 1 sets - 10 reps

## 2024-07-12 ENCOUNTER — TREATMENT (OUTPATIENT)
Dept: PHYSICAL THERAPY | Facility: CLINIC | Age: 74
End: 2024-07-12
Payer: MEDICARE

## 2024-07-12 DIAGNOSIS — M54.2 NECK PAIN: ICD-10-CM

## 2024-07-12 DIAGNOSIS — R47.01 APHASIA: Primary | ICD-10-CM

## 2024-07-12 DIAGNOSIS — S09.90XS CLOSED HEAD INJURY, SEQUELA: ICD-10-CM

## 2024-07-12 PROCEDURE — 97110 THERAPEUTIC EXERCISES: CPT | Mod: GP,CQ

## 2024-07-12 ASSESSMENT — PAIN - FUNCTIONAL ASSESSMENT: PAIN_FUNCTIONAL_ASSESSMENT: 0-10

## 2024-07-12 ASSESSMENT — PAIN SCALES - GENERAL: PAINLEVEL_OUTOF10: 5 - MODERATE PAIN

## 2024-07-12 ASSESSMENT — PAIN DESCRIPTION - DESCRIPTORS: DESCRIPTORS: ACHING;SQUEEZING;THROBBING

## 2024-07-12 NOTE — PROGRESS NOTES
Physical Therapy Treatment    Patient Name: Katya Sandhu  MRN: [id number]  Today's Date: 7/12/2024  Time Calculation  Start Time: 1130  Stop Time: 1155  Time Calculation (min): 25 min  PT Therapeutic Procedures Time Entry  Therapeutic Exercise Time Entry: 23       Current Problem  1. Aphasia        2. Closed head injury, sequela        3. Neck pain            Subjective   General   Pt stating some reduction in familiar pain since sx, but still endorses R sided cervical pain with radiating numbness into R UE beyond the elbow with pain elbow and triceps.   Insurance   [place]   Visit 4  Approved 2/11  Date range 6/18/24-9/15/24  Precautions  Precautions  Precautions Comment: C6-7 ACDF 4/24/24  Pain  Pain Assessment: 0-10  0-10 (Numeric) Pain Score: 5 - Moderate pain  Pain Location: Neck  Pain Orientation: Right  Pain Descriptors: Aching, Squeezing, Throbbing    Objective   Treatments:  Supine positioning cervical roll w/ 2 pillows B UE unweighted  -unloaded retractions 3x10  -rotation x5 p!  Assessment   Pt with great difficulty tolerating treatment today secondary to pain and radicular symptoms. Able to resolve all familiar symptoms supine positioning and unloaded retractions, but unalbe to progress without onset familiar cervical and/or radicular symptoms. Had a long discussion with pt and  about positioning and symptom management as primary focus in HEP at this point. Pt with gross motor and mobility deficits requiring therapy to address.   Plan:  Address symptoms, progress ROM as tolerated nest visit.     OP EDUCATION:       Goals:

## 2024-07-16 ENCOUNTER — TREATMENT (OUTPATIENT)
Dept: PHYSICAL THERAPY | Facility: CLINIC | Age: 74
End: 2024-07-16
Payer: MEDICARE

## 2024-07-16 DIAGNOSIS — R47.01 APHASIA: Primary | ICD-10-CM

## 2024-07-16 DIAGNOSIS — S09.90XS CLOSED HEAD INJURY, SEQUELA: ICD-10-CM

## 2024-07-16 DIAGNOSIS — M54.2 NECK PAIN: ICD-10-CM

## 2024-07-16 PROCEDURE — 97110 THERAPEUTIC EXERCISES: CPT | Mod: GP,CQ

## 2024-07-16 ASSESSMENT — PAIN SCALES - GENERAL: PAINLEVEL_OUTOF10: 5 - MODERATE PAIN

## 2024-07-16 ASSESSMENT — PAIN - FUNCTIONAL ASSESSMENT: PAIN_FUNCTIONAL_ASSESSMENT: 0-10

## 2024-07-16 NOTE — PROGRESS NOTES
Physical Therapy Treatment    Patient Name: Katya Sandhu  MRN: [id number]  Today's Date: 7/16/2024  Time Calculation  Start Time: 1045  Stop Time: 1125  Time Calculation (min): 40 min  PT Therapeutic Procedures Time Entry  Therapeutic Exercise Time Entry: 38       Current Problem  1. Aphasia        2. Closed head injury, sequela        3. Neck pain            Subjective   General   Pt with marked improvement in pain since previous visit. Still with some localized pain mid thoracic spine. Denies cervicogenic headaches or associated symptoms. Denies radicular symptoms. Most neck and related pain resolved since previous visit.  Insurance   [place]   Visit 4  Approved 2/11  Date range 6/18/24-9/15/24  Precautions  Precautions  Precautions Comment: C6-7 ACDF 4/24/24  Pain  Pain Assessment: 0-10  0-10 (Numeric) Pain Score: 5 - Moderate pain  Pain Location: Back  Pain Orientation: Mid, Upper    Objective   Treatments:  UBE, retro, 5 min  Rows/shoulder extension with scapular retraction, GTB, 10 reps x2  B Shoulder ER/horizontal abduction with scapular retraction, YTB, 10 reps x2  Supine chest press/SA press, 3 lb bar, 10 reps x2  Supine flexion, 2 lb bar, 3 sec hold, 10 reps x2  Supine unloaded retractions, 3 sec hold, 10 reps x2  Wall slides, flexion and scaption, 10 reps x2  SA walkouts, YTB, 10 reps x2  Assessment   Pt tolerated treatment very well today. Able to return to light postural and parascapular strengthening and endurance exercises today. Some tightening of UT limiting tolerance to cervical rotation today towards end of session, but with an overall reduction in pain and stiffness. Pt continues to display reduced tolerance to activity, gross motor weakness, and some painful mobility requiring therapy to address.  Plan:  Progress with ROM and postural strengthening as tolerated.    OP EDUCATION:       Goals:      left normal/right normal

## 2024-07-19 ENCOUNTER — TREATMENT (OUTPATIENT)
Dept: PHYSICAL THERAPY | Facility: CLINIC | Age: 74
End: 2024-07-19
Payer: MEDICARE

## 2024-07-19 ENCOUNTER — APPOINTMENT (OUTPATIENT)
Dept: NEUROSURGERY | Facility: CLINIC | Age: 74
End: 2024-07-19
Payer: MEDICARE

## 2024-07-19 VITALS
SYSTOLIC BLOOD PRESSURE: 132 MMHG | WEIGHT: 109 LBS | DIASTOLIC BLOOD PRESSURE: 80 MMHG | BODY MASS INDEX: 18.16 KG/M2 | HEART RATE: 82 BPM | TEMPERATURE: 97.5 F | HEIGHT: 65 IN

## 2024-07-19 DIAGNOSIS — M54.2 NECK PAIN: Primary | ICD-10-CM

## 2024-07-19 DIAGNOSIS — S09.90XS CLOSED HEAD INJURY, SEQUELA: ICD-10-CM

## 2024-07-19 DIAGNOSIS — Z98.1 STATUS POST CERVICAL SPINAL FUSION: Primary | ICD-10-CM

## 2024-07-19 PROCEDURE — 3079F DIAST BP 80-89 MM HG: CPT | Performed by: STUDENT IN AN ORGANIZED HEALTH CARE EDUCATION/TRAINING PROGRAM

## 2024-07-19 PROCEDURE — 1159F MED LIST DOCD IN RCRD: CPT | Performed by: STUDENT IN AN ORGANIZED HEALTH CARE EDUCATION/TRAINING PROGRAM

## 2024-07-19 PROCEDURE — 97110 THERAPEUTIC EXERCISES: CPT | Mod: GP

## 2024-07-19 PROCEDURE — 3075F SYST BP GE 130 - 139MM HG: CPT | Performed by: STUDENT IN AN ORGANIZED HEALTH CARE EDUCATION/TRAINING PROGRAM

## 2024-07-19 PROCEDURE — 1125F AMNT PAIN NOTED PAIN PRSNT: CPT | Performed by: STUDENT IN AN ORGANIZED HEALTH CARE EDUCATION/TRAINING PROGRAM

## 2024-07-19 PROCEDURE — 99024 POSTOP FOLLOW-UP VISIT: CPT | Performed by: STUDENT IN AN ORGANIZED HEALTH CARE EDUCATION/TRAINING PROGRAM

## 2024-07-19 PROCEDURE — 1036F TOBACCO NON-USER: CPT | Performed by: STUDENT IN AN ORGANIZED HEALTH CARE EDUCATION/TRAINING PROGRAM

## 2024-07-19 RX ORDER — BISMUTH SUBSALICYLATE 262 MG
1 TABLET,CHEWABLE ORAL DAILY
COMMUNITY

## 2024-07-19 RX ORDER — FOLIC ACID 1 MG/1
TABLET ORAL DAILY
COMMUNITY

## 2024-07-19 RX ORDER — SPIRONOLACTONE 50 MG/1
50 TABLET, FILM COATED ORAL
COMMUNITY
Start: 2024-07-11

## 2024-07-19 ASSESSMENT — PAIN SCALES - GENERAL: PAINLEVEL: 2

## 2024-07-19 ASSESSMENT — PATIENT HEALTH QUESTIONNAIRE - PHQ9
1. LITTLE INTEREST OR PLEASURE IN DOING THINGS: NOT AT ALL
2. FEELING DOWN, DEPRESSED OR HOPELESS: NOT AT ALL
SUM OF ALL RESPONSES TO PHQ9 QUESTIONS 1 & 2: 0

## 2024-07-19 NOTE — PROGRESS NOTES
Physical Therapy Treatment    Patient Name: Katya Sandhu  MRN: [id number]  Today's Date: 7/19/2024             Current Problem  No diagnosis found.      Subjective   General   Pt with marked improvement in pain since previous visit. Still with some localized pain mid thoracic spine. Denies cervicogenic headaches or associated symptoms. Denies radicular symptoms. Most neck and related pain resolved since previous visit.  Insurance   [place]   Visit 4  Approved 2/11  Date range 6/18/24-9/15/24  Precautions     Pain       Objective   Treatments:  UBE, retro, 5 min  Rows/shoulder extension with scapular retraction, GTB, 10 reps x2  B Shoulder ER/horizontal abduction with scapular retraction, YTB, 10 reps x2  Supine chest press/SA press, 3 lb bar, 10 reps x2  Supine flexion, 2 lb bar, 3 sec hold, 10 reps x2  Supine unloaded retractions, 3 sec hold, 10 reps x2  Wall slides, flexion and scaption, 10 reps x2  SA walkouts, YTB, 10 reps x2  Assessment   Pt tolerated treatment very well today. Able to return to light postural and parascapular strengthening and endurance exercises today. Some tightening of UT limiting tolerance to cervical rotation today towards end of session, but with an overall reduction in pain and stiffness. Pt continues to display reduced tolerance to activity, gross motor weakness, and some painful mobility requiring therapy to address.  Plan:  Progress with ROM and postural strengthening as tolerated.    OP EDUCATION:       Goals:

## 2024-07-19 NOTE — PROGRESS NOTES
Physical Therapy Treatment    Patient Name: Katya Sandhu  MRN: [id number]  Today's Date: 7/19/2024  Time Calculation  Start Time: 1000  Stop Time: 1046  Time Calculation (min): 46 min  PT Therapeutic Procedures Time Entry  Therapeutic Exercise Time Entry: 45       Current Problem  1. Neck pain        2. Closed head injury, sequela            Subjective   General   Pt reports pain at 4/10 and this pain has been consistent since her last visit.    Insurance   [place]   Visit 5  Approved 2/11  Date range 6/18/24-9/15/24  Precautions     Pain   4/10 today    Objective   Treatments:  UBE F/R: 3'/3'  Seated neck retractions: 10 x  Seated scap retractions: 20 x  B Shoulder ER/horizontal abduction with scapular retraction, YTB, 2 x 10 ea  D2 flexion w/ YTB: 10 x B  Rows/shoulder extension with scapular retraction, GTB, 10 reps x2  Wall slides, flexion and scaption, 15 reps x2  SA walkouts, YTB, 10 reps x2  JOBEs w/ 1#: 10 x ea  Supine chest press/SA press, 3 lb bar, 10 reps x2  Supine unloaded retractions, 3 sec hold, 10 reps x2  Cervical ROM flex/ext, sidebending, rot: 10 x ea    Supine flexion, 2 lb bar, 3 sec hold, 10 reps x2--    Assessment   Pt tolerated session well and is progressing well w/ exercises. Added reps and a few new exercises including D2 flexion and JOBEs to strengthen neck/ shoulder girdle today and patient was able to perform all exercises w/ expected muscle fatigue. Pt required verbal cueing for neck retractions and scap retractions for proper form. We retested ROM at neck following activities today and pt reported she felt no pain. Pt is still limited in neck/shoulder ROM and strength requiring therapy to address.    Plan:  Progress with ROM and postural strengthening as tolerated.    OP EDUCATION:   Educated to include ER w/ YTB and shoulder horizontal abduction w/ YTB to HEP.

## 2024-07-19 NOTE — PROGRESS NOTES
[facility] [place]  Department of Neurological Surgery  Post Operative Patient Visit      History of Present Illness:  Katya Sandhu is a 74 y.o. year old female who presents to the spine clinic in a post operative visit. Since surgery they are 2-3 months s/p C6-7 urgent ACDF reduction of fracture on 4/24/2024. She is doing very well. Her incision is well healed. Minimal neck pain with some elbow discomfort and pain. She is off all pain medications. I will order new x-rays today and call her with the results. She will follow up as needed.      The above clinical summary has been dictated with voice recognition software. It has not been proofread for grammatical errors, typographical mistakes, or other semantic inconsistencies.    Thank you for visiting our office today. It was our pleasure to take part in your healthcare.     Do not hesitate to call with any questions regarding your plan of care after leaving at [phone] M-F 8am-4pm.     To clinicians, thank you very much for this kind referral. It is a privilege to partner with you in the care of your patients. My office would be delighted to assist you with any further consultations or with questions regarding the plan of care outlined. Do not hesitate to call the office or contact me directly.       Sincerely,      Forrest Min MD, [facility]  Spine , [facility]  Guerrero Levine and Kassandra Levine Chair in Spinal Neurosurgery  Neurosurgery , [facility]  Complex Spine Surgery Fellowship Director   of Neurological Surgery  [facility] [employer] [facility] [employer]    [facility]  [address]  [place], [place] [address] [place] [address]  [address] [place] [address]  [address] [place] [address]    Phone: [phone]  Fax: [phone]        Scribe Attestation  By signing my name below, I, Juan Lloyd   attest that this documentation has been prepared under the direction and in the presence of Forrest Min MD.

## 2024-07-20 ENCOUNTER — HOSPITAL ENCOUNTER (OUTPATIENT)
Dept: RADIOLOGY | Facility: HOSPITAL | Age: 74
Discharge: HOME | End: 2024-07-20
Payer: MEDICARE

## 2024-07-20 DIAGNOSIS — Z98.1 STATUS POST CERVICAL SPINAL FUSION: ICD-10-CM

## 2024-07-20 PROCEDURE — 72040 X-RAY EXAM NECK SPINE 2-3 VW: CPT | Performed by: RADIOLOGY

## 2024-07-20 PROCEDURE — 72040 X-RAY EXAM NECK SPINE 2-3 VW: CPT

## 2024-07-22 ENCOUNTER — TREATMENT (OUTPATIENT)
Dept: PHYSICAL THERAPY | Facility: CLINIC | Age: 74
End: 2024-07-22
Payer: MEDICARE

## 2024-07-22 ENCOUNTER — APPOINTMENT (OUTPATIENT)
Dept: PHYSICAL THERAPY | Facility: CLINIC | Age: 74
End: 2024-07-22
Payer: MEDICARE

## 2024-07-22 DIAGNOSIS — M54.2 NECK PAIN: Primary | ICD-10-CM

## 2024-07-22 PROCEDURE — 97110 THERAPEUTIC EXERCISES: CPT | Mod: GP

## 2024-07-22 NOTE — PROGRESS NOTES
PHYSICAL THERAPY TREATMENT NOTE    Patient Name:  Katya Sandhu   Patient MRN: [id number]  Date: 07/22/24  Time Calculation  Start Time: 0900  Stop Time: 0926  Time Calculation (min): 26 min      Problem List Items Addressed This Visit             ICD-10-CM    Neck pain - Primary M54.2       Insurance:  Visit number: 6/11  Insurance Type: Payor: [employer] MEDICARE / Plan: [employer] MED[employer]ARE ADVANTAGE / Product Type: *No Product type* /   Authorization or Plan of Care date Range: Date range 6/18/24-9/15/24     General:  Reason for visit: /P cervical spinal fusion C6-7 ACDF 4/24/24    Referred by: MD Oliver Verma MD appt:  3/19/25     Precautions:  C6-7 ACDF 4/24/24   Fall Risk: High    Assessment:  Patient tolerated treatment session well. Required back support for performance of seated exercises today 2/2 patient reporting back and neck pain in unsupported positions. Tolerated exercises well with only increase in sx with banded rows vs GTB likely due to heavy resistance. Sx reduced with rest. Encouraged patient to continue with performance of previously prescribed HEP to facilitate progression towards established goals.   Education: Answered questions about home exercise program. Provided manual cues for correct performance of exercises. Provided verbal feedback to improve exercise technique.  Progress towards functional goals: Improved lifting ability. Improved reaching ability. Reduced frequency of pain.  Response to interventions: Patient tolerated therapeutic interventions well and without any adverse events. Patient was appropriately fatigued with no complaints. Improved tolerance to strengthening progressions.  Justification for continued skilled care: To address remaining functional, objective and subjective deficits to allow them to return to full independence with ADLs. Assess  "effectiveness of HEP. Modify and progress home exercise program.    Plan:  Progress B UE strength, muscular endurance and mobility of neck per patient tolerance.     Subjective:   Katya reports she feels like her condition is improving.  Progress towards functional goal:  Improved reaching ability. Reduced frequency of pain.   Pain (0-10): 5    HEP adherence / understanding: compliance with the instructed home exercises.    Objective:      Treatment Performed: (\"NP\" = Not Performed)     Therapeutic Exercise:     26 minutes  UBE F/R: 3'/3'  Seated neck retractions: 10 x 3 sec holds  Seated scap retractions: 10 x 5 sec holds  Seated with back support B GH ER with scap retraction x15  Seated with back support B GH horizontal ABD YTB x15  Seated with back support B GH flexion x15 (1#)  Seated with back support B GH ABD x 15 (1#)  Banded rows GTB x7 p!    Not performed today:  B Shoulder ER/horizontal abduction with scapular retraction, YTB, 2 x 10 ea  D2 flexion w/ YTB: 10 x B  Rows/shoulder extension with scapular retraction, GTB, 10 reps x2  Wall slides, flexion and scaption, 15 reps x2  SA walkouts, YTB, 10 reps x2  JOBEs w/ 1#: 10 x ea  Supine chest press/SA press, 3 lb bar, 10 reps x2  Supine unloaded retractions, 3 sec hold, 10 reps x2  Cervical ROM flex/ext, sidebending, rot: 10 x ea    "

## 2024-07-23 NOTE — PROGRESS NOTES
"                                                                                                                         PHYSICAL THERAPY TREATMENT NOTE    Patient Name:  Katya Sandhu   Patient MRN: [id number]  Date: 07/25/24  Time Calculation  Start Time: 0831  Stop Time: 0901  Time Calculation (min): 30 min      Problem List Items Addressed This Visit             ICD-10-CM    Neck pain - Primary M54.2       Insurance:  Visit number: 6/11  Insurance Type: Payor: [employer] / Plan: [facility] MEDICARE ADVANTAGE / Product Type: *No Product type* /   Authorization or Plan of Care date Range: Date range 6/18/24-9/15/24     General:  Reason for visit: /P cervical spinal fusion C6-7 ACDF 4/24/24    Referred by: MD Oliver Verma MD appt:  3/19/25     Precautions:  C6-7 ACDF 4/24/24   Fall Risk: High        Subjective:   Pt reports having 10/10 pain this date and woke up that way.  Pt feels she has made gains overall.  Objective:  Instructed pt to trial cold &/or heat for additional pain relief at home.    Treatment Performed: (\"NP\" = Not Performed)     Therapeutic Exercise:     30 minutes  UBE F/R: 3'/3'  Seated neck retractions: 15 x 5 sec holds*  Seated scap retractions: 15 x 5 sec holds*  Seated with back support B GH ER with scap retraction --NT  Seated with back support B GH horizontal ABD YTB 2x10*  Seated with back support B GH flexion 2x10 (1#)*  Seated with back support B GH ABD x 15 (1#)*  Banded rows GTB x7 --NT  Supine CP 1# 2x10  Supine Neck RR/LR 10x ea (pain free range)  Shoulder shrugs 2x10*    *done in supine today d/t c/o neck pain.    Not performed today:  B Shoulder ER/horizontal abduction with scapular retraction, YTB, 2 x 10 ea  D2 flexion w/ YTB: 10 x B  Rows/shoulder extension with scapular retraction, GTB, 10 reps x2  Wall slides, flexion and scaption, 15 reps x2  SA walkouts, YTB, 10 reps x2  JOBEs w/ 1#: 10 x ea  Supine chest press/SA press, 3 lb bar, 10 reps x2  Supine unloaded " retractions, 3 sec hold, 10 reps x2  Cervical ROM flex/ext, sidebending, rot: 10 x ea      Assessment:  Appears anxious this date at start of session, but not so at end of tx. Patient tolerated treatment session well. Responded well to supine exs vs seated today. Pain exacerbation from unknown cause.  Plan:  Progress B UE strength, muscular endurance and mobility of neck per patient tolerance.

## 2024-07-25 ENCOUNTER — TREATMENT (OUTPATIENT)
Dept: PHYSICAL THERAPY | Facility: CLINIC | Age: 74
End: 2024-07-25
Payer: MEDICARE

## 2024-07-25 DIAGNOSIS — M54.2 NECK PAIN: Primary | ICD-10-CM

## 2024-07-25 PROCEDURE — 97110 THERAPEUTIC EXERCISES: CPT | Mod: GP | Performed by: PHYSICAL THERAPIST

## 2024-07-25 ASSESSMENT — PAIN SCALES - GENERAL: PAINLEVEL_OUTOF10: 10 - WORST POSSIBLE PAIN

## 2024-07-25 ASSESSMENT — PAIN DESCRIPTION - DESCRIPTORS: DESCRIPTORS: THROBBING

## 2024-07-25 ASSESSMENT — PAIN - FUNCTIONAL ASSESSMENT: PAIN_FUNCTIONAL_ASSESSMENT: 0-10

## 2024-07-29 ENCOUNTER — TREATMENT (OUTPATIENT)
Dept: PHYSICAL THERAPY | Facility: CLINIC | Age: 74
End: 2024-07-29
Payer: MEDICARE

## 2024-07-29 DIAGNOSIS — S09.90XS CLOSED HEAD INJURY, SEQUELA: ICD-10-CM

## 2024-07-29 DIAGNOSIS — R47.01 APHASIA: ICD-10-CM

## 2024-07-29 DIAGNOSIS — M54.2 NECK PAIN: Primary | ICD-10-CM

## 2024-07-29 PROCEDURE — 97110 THERAPEUTIC EXERCISES: CPT | Mod: GP,CQ

## 2024-07-29 ASSESSMENT — PAIN - FUNCTIONAL ASSESSMENT: PAIN_FUNCTIONAL_ASSESSMENT: 0-10

## 2024-07-29 ASSESSMENT — PAIN SCALES - GENERAL: PAINLEVEL_OUTOF10: 5 - MODERATE PAIN

## 2024-07-29 NOTE — PROGRESS NOTES
Physical Therapy Treatment    Patient Name: Katya Sandhu  MRN: [id number]  Today's Date: 7/29/2024  Time Calculation  Start Time: 0833  Stop Time: 0912  Time Calculation (min): 39 min  PT Therapeutic Procedures Time Entry  Therapeutic Exercise Time Entry: 38       Current Problem  1. Neck pain        2. Closed head injury, sequela        3. Aphasia            Subjective   General   Pt stating some persistent cervical and upper thoracic pain, but greatly reduced compared to previous visits.  Insurance   [place]   Visit 8  Approved 8/11  Date range 6/18/24-9/15/24  Precautions  Precautions  Precautions Comment: C6-7 ACDF 4/24/24  Pain  Pain Assessment: 0-10  0-10 (Numeric) Pain Score: 5 - Moderate pain  Pain Location: Neck  Pain Orientation: Mid, Upper    Objective   Treatments:  UBE, retro, L1, 5 minutes    Rows/Shoulder extensions with scapular retraction, GTB, 30 reps  B horizontal abduction/ER, YTB, 30 reps  Jobes's, 1 lb, 20 reps    Supine wand: chest press and SA press, 4 lb bar, 30 reps  Supine wand: flexion, 2 lb bar, 30 reps    Medball series, 20 reps  Wall slides: flexion and scaption, 20 reps  Assessment   Pt tolerated treatment well today. Able to return to posterior chain strengthening without increase in pain. Denies pain at all today. Quick fatigue onset. Most limited secondary to generalized weakness and endurance deficits. Showing good improvement functional mobility and tolerance to activity, but still with fair amount of muscle and systemic weakness and fatigue deficits requiring therapy to address.  Plan:  Progress with POC as tolerated.    OP EDUCATION:       Goals:

## 2024-08-08 NOTE — PROGRESS NOTES
Physical Therapy Treatment    Patient Name: Katya Sandhu  MRN: [id number]  Today's Date: 8/9/2024  Time Calculation  Start Time: 0919  Stop Time: 0959  Time Calculation (min): 40 min  PT Therapeutic Procedures Time Entry  Therapeutic Exercise Time Entry: 40       Current Problem  1. Neck pain              Subjective   General   Pt stating some persistent cervical and upper thoracic pain, but greatly reduced compared to previous visits.  Insurance   [place]   Visit 10  Approved 10/11  Date range 6/18/24-9/15/24  Precautions  Precautions  Precautions Comment: C6-7 ACDF 4/24/24  Pain  Pain Assessment: 0-10  0-10 (Numeric) Pain Score: 6  Pain Location: Neck  Pain Orientation: Upper    Objective   Treatments:  UBE,  5 minutes F  Susana retracts 10x10s  Shrugs 10x10s  Chin tucks 10x10s  Supine  Wand FF/CP 20x ea  Supine Chin Tuck 20x  Supine HAB YTB 2x10 BL  Supine Susana FF YTB 2x10 BL  Supine unilateral reaches YTB 2x10 BL  Cervical SB stretch 5x15s BL    NT  Rows/Shoulder extensions with scapular retraction, GTB, 30 reps  B horizontal abduction/ER, YTB, 30 reps  Jobes's, 1 lb, 20 reps  Supine wand: chest press and SA press, 4 lb bar, 30 reps  Supine wand: flexion, 2 lb bar, 30 reps  Medball series, 20 reps  Wall slides: flexion and scaption, 20 reps    Assessment:  Pt able to tolerate tx session well this date w/ no adverse effects noted from tx.  Pt compliant w/ program and appears to understand rationale for therapy.  Still requires skilled therapy for increasing strength/ROM for performing ADLs.      Plan:  D/t noted impairments and dysfunction of  neck, PT will cont to address deficits of strength and ROM via therapeutic exs and modalities PRN and further assist w/ pain reduction.      OP EDUCATION:       Goals:

## 2024-08-09 ENCOUNTER — TREATMENT (OUTPATIENT)
Dept: PHYSICAL THERAPY | Facility: CLINIC | Age: 74
End: 2024-08-09
Payer: MEDICARE

## 2024-08-09 DIAGNOSIS — M54.2 NECK PAIN: Primary | ICD-10-CM

## 2024-08-09 PROCEDURE — 97110 THERAPEUTIC EXERCISES: CPT | Mod: GP | Performed by: PHYSICAL THERAPIST

## 2024-08-09 ASSESSMENT — PAIN SCALES - GENERAL: PAINLEVEL_OUTOF10: 6

## 2024-08-09 ASSESSMENT — PAIN - FUNCTIONAL ASSESSMENT: PAIN_FUNCTIONAL_ASSESSMENT: 0-10

## 2024-08-12 NOTE — PROGRESS NOTES
Physical Therapy    Discharge Summary      Discharge Summary: PT    Discharge Information: Date of discharge 8/14/24    Therapy Summary: Progressed    Discharge Status: Stable     Rehab Discharge Reason: Achieved all and/or the most significant goals(s)      Patient Name: Katya Sandhu  MRN: [id number]  Today's Date: 8/14/2024  Time Calculation  Start Time: 1050  Stop Time: 1119  Time Calculation (min): 29 min  PT Therapeutic Procedures Time Entry  Therapeutic Exercise Time Entry: 29       Current Problem  1. Neck pain                Subjective   General   Pt stating some persistent cervical and upper thoracic pain, but greatly reduced compared to previous visits.  Insurance   [place]   Visit 11  Approved 11/11  Date range 6/18/24-9/15/24  Precautions     Pain  Pain Assessment: 0-10  0-10 (Numeric) Pain Score: 4  Pain Location: Neck  Pain Orientation: Upper    Objective   Pt arrives late for appt-abbreviated tx session today    Other Measures  Neck Disability Index: 7    Treatments:  UBE,  5 minutes F  Susana retracts 10x10s  Shrugs 10x10s  Chin tucks 10x10s  Seated Susana FF 20x      NT---  Supine  Wand FF/CP 20x ea  Supine Chin Tuck 20x  Supine HAB YTB 2x10 BL  Supine Susana FF YTB 2x10 BL  Supine unilateral reaches YTB 2x10 BL  Cervical SB stretch 5x15s BL    NT  Rows/Shoulder extensions with scapular retraction, GTB, 30 reps  B horizontal abduction/ER, YTB, 30 reps  Jobes's, 1 lb, 20 reps  Supine wand: chest press and SA press, 4 lb bar, 30 reps  Supine wand: flexion, 2 lb bar, 30 reps  Medball series, 20 reps  Wall slides: flexion and scaption, 20 reps    Assessment:  Pt ready for DC from PT, has achieved most goals.     Plan:  DC PT services, pt to follow up w/ MD PRN.      OP EDUCATION:       Goals:  1.) Independent with HEP to expedite progress and promote goal achievement.-MET  2.) Reduce worst reported pain within last 48 hours to < 3/10 in order to allow patient to perform daily tasks without  limitation/with decreased discomfort.-NM  3.) Improve Cervical AROM to >/= WFL in order to allow patient to look in all directions without need for compensatory strategies. _MET  4.) Improve R UE strength to >/= 4+/5 in order to allow patient to lift objects without needing to compensate with L UE.-MET  5.) Report decrease in NDI by greater than or equal to 5 points to meet the MCID (IE --)-MET

## 2024-08-14 ENCOUNTER — TREATMENT (OUTPATIENT)
Dept: PHYSICAL THERAPY | Facility: CLINIC | Age: 74
End: 2024-08-14
Payer: MEDICARE

## 2024-08-14 DIAGNOSIS — M54.2 NECK PAIN: Primary | ICD-10-CM

## 2024-08-14 PROCEDURE — 97110 THERAPEUTIC EXERCISES: CPT | Mod: GP | Performed by: PHYSICAL THERAPIST

## 2024-08-14 ASSESSMENT — PAIN - FUNCTIONAL ASSESSMENT: PAIN_FUNCTIONAL_ASSESSMENT: 0-10

## 2024-08-14 ASSESSMENT — PAIN SCALES - GENERAL: PAINLEVEL_OUTOF10: 4

## 2025-03-14 DIAGNOSIS — G40.909 SEIZURE DISORDER (MULTI): ICD-10-CM

## 2025-03-14 RX ORDER — ZONISAMIDE 100 MG/1
200 CAPSULE ORAL DAILY
Qty: 180 CAPSULE | Refills: 3 | Status: SHIPPED | OUTPATIENT
Start: 2025-03-14 | End: 2026-03-14

## 2025-03-19 ENCOUNTER — APPOINTMENT (OUTPATIENT)
Dept: NEUROLOGY | Facility: CLINIC | Age: 75
End: 2025-03-19
Payer: MEDICARE

## 2025-03-26 ENCOUNTER — APPOINTMENT (OUTPATIENT)
Dept: NEUROLOGY | Facility: CLINIC | Age: 75
End: 2025-03-26
Payer: MEDICARE

## 2025-03-26 VITALS
DIASTOLIC BLOOD PRESSURE: 84 MMHG | SYSTOLIC BLOOD PRESSURE: 140 MMHG | BODY MASS INDEX: 18.08 KG/M2 | TEMPERATURE: 96.3 F | WEIGHT: 108.5 LBS | HEIGHT: 65 IN

## 2025-03-26 DIAGNOSIS — F10.939: ICD-10-CM

## 2025-03-26 DIAGNOSIS — R56.9: ICD-10-CM

## 2025-03-26 DIAGNOSIS — G93.9 BRAIN LESION: ICD-10-CM

## 2025-03-26 DIAGNOSIS — G40.909 SEIZURE DISORDER (MULTI): ICD-10-CM

## 2025-03-26 DIAGNOSIS — F10.27 MODERATE DEMENTIA ASSOCIATED WITH ALCOHOLISM, WITHOUT BEHAVIORAL DISTURBANCE, PSYCHOTIC DISTURBANCE, MOOD DISTURBANCE, OR ANXIETY: Primary | ICD-10-CM

## 2025-03-26 PROBLEM — R47.01 APHASIA: Status: RESOLVED | Noted: 2022-09-06 | Resolved: 2025-03-26

## 2025-03-26 PROCEDURE — 1160F RVW MEDS BY RX/DR IN RCRD: CPT | Performed by: STUDENT IN AN ORGANIZED HEALTH CARE EDUCATION/TRAINING PROGRAM

## 2025-03-26 PROCEDURE — 1159F MED LIST DOCD IN RCRD: CPT | Performed by: STUDENT IN AN ORGANIZED HEALTH CARE EDUCATION/TRAINING PROGRAM

## 2025-03-26 PROCEDURE — 1036F TOBACCO NON-USER: CPT | Performed by: STUDENT IN AN ORGANIZED HEALTH CARE EDUCATION/TRAINING PROGRAM

## 2025-03-26 PROCEDURE — 3077F SYST BP >= 140 MM HG: CPT | Performed by: STUDENT IN AN ORGANIZED HEALTH CARE EDUCATION/TRAINING PROGRAM

## 2025-03-26 PROCEDURE — 99214 OFFICE O/P EST MOD 30 MIN: CPT | Performed by: STUDENT IN AN ORGANIZED HEALTH CARE EDUCATION/TRAINING PROGRAM

## 2025-03-26 PROCEDURE — 3079F DIAST BP 80-89 MM HG: CPT | Performed by: STUDENT IN AN ORGANIZED HEALTH CARE EDUCATION/TRAINING PROGRAM

## 2025-03-26 ASSESSMENT — PATIENT HEALTH QUESTIONNAIRE - PHQ9
SUM OF ALL RESPONSES TO PHQ9 QUESTIONS 1 & 2: 2
1. LITTLE INTEREST OR PLEASURE IN DOING THINGS: SEVERAL DAYS
2. FEELING DOWN, DEPRESSED OR HOPELESS: SEVERAL DAYS

## 2025-03-26 ASSESSMENT — LIFESTYLE VARIABLES
HOW MANY STANDARD DRINKS CONTAINING ALCOHOL DO YOU HAVE ON A TYPICAL DAY: 1 OR 2
AUDIT-C TOTAL SCORE: 3
HOW OFTEN DO YOU HAVE SIX OR MORE DRINKS ON ONE OCCASION: NEVER
SKIP TO QUESTIONS 9-10: 1
HOW OFTEN DO YOU HAVE A DRINK CONTAINING ALCOHOL: 2-3 TIMES A WEEK

## 2025-03-26 NOTE — PROGRESS NOTES
[facility]   Referring: No ref. provider found  PCP: Josselin Abdi MD  Chief Complaint   Patient presents with    Seizures     Pt states she had one seizure 4 years ago she states she has no concerns today and has had no side effects to her medication.        Subjective   Katya Sandhu is a 75 y.o. year old female presenting for visit for follow-up .     She was last seen 3/21/2024.     She was hospitalized for encephalopathy and alcohol withdrawal 4/2024.     She was admitted 4/24/2022 with aphasia and R hemiparesis. MRI brain showed a left temporal lobe. She had focal status epilepticus. Initially treated with Keppra 500mg BID. She had trouble tolerating and was switched to zonisamide 300mg nightly and this was reduced to 200mg nightly. She has had no further seizures. Repeat MRI brain done 2/12/2024 showed stable left hippocampus and left amygdala non enhancing [id number] lesions consistent with mesial temporal sclerosis.     She has had no further seizures. She continues to have alcohol use disorder with binge drinking and related cognitive impairment. She also has a history of migraine headaches but these have improved.     She is sleeps well at night but does not take naps during the day which she was doing previously.     Patient Active Problem List   Diagnosis    ETOH abuse    Depression    HTN (hypertension)    Hypothyroidism    Localized, primary osteoarthritis    Migraine headache    Myocardial infarction, inferolateral wall (Multi)    Brain lesion    Seizure disorder (Multi)    Traumatic rhabdomyolysis (CMS-HCC)    Fall    Closed head injury    Elevated lactic acid level    Neck pain    Closed nondisplaced fracture of sixth cervical vertebra    Moderate dementia associated with alcoholism, without behavioral disturbance, psychotic disturbance, mood disturbance, or anxiety (Multi)       Objective   Neurological Exam  Mental Status  Awake, alert and oriented to person,  place and time. Speech is normal.    Cranial Nerves  CN III, IV, VI: Extraocular movements intact bilaterally.  CN VII: Full and symmetric facial movement.  CN VIII: Hearing is normal.    Motor   Strength is 5/5 throughout all four extremities.    Gait  Casual gait is normal including stance, stride, and arm swing.    Physical Exam  Eyes:      Extraocular Movements: Extraocular movements intact.   Neurological:      Motor: Motor strength is normal.  Psychiatric:         Speech: Speech normal.       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 Repeat MRI brain stable. 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 Continue zonisamide 200mg nightly. Encouraged good nutrition to avoid weight loss which is also a side effect of zonisamide.  Alcohol use disorder: not ok to drive while continuing to abuse alcohol in binges   Migraine headaches: resolved.     Follow-up in 1 year     There are no Patient Instructions on file for this visit.

## 2026-03-24 ENCOUNTER — APPOINTMENT (OUTPATIENT)
Dept: NEUROLOGY | Facility: CLINIC | Age: 76
End: 2026-03-24
Payer: MEDICARE

## (undated) DEVICE — GLOVE, SURGICAL, PROTEXIS PI MICRO, 8.5, PF, LF

## (undated) DEVICE — DRAPE, SMARTDRAPE, FOR TIVATO MICROSCOPE

## (undated) DEVICE — DRAPE, MICROSCOPE/W GLASS LENS, LEICA F40

## (undated) DEVICE — DRAPE, LAPAROTOMY II, PEDIATRIC

## (undated) DEVICE — DRAPE, MICROSCOPE, W/REMOVABLE LENS

## (undated) DEVICE — Device

## (undated) DEVICE — DISTRACTION PIN, 14MM, STERILE

## (undated) DEVICE — SUTURE, SILK, 2-0, 18 IN, FSL, BLACK

## (undated) DEVICE — WOUND SYSTEM, DEBRIDEMENT & CLEANING, O.R DUOPAK

## (undated) DEVICE — DRAPE, PAD, INSTRUMENT, MAGNETIC, MEDIUM, 10 X 16 IN, DISPOSABLE

## (undated) DEVICE — TOWEL PACK, STERILE, 4/PACK, BLUE

## (undated) DEVICE — SEALANT, HEMOSTATIC, FLOSEAL, 10 ML

## (undated) DEVICE — SUTURE, VICRYL, 3-0,18 IN, SH, UNDYED

## (undated) DEVICE — TISSUE ADHESIVE, PREMIERPRO EXOFIN, PRECISION PEN HV, 1.0ML

## (undated) DEVICE — ELECTRODE, ELECTROSURGICAL, BLADE EXT 4 INCH, INSULATED

## (undated) DEVICE — TOOL, MR8, 12CM MATCH HEAD, 3MM DIA

## (undated) DEVICE — RESERVOIR, WOUND, W/TROCAR, PVC, MEDIUM, 400CC, DAVOL, 1/8 IN, 10FR